# Patient Record
Sex: MALE | Race: WHITE | NOT HISPANIC OR LATINO | Employment: OTHER | URBAN - METROPOLITAN AREA
[De-identification: names, ages, dates, MRNs, and addresses within clinical notes are randomized per-mention and may not be internally consistent; named-entity substitution may affect disease eponyms.]

---

## 2020-11-25 ENCOUNTER — AMBULATORY - HEALTHEAST (OUTPATIENT)
Dept: SURGERY | Facility: CLINIC | Age: 85
End: 2020-11-25

## 2020-11-25 DIAGNOSIS — Z11.59 ENCOUNTER FOR SCREENING FOR OTHER VIRAL DISEASES: ICD-10-CM

## 2020-11-27 ENCOUNTER — RECORDS - HEALTHEAST (OUTPATIENT)
Dept: ADMINISTRATIVE | Facility: OTHER | Age: 85
End: 2020-11-27

## 2021-05-28 ENCOUNTER — RECORDS - HEALTHEAST (OUTPATIENT)
Dept: ADMINISTRATIVE | Facility: CLINIC | Age: 86
End: 2021-05-28

## 2022-07-08 ENCOUNTER — APPOINTMENT (OUTPATIENT)
Dept: CT IMAGING | Facility: CLINIC | Age: 87
End: 2022-07-08
Attending: EMERGENCY MEDICINE
Payer: COMMERCIAL

## 2022-07-08 ENCOUNTER — APPOINTMENT (OUTPATIENT)
Dept: RADIOLOGY | Facility: CLINIC | Age: 87
End: 2022-07-08
Attending: EMERGENCY MEDICINE
Payer: COMMERCIAL

## 2022-07-08 ENCOUNTER — MEDICAL CORRESPONDENCE (OUTPATIENT)
Dept: EMERGENCY MEDICINE | Facility: CLINIC | Age: 87
End: 2022-07-08

## 2022-07-08 ENCOUNTER — HOSPITAL ENCOUNTER (OUTPATIENT)
Facility: CLINIC | Age: 87
Setting detail: OBSERVATION
Discharge: NURSING, REHAB OR LONG-TERM CARE | End: 2022-07-11
Attending: EMERGENCY MEDICINE | Admitting: EMERGENCY MEDICINE
Payer: COMMERCIAL

## 2022-07-08 DIAGNOSIS — M62.81 GENERALIZED MUSCLE WEAKNESS: ICD-10-CM

## 2022-07-08 DIAGNOSIS — I48.91 ATRIAL FIBRILLATION WITH RVR (H): ICD-10-CM

## 2022-07-08 DIAGNOSIS — N39.0 URINARY TRACT INFECTION ASSOCIATED WITH INDWELLING URETHRAL CATHETER, INITIAL ENCOUNTER (H): Primary | ICD-10-CM
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
## 2022-07-08 DIAGNOSIS — T83.511A URINARY TRACT INFECTION ASSOCIATED WITH INDWELLING URETHRAL CATHETER, INITIAL ENCOUNTER (H): Primary | ICD-10-CM

## 2022-07-08 LAB
ABO/RH(D): NORMAL
ALBUMIN SERPL-MCNC: 3.4 G/DL (ref 3.5–5)
ALP SERPL-CCNC: 47 U/L (ref 45–120)
ALT SERPL W P-5'-P-CCNC: 28 U/L (ref 0–45)
ANION GAP SERPL CALCULATED.3IONS-SCNC: 12 MMOL/L (ref 5–18)
ANTIBODY SCREEN: NEGATIVE
AST SERPL W P-5'-P-CCNC: 20 U/L (ref 0–40)
BILIRUB DIRECT SERPL-MCNC: 0.4 MG/DL
BILIRUB SERPL-MCNC: 1.4 MG/DL (ref 0–1)
BUN SERPL-MCNC: 26 MG/DL (ref 8–28)
CALCIUM SERPL-MCNC: 8.8 MG/DL (ref 8.5–10.5)
CHLORIDE BLD-SCNC: 107 MMOL/L (ref 98–107)
CO2 SERPL-SCNC: 20 MMOL/L (ref 22–31)
CREAT SERPL-MCNC: 0.95 MG/DL (ref 0.7–1.3)
ERYTHROCYTE [DISTWIDTH] IN BLOOD BY AUTOMATED COUNT: 13.2 % (ref 10–15)
GFR SERPL CREATININE-BSD FRML MDRD: 77 ML/MIN/1.73M2
GLUCOSE BLD-MCNC: 143 MG/DL (ref 70–125)
HCT VFR BLD AUTO: 50.7 % (ref 40–53)
HGB BLD-MCNC: 17.1 G/DL (ref 13.3–17.7)
INR PPP: 3.66 (ref 0.85–1.15)
MAGNESIUM SERPL-MCNC: 2.1 MG/DL (ref 1.8–2.6)
MCH RBC QN AUTO: 31.9 PG (ref 26.5–33)
MCHC RBC AUTO-ENTMCNC: 33.7 G/DL (ref 31.5–36.5)
MCV RBC AUTO: 95 FL (ref 78–100)
PLATELET # BLD AUTO: 239 10E3/UL (ref 150–450)
POTASSIUM BLD-SCNC: 4.2 MMOL/L (ref 3.5–5)
PROT SERPL-MCNC: 6.4 G/DL (ref 6–8)
RBC # BLD AUTO: 5.36 10E6/UL (ref 4.4–5.9)
SARS-COV-2 RNA RESP QL NAA+PROBE: NEGATIVE
SODIUM SERPL-SCNC: 139 MMOL/L (ref 136–145)
SPECIMEN EXPIRATION DATE: NORMAL
TROPONIN I SERPL-MCNC: 0.08 NG/ML (ref 0–0.29)
WBC # BLD AUTO: 16.8 10E3/UL (ref 4–11)

## 2022-07-08 PROCEDURE — 36415 COLL VENOUS BLD VENIPUNCTURE: CPT | Performed by: EMERGENCY MEDICINE

## 2022-07-08 PROCEDURE — 93005 ELECTROCARDIOGRAM TRACING: CPT | Performed by: EMERGENCY MEDICINE

## 2022-07-08 PROCEDURE — 83735 ASSAY OF MAGNESIUM: CPT | Performed by: EMERGENCY MEDICINE

## 2022-07-08 PROCEDURE — 85610 PROTHROMBIN TIME: CPT | Performed by: EMERGENCY MEDICINE

## 2022-07-08 PROCEDURE — 71045 X-RAY EXAM CHEST 1 VIEW: CPT

## 2022-07-08 PROCEDURE — 72125 CT NECK SPINE W/O DYE: CPT

## 2022-07-08 PROCEDURE — 82248 BILIRUBIN DIRECT: CPT | Performed by: EMERGENCY MEDICINE

## 2022-07-08 PROCEDURE — C9803 HOPD COVID-19 SPEC COLLECT: HCPCS

## 2022-07-08 PROCEDURE — 74177 CT ABD & PELVIS W/CONTRAST: CPT

## 2022-07-08 PROCEDURE — 99285 EMERGENCY DEPT VISIT HI MDM: CPT | Mod: 25

## 2022-07-08 PROCEDURE — 85014 HEMATOCRIT: CPT | Performed by: EMERGENCY MEDICINE

## 2022-07-08 PROCEDURE — 86850 RBC ANTIBODY SCREEN: CPT | Performed by: EMERGENCY MEDICINE

## 2022-07-08 PROCEDURE — 86901 BLOOD TYPING SEROLOGIC RH(D): CPT | Performed by: EMERGENCY MEDICINE

## 2022-07-08 PROCEDURE — U0003 INFECTIOUS AGENT DETECTION BY NUCLEIC ACID (DNA OR RNA); SEVERE ACUTE RESPIRATORY SYNDROME CORONAVIRUS 2 (SARS-COV-2) (CORONAVIRUS DISEASE [COVID-19]), AMPLIFIED PROBE TECHNIQUE, MAKING USE OF HIGH THROUGHPUT TECHNOLOGIES AS DESCRIBED BY CMS-2020-01-R: HCPCS | Performed by: EMERGENCY MEDICINE

## 2022-07-08 PROCEDURE — 84484 ASSAY OF TROPONIN QUANT: CPT | Performed by: EMERGENCY MEDICINE

## 2022-07-08 PROCEDURE — 70450 CT HEAD/BRAIN W/O DYE: CPT

## 2022-07-08 PROCEDURE — 250N000011 HC RX IP 250 OP 636: Performed by: EMERGENCY MEDICINE

## 2022-07-08 PROCEDURE — 80053 COMPREHEN METABOLIC PANEL: CPT | Performed by: EMERGENCY MEDICINE

## 2022-07-08 RX ORDER — IOPAMIDOL 755 MG/ML
100 INJECTION, SOLUTION INTRAVASCULAR ONCE
Status: COMPLETED | OUTPATIENT
Start: 2022-07-08 | End: 2022-07-08

## 2022-07-08 RX ADMIN — IOPAMIDOL 100 ML: 755 INJECTION, SOLUTION INTRAVENOUS at 23:27

## 2022-07-08 ASSESSMENT — ENCOUNTER SYMPTOMS
LIGHT-HEADEDNESS: 0
TROUBLE SWALLOWING: 0
BACK PAIN: 0
DIARRHEA: 1
FREQUENCY: 0
DIZZINESS: 0
CONFUSION: 0
DIFFICULTY URINATING: 0
SHORTNESS OF BREATH: 0
ABDOMINAL PAIN: 1
DYSURIA: 0
FEVER: 0
NECK PAIN: 0
HEADACHES: 0
COUGH: 0
VOMITING: 1
BRUISES/BLEEDS EASILY: 1
HEMATURIA: 0

## 2022-07-09 ENCOUNTER — APPOINTMENT (OUTPATIENT)
Dept: PHYSICAL THERAPY | Facility: CLINIC | Age: 87
End: 2022-07-09
Attending: HOSPITALIST
Payer: COMMERCIAL

## 2022-07-09 ENCOUNTER — APPOINTMENT (OUTPATIENT)
Dept: OCCUPATIONAL THERAPY | Facility: CLINIC | Age: 87
End: 2022-07-09
Attending: HOSPITALIST
Payer: COMMERCIAL
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
## 2022-07-09 PROBLEM — M62.81 GENERALIZED MUSCLE WEAKNESS: Status: ACTIVE | Noted: 2022-07-09

## 2022-07-09 PROBLEM — I48.91 ATRIAL FIBRILLATION WITH RVR (H): Status: ACTIVE | Noted: 2022-07-09

## 2022-07-09 LAB
ALBUMIN UR-MCNC: 20 MG/DL
APPEARANCE UR: CLEAR
ATRIAL RATE - MUSE: 120 BPM
BILIRUB UR QL STRIP: NEGATIVE
COLOR UR AUTO: ABNORMAL
DIASTOLIC BLOOD PRESSURE - MUSE: NORMAL MMHG
GLUCOSE UR STRIP-MCNC: NEGATIVE MG/DL
HGB UR QL STRIP: NEGATIVE
INTERPRETATION ECG - MUSE: NORMAL
KETONES UR STRIP-MCNC: NEGATIVE MG/DL
LEUKOCYTE ESTERASE UR QL STRIP: ABNORMAL
MUCOUS THREADS #/AREA URNS LPF: PRESENT /LPF
NITRATE UR QL: NEGATIVE
P AXIS - MUSE: NORMAL DEGREES
PH UR STRIP: 5.5 [PH] (ref 5–7)
PR INTERVAL - MUSE: NORMAL MS
QRS DURATION - MUSE: 82 MS
QT - MUSE: 308 MS
QTC - MUSE: 459 MS
R AXIS - MUSE: 77 DEGREES
RBC URINE: 9 /HPF
SP GR UR STRIP: >1.05 (ref 1–1.03)
SQUAMOUS EPITHELIAL: 2 /HPF
SYSTOLIC BLOOD PRESSURE - MUSE: NORMAL MMHG
T AXIS - MUSE: -9 DEGREES
UROBILINOGEN UR STRIP-MCNC: <2 MG/DL
VENTRICULAR RATE- MUSE: 134 BPM
WBC URINE: 13 /HPF

## 2022-07-09 PROCEDURE — 250N000011 HC RX IP 250 OP 636: Performed by: INTERNAL MEDICINE

## 2022-07-09 PROCEDURE — 258N000003 HC RX IP 258 OP 636: Performed by: HOSPITALIST

## 2022-07-09 PROCEDURE — G0378 HOSPITAL OBSERVATION PER HR: HCPCS

## 2022-07-09 PROCEDURE — 97535 SELF CARE MNGMENT TRAINING: CPT | Mod: GO

## 2022-07-09 PROCEDURE — 97166 OT EVAL MOD COMPLEX 45 MIN: CPT | Mod: GO

## 2022-07-09 PROCEDURE — 81001 URINALYSIS AUTO W/SCOPE: CPT | Performed by: EMERGENCY MEDICINE

## 2022-07-09 PROCEDURE — 96365 THER/PROPH/DIAG IV INF INIT: CPT

## 2022-07-09 PROCEDURE — 250N000013 HC RX MED GY IP 250 OP 250 PS 637: Performed by: INTERNAL MEDICINE

## 2022-07-09 PROCEDURE — 97162 PT EVAL MOD COMPLEX 30 MIN: CPT | Mod: GP

## 2022-07-09 PROCEDURE — 99220 PR INITIAL OBSERVATION CARE,LEVEL III: CPT | Performed by: HOSPITALIST

## 2022-07-09 PROCEDURE — 87040 BLOOD CULTURE FOR BACTERIA: CPT | Performed by: INTERNAL MEDICINE

## 2022-07-09 PROCEDURE — 36415 COLL VENOUS BLD VENIPUNCTURE: CPT | Performed by: INTERNAL MEDICINE

## 2022-07-09 PROCEDURE — 87086 URINE CULTURE/COLONY COUNT: CPT | Performed by: EMERGENCY MEDICINE

## 2022-07-09 PROCEDURE — 97116 GAIT TRAINING THERAPY: CPT | Mod: GP

## 2022-07-09 PROCEDURE — 250N000013 HC RX MED GY IP 250 OP 250 PS 637: Performed by: HOSPITALIST

## 2022-07-09 RX ORDER — PROCHLORPERAZINE MALEATE 5 MG
5 TABLET ORAL EVERY 6 HOURS PRN
Status: DISCONTINUED | OUTPATIENT
Start: 2022-07-09 | End: 2022-07-11 | Stop reason: HOSPADM

## 2022-07-09 RX ORDER — WARFARIN SODIUM 5 MG/1
2.5-5 TABLET ORAL SEE ADMIN INSTRUCTIONS
Status: ON HOLD | COMMUNITY
Start: 2022-05-26 | End: 2022-07-11

## 2022-07-09 RX ORDER — CEFTRIAXONE 1 G/1
1 INJECTION, POWDER, FOR SOLUTION INTRAMUSCULAR; INTRAVENOUS EVERY 24 HOURS
Status: DISCONTINUED | OUTPATIENT
Start: 2022-07-09 | End: 2022-07-11 | Stop reason: HOSPADM

## 2022-07-09 RX ORDER — ACETAMINOPHEN 325 MG/1
975 TABLET ORAL EVERY 8 HOURS PRN
Status: DISCONTINUED | OUTPATIENT
Start: 2022-07-09 | End: 2022-07-11 | Stop reason: HOSPADM

## 2022-07-09 RX ORDER — METOPROLOL TARTRATE 50 MG
50 TABLET ORAL 2 TIMES DAILY
Status: ON HOLD | COMMUNITY
Start: 2022-04-22 | End: 2022-07-11

## 2022-07-09 RX ORDER — SODIUM CHLORIDE 9 MG/ML
INJECTION, SOLUTION INTRAVENOUS CONTINUOUS
Status: DISCONTINUED | OUTPATIENT
Start: 2022-07-09 | End: 2022-07-11

## 2022-07-09 RX ORDER — TRAVOPROST OPHTHALMIC SOLUTION 0.04 MG/ML
1 SOLUTION OPHTHALMIC AT BEDTIME
COMMUNITY
Start: 2022-06-28 | End: 2024-01-01

## 2022-07-09 RX ORDER — DILTIAZEM HYDROCHLORIDE 30 MG/1
30 TABLET, FILM COATED ORAL EVERY 4 HOURS PRN
Status: DISCONTINUED | OUTPATIENT
Start: 2022-07-09 | End: 2022-07-11 | Stop reason: HOSPADM

## 2022-07-09 RX ORDER — MELOXICAM 7.5 MG/1
7.5 TABLET ORAL DAILY
COMMUNITY
Start: 2022-06-09 | End: 2022-09-20

## 2022-07-09 RX ORDER — DILTIAZEM HYDROCHLORIDE 120 MG/1
120 CAPSULE, COATED, EXTENDED RELEASE ORAL DAILY
COMMUNITY
Start: 2022-05-24 | End: 2024-01-01

## 2022-07-09 RX ORDER — TAMSULOSIN HYDROCHLORIDE 0.4 MG/1
0.4 CAPSULE ORAL DAILY
Status: DISCONTINUED | OUTPATIENT
Start: 2022-07-09 | End: 2022-07-11 | Stop reason: HOSPADM

## 2022-07-09 RX ORDER — DOCUSATE SODIUM 100 MG/1
100 CAPSULE, LIQUID FILLED ORAL 2 TIMES DAILY
Status: DISCONTINUED | OUTPATIENT
Start: 2022-07-09 | End: 2022-07-11 | Stop reason: HOSPADM

## 2022-07-09 RX ORDER — ONDANSETRON 4 MG/1
4 TABLET, ORALLY DISINTEGRATING ORAL EVERY 6 HOURS PRN
Status: DISCONTINUED | OUTPATIENT
Start: 2022-07-09 | End: 2022-07-11 | Stop reason: HOSPADM

## 2022-07-09 RX ORDER — ONDANSETRON 2 MG/ML
4 INJECTION INTRAMUSCULAR; INTRAVENOUS EVERY 6 HOURS PRN
Status: DISCONTINUED | OUTPATIENT
Start: 2022-07-09 | End: 2022-07-11 | Stop reason: HOSPADM

## 2022-07-09 RX ORDER — PROCHLORPERAZINE 25 MG
12.5 SUPPOSITORY, RECTAL RECTAL EVERY 12 HOURS PRN
Status: DISCONTINUED | OUTPATIENT
Start: 2022-07-09 | End: 2022-07-11 | Stop reason: HOSPADM

## 2022-07-09 RX ADMIN — DOCUSATE SODIUM 100 MG: 100 CAPSULE, LIQUID FILLED ORAL at 08:15

## 2022-07-09 RX ADMIN — CEFTRIAXONE 1 G: 1 INJECTION, POWDER, FOR SOLUTION INTRAMUSCULAR; INTRAVENOUS at 12:33

## 2022-07-09 RX ADMIN — DOCUSATE SODIUM 100 MG: 100 CAPSULE, LIQUID FILLED ORAL at 18:05

## 2022-07-09 RX ADMIN — SODIUM CHLORIDE: 9 INJECTION, SOLUTION INTRAVENOUS at 02:42

## 2022-07-09 RX ADMIN — ACETAMINOPHEN 975 MG: 325 TABLET ORAL at 20:34

## 2022-07-09 RX ADMIN — SODIUM CHLORIDE: 9 INJECTION, SOLUTION INTRAVENOUS at 16:06

## 2022-07-09 RX ADMIN — TAMSULOSIN HYDROCHLORIDE 0.4 MG: 0.4 CAPSULE ORAL at 18:05

## 2022-07-09 ASSESSMENT — ACTIVITIES OF DAILY LIVING (ADL)
PREVIOUS_RESPONSIBILITIES: MEAL PREP;HOUSEKEEPING;LAUNDRY;SHOPPING;MEDICATION MANAGEMENT;FINANCES;DRIVING
DEPENDENT_IADLS:: CLEANING

## 2022-07-09 NOTE — PROGRESS NOTES
07/09/22 1000   Quick Adds   Type of Visit Initial PT Evaluation   Living Environment   People in Home alone   Current Living Arrangements house   Home Accessibility stairs to enter home;stairs within home   Number of Stairs, Main Entrance 1   Number of Stairs, Within Home, Primary seven   Stair Railings, Within Home, Primary railings safe and in good condition   Living Environment Comments Split level home   Self-Care   Equipment Currently Used at Home none   Fall history within last six months yes   Number of times patient has fallen within last six months 2   General Information   Onset of Illness/Injury or Date of Surgery 07/08/22   Referring Physician Dr. Luis Singletary   Patient/Family Therapy Goals Statement (PT) Go home   Pertinent History of Current Problem (include personal factors and/or comorbidities that impact the POC) A. Fib, falls   Existing Precautions/Restrictions fall   Weight-Bearing Status - LLE full weight-bearing   Weight-Bearing Status - RLE full weight-bearing   Bed Mobility   Bed Mobility supine-sit;sit-supine   Supine-Sit [place] (Bed Mobility) contact guard;verbal cues   Transfers   Transfers sit-stand transfer   Maintains Weight-bearing Status (Transfers) able to maintain   Transfer Safety Concerns Noted losing balance backward   Impairments Contributing to Impaired Transfers decreased strength   Sit-Stand Transfer   Sit-Stand [place] (Transfers) minimum assist (75% patient effort);verbal cues   Assistive Device (Sit-Stand Transfers)   (None)   Gait/Stairs (Locomotion)   [place] Level (Gait) contact guard;verbal cues   Assistive Device (Gait)   (None)   Distance in Feet (Required for LE Total Joints) 5   Pattern (Gait) step-to   Deviations/Abnormal Patterns (Gait) gait speed decreased;festinating/shuffling  (reaching for furniture)   Maintains Weight-bearing Status (Gait) able to maintain   Clinical Impression   Criteria for Skilled Therapeutic Intervention Yes, treatment  indicated   PT Diagnosis (PT) Impaired functional mobility   Influenced by the following impairments weakness, impaired balance   Functional limitations due to impairments transfers, gait, stairs   Clinical Presentation (PT Evaluation Complexity) Evolving/Changing   Clinical Presentation Rationale Pt presents as medically diagnosed   Clinical Decision Making (Complexity) moderate complexity   Planned Therapy Interventions (PT) home exercise program;balance training;bed mobility training;gait training;stair training;strengthening;transfer training   Anticipated Equipment Needs at Discharge (PT) walker, rolling   Risk & Benefits of therapy have been explained evaluation/treatment results reviewed;care plan/treatment goals reviewed;patient   PT Discharge Planning   PT Discharge Recommendation (DC Rec) (S)  Transitional Care Facility   PT Rationale for DC Rec Only ambulate 10ft prior to fatigue, stairs at home, lives alone, high fall risk   Total Evaluation Time   Total Evaluation Time (Minutes) 10   Physical Therapy Goals   PT Frequency Daily   PT Predicted Duration/Target Date for Goal Attainment 07/16/22   PT Goals Transfers;Gait;Stairs   PT: Transfers Modified independent;Sit to/from stand;Assistive device   PT: Gait Modified independent;Rolling walker;100 feet   PT: Stairs Supervision/stand-by assist;8 stairs;Rail on left

## 2022-07-09 NOTE — H&P
[facility] MEDICINE ADMISSION HISTORY AND PHYSICAL     Assessment & Plan      Liz Shankar is a 88 year old old male who was brought to the emergency department due to generalized weakness, recent fall, poor oral intake.    Evaluation in the emergency department was notable for atrial fibrillation with rapid ventricular response, leukocytosis without clear evidence of infection, imaging of the head and neck negative for trauma.    Assessment and Plan:  Generalized weakness  Unclear etiology, seems likely related to poor oral intake  Urinalysis pending  Check bladder scan and straight cath if high-volume  Gentle IV fluids  PT and OT evaluations tomorrow    Poor oral intake  Complains of being unable to eat much despite being hungry  Did have some GI upset 1 to 2 weeks ago with nausea vomiting and diarrhea, symptoms now resolved  Monitor oral intake here, monitor for postprandial pain, nausea and vomiting    Atrial fibrillation with rapid ventricular response  Coronary artery disease  Essential hypertension   Seems he is on both metoprolol and diltiazem  Chronically anticoagulated with warfarin  Will use oral diltiazem as needed for RVR tonight  Med rec pending for the morning  IV fluids as dehydration may contribute to tachycardia    DVT proph: Already anticoagulated  Code Status: Full code, discussed at time of admission  Disposition: Observation   Diet: Regular  Pain tx: Tylenol as needed  PT/OT: Both  Fluids: Normal saline at 75/h      Chief Complaint  weakness, poor oral intake     HISTORY     Liz Shankar is a 88 year old old male who has not been seen for a while from one of his neighbors who is a nurse and he/C became concerned.  Found patient to be sitting on his couch and too weak to stand as well as with some facial bruising.  Says that he has been having some falls but no loss of consciousness.  Says that he just does not feel well.  Says that he feels hungry but is not  really able to eat much.  Denies fever, chills, chest pain, shortness of breath.  Denies any dysuria.  In the emergency department he is unable to give urine sample, does not really feel the urge to void.      Past Medical History   Past Medical History:  No date: Atrial fibrillation (H)  No date: Bilateral chronic knee pain  No date: CAD (coronary artery disease)   Surgical History   History reviewed. No pertinent surgical history.  Family History    Reviewed, and History reviewed. No pertinent family history.   Social History      Social History     Tobacco Use     Smoking status: Former Smoker     Packs/day: 1.00     Years: 10.00     Pack years: 10.00     Smokeless tobacco: Never Used   Substance Use Topics     Alcohol use: Never      Allergies   No Known Allergies  Prior to Admission Medications      None      Review of Systems   A 12 point comprehensive review of systems was negative except as noted above in HPI.  PHYSICAL EXAMINATION     Vitals    Temp:  [97.2  F (36.2  C)] 97.2  F (36.2  C)  Pulse:  [] 114  Resp:  [16-17] 17  BP: ()/(71-84) 139/84  SpO2:  [95 %] 95 %  Examination   Physical Exam:    Gen: no acute distress, uncomfortable, frail elderly male who is alert and interactive  ENT: no scleral icterus, he does have bruising around the left eye  Pulm: lungs are clear without wheezing, crackles  CV: No significant lower extremity edema, heart rate seems regular with slight tachycardia  GI: abdomen is soft, non-tender, non-distended with active bowel sounds.  MSK: no significant peripheral pitting edema, no noticeable swelling/erythema or warmth of joints.  Derm: Bruising around the left eye  Psych: appropriate affect, frustrated with how uncomfortable the bed is    Luis Singletary,   [facility]  [facility]   Phone: #[phone]

## 2022-07-09 NOTE — PROGRESS NOTES
[facility] MEDICINE PROGRESS NOTE        Liz Shankar is a 88 year old old male who was brought to the emergency department due to generalized weakness, recent fall, poor oral intake.     Evaluation in the emergency department was notable for atrial fibrillation with rapid ventricular response, leukocytosis with UTI, imaging of the head and neck negative for trauma.     Assessment and Plan:  Generalized weakness likely secondary to UTI   Urinalysis with UTI. Will get BCx at this time. Follow up on Urine Cx and start Ceftriaxone 1 gm every day.  PT recommended transitional care facility.   Check bladder scan and straight cath if high-volume  Gentle IV fluids  PT and OT evaluations      Poor oral intake  Complains of being unable to eat much despite being hungry  Did have some GI upset 1 to 2 weeks ago with nausea vomiting and diarrhea, symptoms now resolved  Monitor oral intake here, monitor for postprandial pain, nausea and vomiting     Atrial fibrillation with rapid ventricular response  Coronary artery disease  Essential hypertension   Seems he is on both metoprolol and diltiazem  Chronically anticoagulated with warfarin  Will use oral diltiazem as needed   Med rec pending for the morning  IV fluids as dehydration may contribute to tachycardia     DVT proph: Already anticoagulated INR is 3.66 so warfarin is held for now  Code Status: Full code, discussed at time of admission  Disposition: Needs transitional care facility per PT  Diet: Regular  Pain tx: Tylenol as needed  PT/OT: Both  Fluids: Normal saline at 75/h       Sin Turner MD  [facility]  [facility]  [facility]  Phone: #[phone]    Interval History/Subjective:    Frustrated about his stay here.  States that he is very uncomfortable.  Vitals wise afebrile and within normal limits.  Labs wise patient has urinalysis concerning for urinary tract infection, chest x-ray overall insignificant,  CT imaging of the abdomen, cervical spine and CT head that is all insignificant.  His white blood cell count is elevated at 16.8 yesterday and CMP is overall insignificant.  Right now patient remains on ceftriaxone 1 g 24 hours, docusate senna    Physical Exam/Objective:  Temp:  [97.2  F (36.2  C)-97.6  F (36.4  C)] 97.6  F (36.4  C)  Pulse:  [] 97  Resp:  [11-29] 13  BP: ()/(71-90) 107/76  SpO2:  [93 %-96 %] 96 %  Body mass index is 25.1 kg/m .    General: Aox3, NIAD  Cardiovascular: Regular rhythm, normal rate, normal S1, normal S2, no jugular venous distention and no lower extremity edema  Resp: Clear to auscultation bilaterally, no wheezes, no rales or rhonchi  Abd: Soft, tender suprapubic area, non-distended, no organomegaly  Neuro: CN 2-12 intact, no focal deficits   Psych: Normal mood     Data reviewed today: I personally reviewed all new medications, labs, imaging/diagnostics reports over the past 24 hours. Pertinent findings include:    Imaging:   Recent Results (from the past 24 hour(s))   CT Head w/o Contrast    Narrative    EXAM: CT HEAD W/O CONTRAST, CT CERVICAL SPINE W/O CONTRAST  LOCATION: [facility]  DATE/TIME: 7/8/2022 11:21 PM    INDICATION: Head and face injury.    COMPARISON: None.    TECHNIQUE:   1) Routine CT head without IV contrast. Multiplanar reformats. Dose reduction techniques were used.  2) Routine CT cervical spine without IV contrast. Multiplanar reformats. Dose reduction techniques were used.    FINDINGS:   HEAD CT:   INTRACRANIAL CONTENTS: No intracranial hemorrhage, extra-axial collection, or mass effect. No CT evidence of acute infarct. Mild to moderate volume loss and mild burden presumed chronic small vessel ischemia.    VISUALIZED ORBITS/SINUSES/MASTOIDS: No intraorbital abnormality. No paranasal sinus mucosal disease. No middle ear or mastoid effusion.    BONES/SOFT TISSUES: No acute abnormality.    CERVICAL SPINE CT:   VERTEBRA: Smooth  reversal of the normal cervical lordosis. 2.5 mm degenerative anterolisthesis of C3 on C4. Alignment is otherwise normal. No acute cervical spine fracture or posttraumatic subluxation. Marked degenerative disc disease C4-C5 through   C7-T1. Mild loss of disc space height at C3-C4. Marked right C2-C3 and C3-C4 facet arthropathy. Lower grade changes elsewhere.    CANAL/FORAMINA: No canal narrowing. Moderate to marked left C5-C6 and C6-C7 degenerative foraminal narrowing.    PARASPINAL: No extraspinal abnormality. Visualized lung fields are clear.      Impression    IMPRESSION:  HEAD CT:  1.  No acute intracranial abnormality.  2.  Age-related change.    CERVICAL SPINE CT:  1.  No CT evidence for acute fracture or posttraumatic subluxation.     Cervical spine CT w/o contrast    Narrative    EXAM: CT HEAD W/O CONTRAST, CT CERVICAL SPINE W/O CONTRAST  LOCATION: [facility]  DATE/TIME: 7/8/2022 11:21 PM    INDICATION: Head and face injury.    COMPARISON: None.    TECHNIQUE:   1) Routine CT head without IV contrast. Multiplanar reformats. Dose reduction techniques were used.  2) Routine CT cervical spine without IV contrast. Multiplanar reformats. Dose reduction techniques were used.    FINDINGS:   HEAD CT:   INTRACRANIAL CONTENTS: No intracranial hemorrhage, extra-axial collection, or mass effect. No CT evidence of acute infarct. Mild to moderate volume loss and mild burden presumed chronic small vessel ischemia.    VISUALIZED ORBITS/SINUSES/MASTOIDS: No intraorbital abnormality. No paranasal sinus mucosal disease. No middle ear or mastoid effusion.    BONES/SOFT TISSUES: No acute abnormality.    CERVICAL SPINE CT:   VERTEBRA: Smooth reversal of the normal cervical lordosis. 2.5 mm degenerative anterolisthesis of C3 on C4. Alignment is otherwise normal. No acute cervical spine fracture or posttraumatic subluxation. Marked degenerative disc disease C4-C5 through   C7-T1. Mild loss of disc space  height at C3-C4. Marked right C2-C3 and C3-C4 facet arthropathy. Lower grade changes elsewhere.    CANAL/FORAMINA: No canal narrowing. Moderate to marked left C5-C6 and C6-C7 degenerative foraminal narrowing.    PARASPINAL: No extraspinal abnormality. Visualized lung fields are clear.      Impression    IMPRESSION:  HEAD CT:  1.  No acute intracranial abnormality.  2.  Age-related change.    CERVICAL SPINE CT:  1.  No CT evidence for acute fracture or posttraumatic subluxation.     CT Abdomen Pelvis w Contrast    Narrative    EXAM: CT ABDOMEN PELVIS W CONTRAST  LOCATION: [facility]  DATE/TIME: 7/8/2022 11:22 PM    INDICATION: hypotension, on blood thinners, fall recent vomiting  COMPARISON: None.  TECHNIQUE: CT scan of the abdomen and pelvis was performed following injection of IV contrast. Multiplanar reformats were obtained. Dose reduction techniques were used.  CONTRAST: Isovue 370 100mL    FINDINGS:   LOWER CHEST: Mild basilar interstitial fibrosis. Calcified lingular granuloma. Extensive coronary calcification.    HEPATOBILIARY: Mild hepatic steatosis. 5 mm low-attenuation lesion in the right hepatic lobe is too small to further characterize although statistically benign. Focal fat adjacent to the falciform. Gallbladder is distended without calcified stones or   inflammatory stranding.    PANCREAS: Unremarkable    SPLEEN: Unremarkable    ADRENAL GLANDS: Unremarkable    KIDNEYS/BLADDER: No hydronephrosis.    BOWEL: No bowel obstruction. Diverticulosis without diverticulitis. No free fluid.    LYMPH NODES: No significant retroperitoneal adenopathy    VASCULATURE: Mild fusiform dilatation of the infrarenal abdominal aorta reaching 2.7 cm. Bilateral common iliac artery aneurysms measuring 1.6 cm on the right and 1.7 cm on the left.    PELVIC ORGANS: No free fluid    MUSCULOSKELETAL: Multilevel spondylosis and facet arthropathy. No acute bony abnormalities. Mild thoracolumbar scoliosis.       Impression    IMPRESSION:   1.  No evidence of acute trauma in the abdomen or pelvis.   XR Chest Port 1 View    Narrative    EXAM: XR CHEST PORT 1 VIEW  LOCATION: [facility]  DATE/TIME: 7/8/2022 11:30 PM    INDICATION: Generalized weakness.    COMPARISON: Chest x-ray 2 views 3/5/2005.      Impression    IMPRESSION: Small calcified granuloma right upper lobe identified today. Otherwise both lungs remain clear. No adenopathy or effusion. Normal cardiac size and pulmonary vascularity. Mildly atherosclerotic thoracic aorta. Degenerative changes both   shoulders and the spine. Previously healed multiple right rib fracture deformities, unchanged. Monitoring leads have been placed.       Labs:  Most Recent 3 CBC's:Recent Labs   Lab Test 07/08/22 2238   WBC 16.8*   HGB 17.1   MCV 95        Most Recent 3 BMP's:Recent Labs   Lab Test 07/08/22 2238      POTASSIUM 4.2   CHLORIDE 107   CO2 20*   BUN 26   CR 0.95   ANIONGAP 12   MARBIN 8.8   *     Most Recent 2 LFT's:Recent Labs   Lab Test 07/08/22 2238   AST 20   ALT 28   ALKPHOS 47   BILITOTAL 1.4*     Most Recent 3 INR's:Recent Labs   Lab Test 07/08/22 2238   INR 3.66*       Medications:   Personally Reviewed.  Medications     sodium chloride 75 mL/hr at 07/09/22 0716       cefTRIAXone  1 g Intravenous Q24H     docusate sodium  100 mg Oral BID

## 2022-07-09 NOTE — CONSULTS
Care Management Initial Consult    General Information  Assessment completed with: Patient,    Type of CM/SW Visit: Initial Assessment    Primary Care Provider verified and updated as needed: Yes   Readmission within the last 30 days: no previous admission in last 30 days      Reason for Consult: discharge planning  Advance Care Planning: Advance Care Planning Reviewed: other (see comments) (Declined Honoring Choices information)     General Information Comments: Lives alone    Communication Assessment  Patient's communication style: spoken language (English or Bilingual)    Hearing Difficulty or Deaf: no   Wear Glasses or Blind: yes    Cognitive  Cognitive/Neuro/Behavioral: WDL                      Living Environment:   People in home: alone     Current living Arrangements: house      Able to return to prior arrangements: other (see comments) (Pending clinical progress)  Living Arrangement Comments: Lives alone    Family/Social Support:  Care provided by: self  Provides care for: no one  Marital Status:   Neighbor          Description of Support System: Supportive, Involved (Whit Gamino)    Support Assessment: Lacks adequate physical care    Current Resources:   Patient receiving home care services: No     Community Resources: None  Equipment currently used at home: none  Supplies currently used at home: None    Employment/Financial:  Employment Status: retired        Financial Concerns: No concerns identified           Lifestyle & Psychosocial Needs:  Social Determinants of Health     Tobacco Use: Medium Risk     Smoking Tobacco Use: Former Smoker     Smokeless Tobacco Use: Never Used   Alcohol Use: Not on file   Financial Resource Strain: Not on file   Food Insecurity: Not on file   Transportation Needs: Not on file   Physical Activity: Not on file   Stress: Not on file   Social Connections: Not on file   Intimate Partner Violence: Not on file   Depression: Not on file   Housing Stability: Not on  file       Functional Status:  Prior to admission patient needed assistance:   Dependent ADLs:: Independent (States he uses no assistive device and is independent)  Dependent IADLs:: Cleaning  Assesssment of Functional Status: Not at  functional baseline, Needs placement in a SNF/TCF for rehabilitation    Mental Health Status:          Chemical Dependency Status:                Values/Beliefs:  Spiritual, Cultural Beliefs, Taoism Practices, Values that affect care:                 Additional Information:  Alert, oriented patient lives alone in a private residence. Presents to [facility] ED via EMS after his neighbor, who is a nurse, found him sitting on his couch and unable to get up on his own. Patient states he doesn t use any assistive ambulatory device; no home care services; does drive, locally.    Patient states he prepares his own meals but  hasn t eaten much lately due to lack of appetite. States he takes own medications and does chores around his home as he is able. This writer explained OSORIO / Observation status to patient who voiced understanding. Also reviewed differences between home care services and TCU services. Presently patient is not sure  if he wants either one . Patient says he will think about it. Writer assured patient that unit care manager will follow-up and discuss needs pending clinical progress and PT/OT evaluation. Friends/neighbors Pedro/Porsche Gamino will transport on discharge.      BRIDGET ANDERSON RN/CM

## 2022-07-09 NOTE — ED TRIAGE NOTES
Neighbor hadn't seen pt doing his normal activities, EMSfound pton couch reports just feeling generalized weakness and fatigue, pt reports decreased PO intake.  Pt has bruising around left eye, reports falling last week into a table, denies blood thinners.     Triage Assessment     Row Name 07/08/22 2039       Triage Assessment (Adult)    Airway WDL WDL       Respiratory WDL    Respiratory WDL WDL       Skin Circulation/Temperature WDL    Skin Circulation/Temperature WDL WDL       Cardiac WDL    Cardiac WDL WDL       Peripheral/Neurovascular WDL    Peripheral Neurovascular WDL WDL       Cognitive/Neuro/Behavioral WDL    Cognitive/Neuro/Behavioral WDL WDL

## 2022-07-09 NOTE — PHARMACY-ADMISSION MEDICATION HISTORY
Pharmacy Note - Admission Medication History    Pertinent Provider Information: Patient was taking warfarin 5mg daily and had a normal INR on June 1st, 2022.  Patient's INR has been fluctuating ever since and has been holding doses as well as taking 2.5-5 mg daily per [facility] INR clinic instructions.  Patient's INR is still elevated today.    Patient noted he stopped feeling hungry in the mornings, so he cut out breakfast and also stopped taking his medications because of this.  Patient last took medications at home on Monday (7/4) ______________________________________________________________________    Prior To Admission (PTA) med list completed and updated in EMR.       PTA Med List   Medication Sig Note Last Dose     bismuth subsalicylate (PEPTO BISMOL) 262 MG/15ML suspension Take 15 mLs by mouth every 6 hours as needed  Past Week at Unknown time     diltiazem ER COATED BEADS (CARDIZEM CD/CARTIA XT) 120 MG 24 hr capsule Take 120 mg by mouth daily  7/4/2022     meloxicam (MOBIC) 7.5 MG tablet Take 7.5 mg by mouth daily  7/4/2022     metoprolol tartrate (LOPRESSOR) 50 MG tablet Take 50 mg by mouth 2 times daily  7/4/2022     travoprost BAK FREE (TRAVATAN Z) 0.004 % ophthalmic solution Apply 1 drop to eye At Bedtime  7/4/2022     warfarin ANTICOAGULANT (COUMADIN) 5 MG tablet Take 2.5-5 mg by mouth See Admin Instructions 7/9/2022: Pt was previously taking 5 mg daily on 6/1/22. For the past month he has been alternating holding warfarin and taking 2.5 -5 mg daily due to elevated INRs. See progress note for more info.  7/4/2022       Information source(s): Patient and CareEverywhere/SureScripts  Method of interview communication: in-person    Summary of Changes to PTA Med List  New: all medications    Patient was asked about OTC/herbal products specifically.  PTA med list reflects this.    In the past week, patient estimated taking medication this percent of the time:  less than 50% due to illness.    Allergies were  reviewed, assessed, and updated with the patient.      Patient did not bring any medications to the hospital and can't retrieve from home. No multi-dose medications are available for use during hospital stay.     The information provided in this note is only as accurate as the sources available at the time of the update(s).    Thank you for the opportunity to participate in the care of this patient.    Celia Gayle  7/9/2022 10:56 AM

## 2022-07-09 NOTE — ED PROVIDER NOTES
EMERGENCY DEPARTMENT ENCOUNTER      NAME: Liz Shankar  AGE: 88 year old male  YOB: 1934  MRN: [id number]  EVALUATION DATE & TIME: 7/8/2022  9:52 PM    PCP: No primary care provider on file.    ED PROVIDER: Phani Mo MD      Chief Complaint   Patient presents with     Generalized Weakness         FINAL IMPRESSION:  1. Atrial fibrillation with RVR (H)    2. Generalized muscle weakness    3. Fall, initial encounter          ED COURSE & MEDICAL DECISION MAKING:    Pertinent Labs & Imaging studies reviewed. (See chart for details)  10:10 PM I met with the patient to gather history and to perform my initial exam. We discussed plans for the ED course, including diagnostic testing and treatment.   1:43 AM I rechecked and updated the patient with results. Patient states he has not taken his prescription medications in the past 24 hours.   1:55 AM I discussed the patient with Dr. Singletary from the hospitalist service who agrees to admit the patient.         88 year old male presents to the Emergency Department for evaluation of lysed weakness and fall on warfarin    ED Course as of 07/09/22 0321   Fri Jul 08, 2022   2208 Patient brought in via EMS for generalized weakness also reportedly fell couple days ago told triage nurse not on blood thinners but per chart review is on warfarin for A. fib.   2221 Primary survey airway patent, bilateral breath sounds, iregular heart rate with 1+ radial pulses, GCS 15   2221 Secondary survey notable for murmur, head trauma with yellow bruising consistent with greater than 24 hours of injury   2222 Patient is eating a sandwich and drinking a bunch of water   2222 Plan for CT head and neck, EKG, labs   2222 Patient reports he had some vomiting and diarrhea intermittently last couple weeks denies any blood or black stools however based on anticoagulation concern for GI bleed therefore CT abdomen and pelvis ordered   2222 Based on history and exam sepsis unlikely   2223  Pulmonary emboli unlikely   2223 Dehydration secondary to vomiting and diarrhea possible   2223 Overmedication secondary to being on diltiazem also possible   2223 I spoke with patient's nurse regarding making patient trauma alert since he is on blood thinners and struck his head   Sat Jul 09, 2022 0319 SARS CoV2 PCR: Negative   0319 INR(!): 3.66   0319 Hemoglobin: 17.1   0319 Glucose(!): 143     Trauma imaging negative.  Elevated white blood cell count but chst x-ray and CT abdomen without source of infection.  UA pending.     Is currently in A. fib with RVR which is likely contributing to generalized weakness and falls.    Recommend admission since he has been falling and is on warfarin and is in A fib with RVR.     Normal neuro exam neck stroke unlikely.  No chest pain or shortness of breath and a supratherapeutic INR therefore PE unlikely.  Sepsis unlikely.    Discussed with hospitalist who agreed to admit patient for observation      At the conclusion of the encounter I discussed the results of all of the tests and the disposition. The questions were answered. The patient or family acknowledged understanding and was agreeable with the care plan.       PPE worn: n95 mask, face shield.    MEDICATIONS GIVEN IN THE EMERGENCY:  Medications   melatonin tablet 1 mg (has no administration in time range)   ondansetron (ZOFRAN ODT) ODT tab 4 mg (has no administration in time range)     Or   ondansetron (ZOFRAN) injection 4 mg (has no administration in time range)   sodium chloride 0.9% infusion ( Intravenous New Bag 7/9/22 0242)   acetaminophen (TYLENOL) tablet 975 mg (has no administration in time range)   docusate sodium (COLACE) capsule 100 mg (has no administration in time range)   prochlorperazine (COMPAZINE) injection 5 mg (has no administration in time range)     Or   prochlorperazine (COMPAZINE) tablet 5 mg (has no administration in time range)     Or   prochlorperazine (COMPAZINE) suppository 12.5 mg (has no  "administration in time range)   diltiazem (CARDIZEM) tablet 30 mg (has no administration in time range)   iopamidol (ISOVUE-370) solution 100 mL (100 mLs Intravenous Given 7/8/22 8963)       NEW PRESCRIPTIONS STARTED AT TODAY'S ER VISIT  New Prescriptions    No medications on file          =================================================================    HPI    Patient information was obtained from: patient     Use of : N/A       Liz Shankar is a 88 year old male with a pertinent history of AFIB (on Warfarin) who presents to this ED via EMS for evaluation of multiple complaints.     Per chart review, patient's INR was 4.2 on 6/30/22.    Patient presents by EMS for failure to thrive and a mechanical fall. Patient reports he had a mechanical trip and fall in his entry way 2 days ago (7/6/22). He fell forward and hit his face. He is on Warfarin. Now with overlying bruising to his left eye. Also has an area of dried blood on his right parietal scalp. Patient denies any associating headache, facial pain, eye pain, vision changes, confusion, dental pain. No acute neck or back pain or hip pain. He is still able to ambulate since the fall.     Patient states he has felt generally weak recently and has been laying on the couch most of the last several days. He reports decreased appetites, noting that he \"just doesn't feel like eating\". Patient states his neighbor checked on him tonight and called EMS as he continued to be fatigued and not eating. Does report chronic bilateral knee pain that also affects his ability to get around the house. Patient also reports epigastric abdominal pain. Denies pain with eating. No trouble swallowing. Patient reports 2 days of vomiting and diarrhea 2 weeks ago which have resolved. Patient denies hematemesis, black or bloody stools, urinary symptoms, chest pain, shortness of breath, lightheadedness, dizziness, fever, cough.     Of note, patient reports his appetite is " "returning while in the ED tonight. He was eating a sandwich on initial interview.       REVIEW OF SYSTEMS   Review of Systems   Constitutional: Negative for fever.   HENT: Negative for dental problem and trouble swallowing.         Positive for head injury.   Eyes: Negative for visual disturbance.        Positive for bruising to left orbit.   Respiratory: Negative for cough and shortness of breath.    Cardiovascular: Negative for chest pain.   Gastrointestinal: Positive for abdominal pain, diarrhea (resolved) and vomiting (resolved).        Negative for pain with eating, hematemesis, dark or tarry stools.    Genitourinary: Negative for difficulty urinating, dysuria, frequency and hematuria.   Musculoskeletal: Negative for back pain, gait problem and neck pain.        Positive for chronic bilateral knee pain.  Negative for hip pain.   Neurological: Negative for dizziness, light-headedness and headaches.   Hematological: Bruises/bleeds easily (on Warfarin).   Psychiatric/Behavioral: Negative for confusion.        PAST MEDICAL HISTORY:  Past Medical History:   Diagnosis Date     Atrial fibrillation (H)      Bilateral chronic knee pain      CAD (coronary artery disease)        PAST SURGICAL HISTORY:  History reviewed. No pertinent surgical history.        CURRENT MEDICATIONS:    No current outpatient medications on file.      ALLERGIES:  No Known Allergies    FAMILY HISTORY:  History reviewed. No pertinent family history.    SOCIAL HISTORY:   Social History     Socioeconomic History     Marital status:     Lives at home     VITALS:  /71   Pulse 95   Temp 97.6  F (36.4  C) (Oral)   Resp 19   Ht 1.803 m (5' 11\")   Wt 81.6 kg (180 lb)   SpO2 93%   BMI 25.10 kg/m      PHYSICAL EXAM    /71   Pulse 95   Temp 97.6  F (36.4  C) (Oral)   Resp 19   Ht 1.803 m (5' 11\")   Wt 81.6 kg (180 lb)   SpO2 93%   BMI 25.10 kg/m      General Appearance: Alert, cooperative, normal speech and facial symmetry,  " appears stated age, drinking fluids vigorously.    Primary survey:     Airway patent  Breath sounds: bilateral breath sounds  Cardiovascular: 2+ radial pulses and DP pulses  Disability GCS 15    Secondary survey    Head:  Old bruising  To left orbit, dried blood over right parietal scalp.   Eyes:  PERRL,pupils midsized, conjunctiva/corneas clear, EOM's intact, no orbital injury  ENT:  No obvious facial deformity.    Neck:  No midline cervical spine tenderness.  No paraspinal tenderness.  Supple, symmetrical, trachea midline, no stridor or dysphonia, no soft tissue swelling or tenderness  Chest:  No tenderness or deformity, no crepitus  Cardio: irregular rhythm, 1+ pulses  Pulm:  Clear to auscultation bilaterally, respirations unlabored,   Back:  Symmetric, no curvature, ROM normal, no CVA tenderness, no spinal tenderness  Abdomen:  Soft, non-tender  Extremities:  No obvious deformity  Skin:  Skin color, texture, turgor normal, no rashes or lesions, cooler hands  Neuro:  Awake, alert, responsive to voice, follows commands, normal speech, No gross motor weakness or sensory loss, moves all extremities,  GCS  15    LAB:  All pertinent labs reviewed and interpreted.  Results for orders placed or performed during the hospital encounter of 07/08/22   CT Head w/o Contrast    Impression    IMPRESSION:  HEAD CT:  1.  No acute intracranial abnormality.  2.  Age-related change.    CERVICAL SPINE CT:  1.  No CT evidence for acute fracture or posttraumatic subluxation.     Cervical spine CT w/o contrast    Impression    IMPRESSION:  HEAD CT:  1.  No acute intracranial abnormality.  2.  Age-related change.    CERVICAL SPINE CT:  1.  No CT evidence for acute fracture or posttraumatic subluxation.     CT Abdomen Pelvis w Contrast    Impression    IMPRESSION:   1.  No evidence of acute trauma in the abdomen or pelvis.   XR Chest Port 1 View    Impression    IMPRESSION: Small calcified granuloma right upper lobe identified today.  Otherwise both lungs remain clear. No adenopathy or effusion. Normal cardiac size and pulmonary vascularity. Mildly atherosclerotic thoracic aorta. Degenerative changes both   shoulders and the spine. Previously healed multiple right rib fracture deformities, unchanged. Monitoring leads have been placed.   CBC with platelets   Result Value Ref Range    WBC Count 16.8 (H) 4.0 - 11.0 10e3/uL    RBC Count 5.36 4.40 - 5.90 10e6/uL    Hemoglobin 17.1 13.3 - 17.7 g/dL    Hematocrit 50.7 40.0 - 53.0 %    MCV 95 78 - 100 fL    MCH 31.9 26.5 - 33.0 pg    MCHC 33.7 31.5 - 36.5 g/dL    RDW 13.2 10.0 - 15.0 %    Platelet Count 239 150 - 450 10e3/uL   Basic metabolic panel   Result Value Ref Range    Sodium 139 136 - 145 mmol/L    Potassium 4.2 3.5 - 5.0 mmol/L    Chloride 107 98 - 107 mmol/L    Carbon Dioxide (CO2) 20 (L) 22 - 31 mmol/L    Anion Gap 12 5 - 18 mmol/L    Urea Nitrogen 26 8 - 28 mg/dL    Creatinine 0.95 0.70 - 1.30 mg/dL    Calcium 8.8 8.5 - 10.5 mg/dL    Glucose 143 (H) 70 - 125 mg/dL    GFR Estimate 77 >60 mL/min/1.73m2   Result Value Ref Range    INR 3.66 (H) 0.85 - 1.15   Troponin I (now)   Result Value Ref Range    Troponin I 0.08 0.00 - 0.29 ng/mL   Hepatic function panel   Result Value Ref Range    Bilirubin Total 1.4 (H) 0.0 - 1.0 mg/dL    Bilirubin Direct 0.4 <=0.5 mg/dL    Protein Total 6.4 6.0 - 8.0 g/dL    Albumin 3.4 (L) 3.5 - 5.0 g/dL    Alkaline Phosphatase 47 45 - 120 U/L    AST 20 0 - 40 U/L    ALT 28 0 - 45 U/L   Result Value Ref Range    Magnesium 2.1 1.8 - 2.6 mg/dL   Asymptomatic COVID-19 Virus (Coronavirus) by PCR Nasopharyngeal    Specimen: Nasopharyngeal; Swab   Result Value Ref Range    SARS CoV2 PCR Negative Negative   Adult Type and Screen   Result Value Ref Range    ABO/RH(D) AB POS     Antibody Screen Negative Negative    SPECIMEN EXPIRATION DATE [id number]        RADIOLOGY:  Reviewed all pertinent imaging. Please see official radiology report.  XR Chest Port 1 View   Final Result    IMPRESSION: Small calcified granuloma right upper lobe identified today. Otherwise both lungs remain clear. No adenopathy or effusion. Normal cardiac size and pulmonary vascularity. Mildly atherosclerotic thoracic aorta. Degenerative changes both    shoulders and the spine. Previously healed multiple right rib fracture deformities, unchanged. Monitoring leads have been placed.      CT Abdomen Pelvis w Contrast   Final Result   IMPRESSION:    1.  No evidence of acute trauma in the abdomen or pelvis.      Cervical spine CT w/o contrast   Final Result   IMPRESSION:   HEAD CT:   1.  No acute intracranial abnormality.   2.  Age-related change.      CERVICAL SPINE CT:   1.  No CT evidence for acute fracture or posttraumatic subluxation.         CT Head w/o Contrast   Final Result   IMPRESSION:   HEAD CT:   1.  No acute intracranial abnormality.   2.  Age-related change.      CERVICAL SPINE CT:   1.  No CT evidence for acute fracture or posttraumatic subluxation.             EKG:    Performed at: 23:00 on 7/8/2022     Impression: Atrial fibrillation with rapid ventricular response with premature ventricular or aberrantly conducted complexes, abnormal QRS-T angle (consider primary T wave abnormality)    Rate: 134 bpm  Rhythm: AFIB with RVR  Axis: 77  QRS Interval: 82 ms  QTc Interval: 459 ms  ST Changes: abnormal QRS-T angle  Comparison: Compared to EKG of 1/3/2006, AFIB has replaced sinus rhythm, vent rate has increased by 74 bpm, and T wave inversion more evident in inferior leads    I have independently reviewed and interpreted the EKG(s) documented above.    PROCEDURES:   None      I, Ray Coles, am serving as a scribe to document services personally performed by Phani Mo MD based on my observation and the provider's statements to me. I, Phani Mo, attest that Ray Coles is acting in a scribe capacity, has observed my performance of the services and has documented them in accordance with my  direction.    Phani Mo MD  Emergency Medicine  [facility] EMERGENCY ROOM  Former[facility] [facility] [id number] [phone]     Phani Mo MD  07/09/22 0321

## 2022-07-09 NOTE — ED NOTES
Pt voided 10cc for UA sample. Writer bladder scanned patient for 409mL.  Pt up to bedside to attempt to void again.

## 2022-07-09 NOTE — PROGRESS NOTES
Occupational Therapy       07/09/22 1445   Quick Adds   Type of Visit Initial Occupational Therapy Evaluation   Living Environment   People in Home alone  (Pt has a neighbor nearby who he is able to call for help; she is an RN)   Current Living Arrangements house   Home Accessibility stairs within home   Living Environment Comments STS with grab bars; Walk-in shower with chair   Self-Care   Usual Activity Tolerance good   Current Activity Tolerance fair   Equipment Currently Used at Home dressing device  (shoe horn)   Activity/Exercise/Self-Care Comment Pt reports being independent with ADLs at baseline   Instrumental Activities of Daily Living (IADL)   Previous Responsibilities meal prep;housekeeping;laundry;shopping;medication management;finances;driving   General Information   Onset of Illness/Injury or Date of Surgery 07/08/22   Referring Physician Luis Singletary   Patient/Family Therapy Goal Statement (OT) get home   Existing Precautions/Restrictions no known precautions/restrictions   Cognitive Status Examination   Orientation Status orientation to person, place and time   Affect/Mental Status (Cognitive) WFL   Range of Motion Comprehensive   General Range of Motion bilateral upper extremity ROM WFL   Strength Comprehensive (MMT)   General Manual Muscle Testing (MMT) Assessment no strength deficits identified  (BUE)   Bed Mobility   Bed Mobility supine-sit   Supine-Sit [place] (Bed Mobility) minimum assist (75% patient effort)   Transfers   Transfers toilet transfer   Toilet Transfer   Type (Toilet Transfer) sit-stand   [place] Level (Toilet Transfer) contact guard   Assistive Device (Toilet Transfer) grab bars/safety frame;commode chair   Clinical Impression   Criteria for Skilled Therapeutic Interventions Met (OT) Yes, treatment indicated   OT Diagnosis Decreased ADL independence   OT Problem List-Impairments impacting ADL activity tolerance impaired;strength;mobility   Assessment of Occupational  Performance 3-5 Performance Deficits   Identified Performance Deficits dressing, toileting, transfers, bathing   Planned Therapy Interventions (OT) ADL retraining;transfer training;progressive activity/exercise   Clinical Decision Making Complexity (OT) moderate complexity   Anticipated Equipment Needs Upon Discharge (OT) raised toilet seat   Risk & Benefits of therapy have been explained care plan/treatment goals reviewed;patient   OT Discharge Planning   OT Discharge Recommendation (DC Rec) (S)  Transitional Care Facility   OT Rationale for DC Rec Pt is very limited by poor activity tolerance. Pt lives alone and is a falls risk. Rec TCU stay for increased safety and independence with ADLs and functional mob.   Total Evaluation Time (Minutes)   Total Evaluation Time (Minutes) 5   OT Goals   Therapy Frequency (OT) Daily   OT Predicted Duration/Target Date for Goal Attainment 07/15/22   OT Goals Lower Body Dressing;Toilet Transfer/Toileting   OT: Lower Body Dressing Modified independent   OT: Toilet Transfer/Toileting Modified independent     Myrtle James OTR/ISREAL 7/9/2022

## 2022-07-10 ENCOUNTER — APPOINTMENT (OUTPATIENT)
Dept: PHYSICAL THERAPY | Facility: CLINIC | Age: 87
End: 2022-07-10
Payer: COMMERCIAL

## 2022-07-10 ENCOUNTER — APPOINTMENT (OUTPATIENT)
Dept: OCCUPATIONAL THERAPY | Facility: CLINIC | Age: 87
End: 2022-07-10
Payer: COMMERCIAL

## 2022-07-10 LAB
ALBUMIN SERPL-MCNC: 2.5 G/DL (ref 3.5–5)
ALP SERPL-CCNC: 39 U/L (ref 45–120)
ALT SERPL W P-5'-P-CCNC: 22 U/L (ref 0–45)
ANION GAP SERPL CALCULATED.3IONS-SCNC: 5 MMOL/L (ref 5–18)
AST SERPL W P-5'-P-CCNC: 20 U/L (ref 0–40)
BACTERIA UR CULT: NORMAL
BILIRUB SERPL-MCNC: 1.2 MG/DL (ref 0–1)
BUN SERPL-MCNC: 16 MG/DL (ref 8–28)
CALCIUM SERPL-MCNC: 7.6 MG/DL (ref 8.5–10.5)
CHLORIDE BLD-SCNC: 107 MMOL/L (ref 98–107)
CO2 SERPL-SCNC: 24 MMOL/L (ref 22–31)
CREAT SERPL-MCNC: 0.7 MG/DL (ref 0.7–1.3)
ERYTHROCYTE [DISTWIDTH] IN BLOOD BY AUTOMATED COUNT: 13.1 % (ref 10–15)
GFR SERPL CREATININE-BSD FRML MDRD: 89 ML/MIN/1.73M2
GLUCOSE BLD-MCNC: 93 MG/DL (ref 70–125)
HCT VFR BLD AUTO: 43.5 % (ref 40–53)
HGB BLD-MCNC: 14.4 G/DL (ref 13.3–17.7)
INR PPP: 3.88 (ref 0.85–1.15)
MCH RBC QN AUTO: 32.1 PG (ref 26.5–33)
MCHC RBC AUTO-ENTMCNC: 33.1 G/DL (ref 31.5–36.5)
MCV RBC AUTO: 97 FL (ref 78–100)
PLATELET # BLD AUTO: 210 10E3/UL (ref 150–450)
POTASSIUM BLD-SCNC: 3.9 MMOL/L (ref 3.5–5)
PROT SERPL-MCNC: 4.7 G/DL (ref 6–8)
RBC # BLD AUTO: 4.48 10E6/UL (ref 4.4–5.9)
SODIUM SERPL-SCNC: 136 MMOL/L (ref 136–145)
WBC # BLD AUTO: 9 10E3/UL (ref 4–11)

## 2022-07-10 PROCEDURE — G0378 HOSPITAL OBSERVATION PER HR: HCPCS

## 2022-07-10 PROCEDURE — 250N000011 HC RX IP 250 OP 636: Performed by: INTERNAL MEDICINE

## 2022-07-10 PROCEDURE — 250N000013 HC RX MED GY IP 250 OP 250 PS 637: Performed by: INTERNAL MEDICINE

## 2022-07-10 PROCEDURE — 97116 GAIT TRAINING THERAPY: CPT | Mod: GP

## 2022-07-10 PROCEDURE — 97129 THER IVNTJ 1ST 15 MIN: CPT | Mod: GO

## 2022-07-10 PROCEDURE — 85610 PROTHROMBIN TIME: CPT | Performed by: HOSPITALIST

## 2022-07-10 PROCEDURE — 85027 COMPLETE CBC AUTOMATED: CPT | Performed by: HOSPITALIST

## 2022-07-10 PROCEDURE — 36415 COLL VENOUS BLD VENIPUNCTURE: CPT | Performed by: HOSPITALIST

## 2022-07-10 PROCEDURE — 96376 TX/PRO/DX INJ SAME DRUG ADON: CPT

## 2022-07-10 PROCEDURE — 97535 SELF CARE MNGMENT TRAINING: CPT | Mod: GO

## 2022-07-10 PROCEDURE — 258N000003 HC RX IP 258 OP 636: Performed by: HOSPITALIST

## 2022-07-10 PROCEDURE — 80053 COMPREHEN METABOLIC PANEL: CPT | Performed by: HOSPITALIST

## 2022-07-10 PROCEDURE — 250N000013 HC RX MED GY IP 250 OP 250 PS 637: Performed by: HOSPITALIST

## 2022-07-10 RX ADMIN — SODIUM CHLORIDE: 9 INJECTION, SOLUTION INTRAVENOUS at 05:24

## 2022-07-10 RX ADMIN — DOCUSATE SODIUM 100 MG: 100 CAPSULE, LIQUID FILLED ORAL at 20:16

## 2022-07-10 RX ADMIN — TAMSULOSIN HYDROCHLORIDE 0.4 MG: 0.4 CAPSULE ORAL at 11:05

## 2022-07-10 RX ADMIN — CEFTRIAXONE 1 G: 1 INJECTION, POWDER, FOR SOLUTION INTRAMUSCULAR; INTRAVENOUS at 11:05

## 2022-07-10 RX ADMIN — SODIUM CHLORIDE: 9 INJECTION, SOLUTION INTRAVENOUS at 20:15

## 2022-07-10 RX ADMIN — ACETAMINOPHEN 975 MG: 325 TABLET ORAL at 20:22

## 2022-07-10 RX ADMIN — DOCUSATE SODIUM 100 MG: 100 CAPSULE, LIQUID FILLED ORAL at 11:05

## 2022-07-10 NOTE — PLAN OF CARE
Problem: Plan of Care - These are the overarching goals to be used throughout the patient stay.    Goal: Optimal Comfort and Wellbeing  Outcome: Ongoing, Progressing   Goal Outcome Evaluation:        Pt. Will rest tonight with minimal interruptions of sleep and rest.

## 2022-07-10 NOTE — PROGRESS NOTES
[facility]    The SLUMS ([facility] Mental Status Exam) is a 30-point standardized cognitive screen used to identify the presence of cognitive deficits and/or to identify a change in cognition over time.  This screen assesses cognitive abilities in various domains.  (aging@[facility] [place].[facility])    Patient's performance was as follows:    Total Score: 15/30     Scoring If High School Educated If Less than High School Educated   Normal 27-30 25-30   Mild Neurocognitive Disorder 21-26 20-24   Dementia 1-20 1-19       Score Interpretation: Score places patient in the Dementia category.  Patient demonstrates deficits in the following areas: attention, language, visiospatial/executive function, and short term memory recall.  Please note that this examination is used to screen individuals to look for the presence of cognitive deficits and to identify changes in cognition over time.  This is not a diagnosis.  This examination can be followed by further cognitive assessments if appropriate and deemed necessary. REC discharge to TCU (24/7 supervision if home d/t pt lives alone), as well as assist with complex IADLs and driving assessment prior to resuming driving.    Myrtle James OTR/L 7/10/2022        Total Time with Patient: 15 minutes     Graft Donor Site Bandage (Optional-Leave Blank If You Don't Want In Note): adaptic sterile cotton hypafix  pressure bandage were applied to the donor site.

## 2022-07-10 NOTE — PLAN OF CARE
Patient A&Ox4, but forgetful.  Patient rested well through-out the night.  Patient on tele, running a-fib in the 80s. VSS.

## 2022-07-10 NOTE — PROGRESS NOTES
[facility]      OUTPATIENT OCCUPATIONAL THERAPY  EVALUATION  PLAN OF TREATMENT FOR OUTPATIENT REHABILITATION  (COMPLETE FOR INITIAL CLAIMS ONLY)  Patient's Last Name, First Name, M.I.  YOB: 1934  RaadLiz  D                          Provider's Name  [facility] Medical Record No.  [id number]                               Onset Date:  07/08/22   Start of Care Date:  07/09/22     Type:     ___PT   _X_OT   ___SLP Medical Diagnosis:  (P) generalized weakness                        OT Diagnosis:  Decreased ADL independence   Visits from SOC:  1   _________________________________________________________________________________  Plan of Treatment/Functional Goals    Planned Interventions: ADL retraining, transfer training, progressive activity/exercise   Goals: See Occupational Therapy Goals on Care Plan in [facility] electronic [facility] record.    Therapy Frequency: Daily  Predicted Duration of Therapy Intervention: 07/15/22  _________________________________________________________________________________    I CERTIFY THE NEED FOR THESE SERVICES FURNISHED UNDER        THIS PLAN OF TREATMENT AND WHILE UNDER MY CARE     (Physician co-signature of this document indicates review and certification of the therapy plan).              Certification date from: 07/09/22, Certification date to: 08/09/22    Referring Physician: Luis Singletary            Initial Assessment        See Occupational Therapy evaluation dated 07/09/22 in Epic electronic health record.      FREDDIE Stevens/ISREAL 7/10/2022

## 2022-07-10 NOTE — PROGRESS NOTES
[facility] MEDICINE PROGRESS NOTE      Liz Shankar is a 88 year old old male who was brought to the emergency department due to generalized weakness, recent fall, poor oral intake.     Evaluation in the emergency department was notable for atrial fibrillation with rapid ventricular response, leukocytosis with UTI, imaging of the head and neck negative for trauma.    He was evaluated by PT OT and they recommended transitional care facility. Social work assisting with matter.      Assessment and Plan:  Generalized weakness likely secondary to UTI   Urinalysis with UTI. Will get BCx at this time. Follow up on Urine Cx and start Ceftriaxone 1 gm every day. Rx with Abx for 5 days from 7/9/2022 to 7/13/2022. Transition to oral Abx on 7/11/2022  PT recommended transitional care facility.   Check bladder scan and straight cath if high-volume  Gentle IV fluids  PT and OT evaluations      Poor oral intake  Complains of being unable to eat much despite being hungry  Did have some GI upset 1 to 2 weeks ago with nausea vomiting and diarrhea, symptoms now resolved  Monitor oral intake here, monitor for postprandial pain, nausea and vomiting     Atrial fibrillation with rapid ventricular response  Coronary artery disease  Essential hypertension   Seems he is on both metoprolol and diltiazem  Chronically anticoagulated with warfarin  Will use oral diltiazem as needed   Med rec pending for the morning  IV fluids as dehydration may contribute to tachycardia     DVT proph: Already anticoagulated INR is 3.88 so warfarin is held for now  Code Status: Full code, discussed at time of admission  Disposition: Needs transitional care facility per PT  Diet: Regular  Pain tx: Tylenol as needed  PT/OT: Both  Fluids: Normal saline at 75/h    Sin Turner MD  Hospitalist  [facility] Medicine  [facility]  Phone: #[phone]    Interval History/Subjective:  NAEON    Physical  Exam/Objective:  Temp:  [97.2  F (36.2  C)-97.7  F (36.5  C)] 97.7  F (36.5  C)  Pulse:  [] 92  Resp:  [14-35] 18  BP: (107-140)/(72-92) 107/72  SpO2:  [96 %-98 %] 98 %  Body mass index is 25.1 kg/m .    General: Aox3, NIAD  Cardiovascular: Regular rhythm, normal rate, normal S1, normal S2, no jugular venous distention and no lower extremity edema  Resp: Clear to auscultation bilaterally, no wheezes, no rales or rhonchi  Abd: Soft, tender suprapubic area, non-distended, no organomegaly  Neuro: CN 2-12 intact, no focal deficits   Psych: Normal mood     Data reviewed today: I personally reviewed all new medications, labs, imaging/diagnostics reports over the past 24 hours. Pertinent findings include:    Imaging:   No results found for this or any previous visit (from the past 24 hour(s)).    Labs:  Most Recent 3 CBC's:Recent Labs   Lab Test 07/10/22  0641 07/08/22  2238   WBC 9.0 16.8*   HGB 14.4 17.1   MCV 97 95    239     Most Recent 3 BMP's:Recent Labs   Lab Test 07/10/22  0641 07/08/22  2238    139   POTASSIUM 3.9 4.2   CHLORIDE 107 107   CO2 24 20*   BUN 16 26   CR 0.70 0.95   ANIONGAP 5 12   MARBIN 7.6* 8.8   GLC 93 143*     Most Recent 2 LFT's:Recent Labs   Lab Test 07/10/22  0641 07/08/22  2238   AST 20 20   ALT 22 28   ALKPHOS 39* 47   BILITOTAL 1.2* 1.4*     Most Recent 3 INR's:Recent Labs   Lab Test 07/10/22  0641 07/08/22  2238   INR 3.88* 3.66*       Medications:   Personally Reviewed.  Medications     sodium chloride 75 mL/hr at 07/10/22 1105       cefTRIAXone  1 g Intravenous Q24H     docusate sodium  100 mg Oral BID     tamsulosin  0.4 mg Oral Daily

## 2022-07-10 NOTE — PROGRESS NOTES
PRIMARY DIAGNOSIS: GENERALIZED WEAKNESS    OUTPATIENT/OBSERVATION GOALS TO BE MET BEFORE DISCHARGE  1. Orthostatic performed: N/A    2. Tolerating PO medications: Yes    3. Return to near baseline physical activity: Yes    4. Cleared for discharge by consultants (if involved): No    Discharge Planner Nurse   Safe discharge environment identified: No  Barriers to discharge: Yes       Entered by: Sarah Alberto RN 07/10/2022 6:10 PM     Please review provider order for any additional goals.   Nurse to notify provider when observation goals have been met and patient is ready for discharge.    Pt denies pain, VSS, A fib on tele. NS infusing at 75 mL/hr

## 2022-07-10 NOTE — PROGRESS NOTES
[facility]      OUTPATIENT PHYSICAL THERAPY EVALUATION  PLAN OF TREATMENT FOR OUTPATIENT REHABILITATION  (COMPLETE FOR INITIAL CLAIMS ONLY)  Patient's Last Name, First Name, M.I.  YOB: 1934  Liz Shankar                        Provider's Name  [facility] Medical Record No.  [id number]                               Onset Date:  07/08/22   Start of Care Date:         Type:     _X_PT   ___OT   ___SLP Medical Diagnosis:                           PT Diagnosis:  Impaired functional mobility   Visits from SOC:  1   _________________________________________________________________________________  Plan of Treatment/Functional Goals    Planned Interventions: home exercise program, balance training, bed mobility training, gait training, stair training, strengthening, transfer training     Goals: See Physical Therapy Goals on Care Plan in [facility] electronic health record.    Therapy Frequency: Daily  Predicted Duration of Therapy Intervention: 07/16/22  _________________________________________________________________________________    I CERTIFY THE NEED FOR THESE SERVICES FURNISHED UNDER        THIS PLAN OF TREATMENT AND WHILE UNDER MY CARE     (Physician co-signature of this document indicates review and certification of the therapy plan).              Certification date from: (P) 07/09/22,      Referring Physician: Dr. Luis Singletary            Initial Assessment        See Physical Therapy evaluation dated   in Epic electronic health record.

## 2022-07-10 NOTE — PLAN OF CARE
Vss. A&O with bouts of confusion. Denies pain except for when ambulating. PIV running NS @ 75 mL/hr. Working with PT/OT to determine discharge plan. PT recommending TCU. Pt transferring to 3N. Nurse to nurse hand off completed.

## 2022-07-10 NOTE — UTILIZATION REVIEW
Concurrent stay review; Secondary Review Determination       Under the authority of the Utilization Management Committee, the utilization review process indicated a secondary review on the above patient.  The review outcome is based on review of the medical records, discussions with staff, and applying clinical experience noted on the date of the review.          (x) Observation Status Appropriate - Concurrent stay review    RATIONALE FOR DETERMINATION     Mr. Shankar is a 87 yo male pt with a PMH of dementia who presents to the ED with general weakness, recent fall and decreased po intake.  Abnormal UA was concerning for UTI; but UC neg for growth.  Remains on IVF but is tolerating a reg diet. Has a fib but vent rate has been controlled on po meds today.  Remaining in the hospital for discharge planning and TCU bed.       Patient is clinically improving and there is no clear indication to change patient's status to inpatient. The severity of illness, intensity of service provided, expected LOS and risk for adverse outcome make the care appropriate for observation.      The information on this document is developed by the utilization review team in order for the business office to ensure compliance.  This only denotes the appropriateness of proper admission status and does not reflect the quality of care rendered.         The definitions of Inpatient Status and Observation Status used in making the determination above are those provided in the CMS Coverage Manual, Chapter 1 and Chapter 6, section 70.4.          Sincerely,       Jessica Cabrera, DO  Utilization Review  Physician Advisor  [facility].

## 2022-07-11 ENCOUNTER — LAB REQUISITION (OUTPATIENT)
Dept: LAB | Facility: CLINIC | Age: 87
End: 2022-07-11
Payer: COMMERCIAL

## 2022-07-11 ENCOUNTER — APPOINTMENT (OUTPATIENT)
Dept: OCCUPATIONAL THERAPY | Facility: CLINIC | Age: 87
End: 2022-07-11
Payer: COMMERCIAL

## 2022-07-11 VITALS
RESPIRATION RATE: 18 BRPM | BODY MASS INDEX: 24.95 KG/M2 | WEIGHT: 178.2 LBS | OXYGEN SATURATION: 96 % | DIASTOLIC BLOOD PRESSURE: 68 MMHG | HEART RATE: 107 BPM | SYSTOLIC BLOOD PRESSURE: 119 MMHG | TEMPERATURE: 98 F | HEIGHT: 71 IN

## 2022-07-11 PROCEDURE — 250N000013 HC RX MED GY IP 250 OP 250 PS 637: Performed by: INTERNAL MEDICINE

## 2022-07-11 PROCEDURE — 250N000011 HC RX IP 250 OP 636: Performed by: INTERNAL MEDICINE

## 2022-07-11 PROCEDURE — 250N000013 HC RX MED GY IP 250 OP 250 PS 637: Performed by: HOSPITALIST

## 2022-07-11 PROCEDURE — 96376 TX/PRO/DX INJ SAME DRUG ADON: CPT

## 2022-07-11 PROCEDURE — 97535 SELF CARE MNGMENT TRAINING: CPT | Mod: GO

## 2022-07-11 PROCEDURE — G0378 HOSPITAL OBSERVATION PER HR: HCPCS

## 2022-07-11 PROCEDURE — U0003 INFECTIOUS AGENT DETECTION BY NUCLEIC ACID (DNA OR RNA); SEVERE ACUTE RESPIRATORY SYNDROME CORONAVIRUS 2 (SARS-COV-2) (CORONAVIRUS DISEASE [COVID-19]), AMPLIFIED PROBE TECHNIQUE, MAKING USE OF HIGH THROUGHPUT TECHNOLOGIES AS DESCRIBED BY CMS-2020-01-R: HCPCS

## 2022-07-11 RX ORDER — WARFARIN SODIUM 1 MG/1
TABLET ORAL
DISCHARGE
Start: 2022-07-12 | End: 2022-09-20

## 2022-07-11 RX ORDER — DOCUSATE SODIUM 100 MG/1
100 CAPSULE, LIQUID FILLED ORAL 2 TIMES DAILY
DISCHARGE
Start: 2022-07-11 | End: 2024-01-01

## 2022-07-11 RX ORDER — TAMSULOSIN HYDROCHLORIDE 0.4 MG/1
0.4 CAPSULE ORAL DAILY
DISCHARGE
Start: 2022-07-12 | End: 2022-12-20

## 2022-07-11 RX ORDER — METOPROLOL TARTRATE 25 MG/1
25 TABLET, FILM COATED ORAL 2 TIMES DAILY
Status: DISCONTINUED | OUTPATIENT
Start: 2022-07-11 | End: 2022-07-11 | Stop reason: HOSPADM

## 2022-07-11 RX ORDER — CEPHALEXIN 500 MG/1
500 CAPSULE ORAL 2 TIMES DAILY
Qty: 8 CAPSULE | Refills: 0 | Status: SHIPPED | OUTPATIENT
Start: 2022-07-12 | End: 2022-07-16

## 2022-07-11 RX ORDER — METOPROLOL TARTRATE 50 MG
50 TABLET ORAL 2 TIMES DAILY
DISCHARGE
Start: 2022-07-11 | End: 2022-12-20

## 2022-07-11 RX ORDER — TRAVOPROST OPHTHALMIC SOLUTION 0.04 MG/ML
1 SOLUTION OPHTHALMIC AT BEDTIME
Status: DISCONTINUED | OUTPATIENT
Start: 2022-07-11 | End: 2022-07-11 | Stop reason: HOSPADM

## 2022-07-11 RX ADMIN — DOCUSATE SODIUM 100 MG: 100 CAPSULE, LIQUID FILLED ORAL at 10:06

## 2022-07-11 RX ADMIN — METOPROLOL TARTRATE 25 MG: 25 TABLET, FILM COATED ORAL at 11:10

## 2022-07-11 RX ADMIN — TAMSULOSIN HYDROCHLORIDE 0.4 MG: 0.4 CAPSULE ORAL at 10:06

## 2022-07-11 RX ADMIN — CEFTRIAXONE 1 G: 1 INJECTION, POWDER, FOR SOLUTION INTRAMUSCULAR; INTRAVENOUS at 11:06

## 2022-07-11 NOTE — PROGRESS NOTES
PRIMARY DIAGNOSIS: GENERALIZED WEAKNESS    OUTPATIENT/OBSERVATION GOALS TO BE MET BEFORE DISCHARGE  1. Orthostatic performed: N/A    2. Tolerating PO medications: Yes    3. Return to near baseline physical activity: Yes    4. Cleared for discharge by consultants (if involved): No    Discharge Planner Nurse   Safe discharge environment identified: No  Barriers to discharge: Yes       Entered by: Jd Medina RN 07/11/2022 12:35 AM     Please review provider order for any additional goals.   Nurse to notify provider when observation goals have been met and patient is ready for discharge.    Pt states discomfort in the abdomen in the RLQ. VSS, A-fib on tele. NS infusing at 75 mL/hr. SBA with ambulation.

## 2022-07-11 NOTE — PLAN OF CARE
PRIMARY DIAGNOSIS: GENERALIZED WEAKNESS    OUTPATIENT/OBSERVATION GOALS TO BE MET BEFORE DISCHARGE  1. Orthostatic performed: N/A    2. Tolerating PO medications: Yes    3. Return to near baseline physical activity: Yes    4. Cleared for discharge by consultants (if involved): Yes    Discharge Planner Nurse   Safe discharge environment identified: Yes  Barriers to discharge: No       Entered by: Priscila Valle RN 07/11/2022 1:36 PM     Please review provider order for any additional goals.   Nurse to notify provider when observation goals have been met and patient is ready for discharge.

## 2022-07-11 NOTE — PROGRESS NOTES
Care Management Discharge Note    Discharge Date: 07/11/2022       Discharge Disposition: Transitional Care    Discharge Services:  ([facility] TCU)    Discharge DME:  (nothing new)    Discharge Transportation: family or friend will provide (neighbor Porsche vs brother to transport)    Private pay costs discussed: None indicated.     PAS Confirmation Code:  [id number]  Patient/family educated on Medicare website which has current facility and service quality ratings: yes    Education Provided on the Discharge Plan: Patient, brother, neighbor all in agreement with discharge to TCU. AVS will be per bedside RN.    Persons Notified of Discharge Plans: patient, brother, sister in law, neighbor  Patient/Family in Agreement with the Plan: yes    Handoff Referral Completed:  LISSETT coordinated with patient, neighbor and family. CM coordinated with MD, bedside RN, Charge RN and [facility]. CM coordinated with TCU admissions. CM faxed discharge orders.     Additional Information:  Chart reviewed. PT/OT recommendations for TCU. [facility] 15/30 on 7/10.  LISSETT met with patient. He is agreeable TCU placement. He states he does not have a HCD. He states his neighbor Porsche is who he would prefer be contacted for discharge planning and decision making as she is a Nurse and has lived next door to him for over 20 years. He does have a brother Bairon that can be hard to reach by phone. LISSETT unable to reach brother but did leave  requesting return call to CM. CM spoke to noemi Morrell who confirms information reported by patient. She is agreeable with placement at [facility] and can transport him at 3:30pm. She has been in contact with patient brother and believes he is currently driving down from [facility] to see visit with patient.    [facility]. LISSETT provided update that patient accepts bed and neighbor will transport at 3:30pm.   [facility] in admissions is reviewing. No bed availability for today.     1:40 PM  CM met with brother Bairon and sister in law Ca at bedside. They can transport patient over to [facility]. CM answered all questions asked by family. CM awaiting return call from [facility] to confirm if patient is OK to transfer prior to 3:30pm as arranged.     CM completed PAS.     Koki Faulkner RN

## 2022-07-11 NOTE — PROGRESS NOTES
PRIMARY DIAGNOSIS: GENERALIZED WEAKNESS    OUTPATIENT/OBSERVATION GOALS TO BE MET BEFORE DISCHARGE  1. Orthostatic performed: N/A    2. Tolerating PO medications: Yes    3. Return to near baseline physical activity: Yes    4. Cleared for discharge by consultants (if involved): No    Discharge Planner Nurse   Safe discharge environment identified: No  Barriers to discharge: Yes       Entered by: Jd Medina RN 07/11/2022 4:49 AM    VSS, POOJA-alphonso on tele. NS infusing at 75 mL/hr. SBA with ambulation. Pt sleeping comfortably in bed.

## 2022-07-11 NOTE — PROGRESS NOTES
Physical Therapy Discharge Summary    Reason for therapy discharge:    Discharged to transitional care facility.    Progress towards therapy goal(s). [facility] for goal details.  Goals met    Therapy recommendation(s):    Continued therapy is recommended.  Rationale/Recommendations:  Continue at facility.

## 2022-07-11 NOTE — PLAN OF CARE
Pt A&O x4, VSS. No c/o pain, SOB or H/N/V. Tele reads Afib with HR in 90s. Continent of bladder and bowel, up with SBA and walker. Pt is ready to discharge to TCU today, neighbor/friend will be transporting pt to facility.     Goal Outcome Evaluation:  Problem: Dysrhythmia  Goal: Normalized Cardiac Rhythm  Outcome: Met     Problem: Fatigue  Goal: Improved Activity Tolerance  Intervention: Promote Improved Energy     Problem: Fatigue  Goal: Improved Activity Tolerance  Outcome: Met  Intervention: Promote Improved Energy

## 2022-07-11 NOTE — PROGRESS NOTES
Occupational Therapy Discharge Summary    Reason for therapy discharge:    Discharged to transitional care facility.    Progress towards therapy goal(s). See goals on Care Plan in [facility] electronic health record for goal details.  Goals partially met.  Barriers to achieving goals:   discharge from facility.    Therapy recommendation(s):    Continued therapy is recommended.  Rationale/Recommendations:  To increase indep with ADLs and trsfs.  Continue to monitor cognition.

## 2022-07-11 NOTE — PLAN OF CARE
PRIMARY DIAGNOSIS: GENERALIZED WEAKNESS    OUTPATIENT/OBSERVATION GOALS TO BE MET BEFORE DISCHARGE  1. Orthostatic performed: N/A    2. Tolerating PO medications: Yes    3. Return to near baseline physical activity: Yes    4. Cleared for discharge by consultants (if involved): No    Please review provider order for any additional goals.   Nurse to notify provider when observation goals have been met and patient is ready for discharge.

## 2022-07-11 NOTE — DISCHARGE SUMMARY
NSG DISCHARGE NOTE    Patient discharged to transitional care unit at 3:38 PM via wheel chair. Accompanied by other: Brother. Discharge instructions reviewed with n/a going to TCU, opportunity offered to ask questions. Prescriptions - discharge to TCU. All belongings sent with patient.    Priscila Valle RN

## 2022-07-12 ENCOUNTER — LAB REQUISITION (OUTPATIENT)
Dept: LAB | Facility: CLINIC | Age: 87
End: 2022-07-12
Payer: COMMERCIAL

## 2022-07-12 ENCOUNTER — PATIENT OUTREACH (OUTPATIENT)
Dept: CARE COORDINATION | Facility: CLINIC | Age: 87
End: 2022-07-12

## 2022-07-12 DIAGNOSIS — Z71.89 OTHER SPECIFIED COUNSELING: ICD-10-CM

## 2022-07-12 DIAGNOSIS — I48.91 UNSPECIFIED ATRIAL FIBRILLATION (H): ICD-10-CM

## 2022-07-12 LAB
INR PPP: 1.42 (ref 0.85–1.15)
SARS-COV-2 RNA RESP QL NAA+PROBE: NEGATIVE

## 2022-07-12 PROCEDURE — 36415 COLL VENOUS BLD VENIPUNCTURE: CPT | Performed by: NURSE PRACTITIONER

## 2022-07-12 PROCEDURE — P9604 ONE-WAY ALLOW PRORATED TRIP: HCPCS | Performed by: NURSE PRACTITIONER

## 2022-07-12 PROCEDURE — 85610 PROTHROMBIN TIME: CPT | Performed by: NURSE PRACTITIONER

## 2022-07-12 NOTE — PROGRESS NOTES
[facility] [place]    Background: Care Coordination referral placed from [facility] discharge report for reason of patient meeting criteria for a TCM outreach call by [facility] team.    Assessment: Upon chart review, CCR[facility] Team member will cancel/close the referral for TCM outreach due to reason below:    Patient has discharged to a Group home, Memory Care or Nursing Home.    Plan: Care Coordination referral for TCM outreach canceled.      Kenisha Watters MA  [facility] [place], [facility]    *Connected Care Resource Team does NOT follow patient ongoing. Referrals are identified based on internal discharge reports and the outreach is to ensure patient has an understanding of their discharge instructions.

## 2022-07-14 LAB
BACTERIA BLD CULT: NO GROWTH
BACTERIA BLD CULT: NO GROWTH
INR PPP: 1.16 (ref 0.85–1.15)

## 2022-07-14 PROCEDURE — P9604 ONE-WAY ALLOW PRORATED TRIP: HCPCS | Performed by: INTERNAL MEDICINE

## 2022-07-14 PROCEDURE — 36415 COLL VENOUS BLD VENIPUNCTURE: CPT | Performed by: INTERNAL MEDICINE

## 2022-07-14 PROCEDURE — 85610 PROTHROMBIN TIME: CPT | Performed by: INTERNAL MEDICINE

## 2022-07-15 ENCOUNTER — LAB REQUISITION (OUTPATIENT)
Dept: LAB | Facility: CLINIC | Age: 87
End: 2022-07-15
Payer: COMMERCIAL

## 2022-07-15 DIAGNOSIS — R79.1 ABNORMAL COAGULATION PROFILE: ICD-10-CM

## 2022-07-17 ENCOUNTER — LAB REQUISITION (OUTPATIENT)
Dept: LAB | Facility: CLINIC | Age: 87
End: 2022-07-17

## 2022-07-17 DIAGNOSIS — A04.71 ENTEROCOLITIS DUE TO CLOSTRIDIUM DIFFICILE, RECURRENT: ICD-10-CM

## 2022-07-17 LAB — C DIFF TOX B STL QL: NEGATIVE

## 2022-07-17 PROCEDURE — 87493 C DIFF AMPLIFIED PROBE: CPT | Performed by: INTERNAL MEDICINE

## 2022-07-18 ENCOUNTER — HOSPITAL ENCOUNTER (EMERGENCY)
Facility: CLINIC | Age: 87
Discharge: HOME | End: 2022-07-18
Attending: EMERGENCY MEDICINE | Admitting: EMERGENCY MEDICINE
Payer: COMMERCIAL

## 2022-07-18 ENCOUNTER — LAB REQUISITION (OUTPATIENT)
Dept: LAB | Facility: CLINIC | Age: 87
End: 2022-07-18

## 2022-07-18 VITALS
TEMPERATURE: 98.7 F | RESPIRATION RATE: 18 BRPM | HEIGHT: 71 IN | BODY MASS INDEX: 25.2 KG/M2 | OXYGEN SATURATION: 96 % | HEART RATE: 64 BPM | WEIGHT: 180 LBS | SYSTOLIC BLOOD PRESSURE: 138 MMHG | DIASTOLIC BLOOD PRESSURE: 63 MMHG

## 2022-07-18 DIAGNOSIS — U07.1 COVID-19: ICD-10-CM

## 2022-07-18 DIAGNOSIS — E86.1 HYPOTENSION DUE TO HYPOVOLEMIA: ICD-10-CM

## 2022-07-18 DIAGNOSIS — R19.7 DIARRHEA, UNSPECIFIED TYPE: ICD-10-CM

## 2022-07-18 LAB
ALBUMIN SERPL-MCNC: 2.9 G/DL (ref 3.5–5)
ALP SERPL-CCNC: 54 U/L (ref 45–120)
ALT SERPL W P-5'-P-CCNC: 28 U/L (ref 0–45)
ANION GAP SERPL CALCULATED.3IONS-SCNC: 4 MMOL/L (ref 5–18)
AST SERPL W P-5'-P-CCNC: 15 U/L (ref 0–40)
BILIRUB SERPL-MCNC: 0.5 MG/DL (ref 0–1)
BUN SERPL-MCNC: 14 MG/DL (ref 8–28)
CALCIUM SERPL-MCNC: 8.3 MG/DL (ref 8.5–10.5)
CHLORIDE BLD-SCNC: 107 MMOL/L (ref 98–107)
CO2 SERPL-SCNC: 26 MMOL/L (ref 22–31)
CREAT SERPL-MCNC: 0.74 MG/DL (ref 0.7–1.3)
ERYTHROCYTE [DISTWIDTH] IN BLOOD BY AUTOMATED COUNT: 14.2 % (ref 10–15)
GFR SERPL CREATININE-BSD FRML MDRD: 87 ML/MIN/1.73M2
GLUCOSE BLD-MCNC: 111 MG/DL (ref 70–125)
HCT VFR BLD AUTO: 39.6 % (ref 40–53)
HGB BLD-MCNC: 12.8 G/DL (ref 13.3–17.7)
INR PPP: 1.46 (ref 0.85–1.15)
MAGNESIUM SERPL-MCNC: 1.9 MG/DL (ref 1.8–2.6)
MCH RBC QN AUTO: 31.8 PG (ref 26.5–33)
MCHC RBC AUTO-ENTMCNC: 32.3 G/DL (ref 31.5–36.5)
MCV RBC AUTO: 99 FL (ref 78–100)
PLATELET # BLD AUTO: 228 10E3/UL (ref 150–450)
POTASSIUM BLD-SCNC: 4.3 MMOL/L (ref 3.5–5)
PROT SERPL-MCNC: 5.5 G/DL (ref 6–8)
RBC # BLD AUTO: 4.02 10E6/UL (ref 4.4–5.9)
SODIUM SERPL-SCNC: 137 MMOL/L (ref 136–145)
WBC # BLD AUTO: 5.3 10E3/UL (ref 4–11)

## 2022-07-18 PROCEDURE — 36415 COLL VENOUS BLD VENIPUNCTURE: CPT | Performed by: EMERGENCY MEDICINE

## 2022-07-18 PROCEDURE — 82040 ASSAY OF SERUM ALBUMIN: CPT | Performed by: EMERGENCY MEDICINE

## 2022-07-18 PROCEDURE — 85610 PROTHROMBIN TIME: CPT | Performed by: INTERNAL MEDICINE

## 2022-07-18 PROCEDURE — 83735 ASSAY OF MAGNESIUM: CPT | Performed by: EMERGENCY MEDICINE

## 2022-07-18 PROCEDURE — U0003 INFECTIOUS AGENT DETECTION BY NUCLEIC ACID (DNA OR RNA); SEVERE ACUTE RESPIRATORY SYNDROME CORONAVIRUS 2 (SARS-COV-2) (CORONAVIRUS DISEASE [COVID-19]), AMPLIFIED PROBE TECHNIQUE, MAKING USE OF HIGH THROUGHPUT TECHNOLOGIES AS DESCRIBED BY CMS-2020-01-R: HCPCS | Performed by: INTERNAL MEDICINE

## 2022-07-18 PROCEDURE — 85027 COMPLETE CBC AUTOMATED: CPT | Performed by: EMERGENCY MEDICINE

## 2022-07-18 PROCEDURE — 80053 COMPREHEN METABOLIC PANEL: CPT | Performed by: EMERGENCY MEDICINE

## 2022-07-18 PROCEDURE — 96361 HYDRATE IV INFUSION ADD-ON: CPT

## 2022-07-18 PROCEDURE — 96360 HYDRATION IV INFUSION INIT: CPT

## 2022-07-18 PROCEDURE — 36415 COLL VENOUS BLD VENIPUNCTURE: CPT | Performed by: INTERNAL MEDICINE

## 2022-07-18 PROCEDURE — P9604 ONE-WAY ALLOW PRORATED TRIP: HCPCS | Performed by: INTERNAL MEDICINE

## 2022-07-18 PROCEDURE — 99283 EMERGENCY DEPT VISIT LOW MDM: CPT | Mod: 25

## 2022-07-18 PROCEDURE — 258N000003 HC RX IP 258 OP 636: Performed by: EMERGENCY MEDICINE

## 2022-07-18 RX ADMIN — SODIUM CHLORIDE 1000 ML: 9 INJECTION, SOLUTION INTRAVENOUS at 17:28

## 2022-07-18 NOTE — ED TRIAGE NOTES
Pt here from TCU for hypotension. Pt has had generalized weakness x 7days and experienced a fall 7 days ago. Pt denies symptoms of hypotension and BP is 114/58 here. Pt reporting 10/12 loose stools in past day but none today,denies N/V. Pt on coumadin for Afib and most recent INR 1.42

## 2022-07-18 NOTE — ED PROVIDER NOTES
"EMERGENCY DEPARTMENT ENCOUNTER      NAME: Liz Shankar  AGE: 88 year old male  YOB: 1934  MRN: [id number]  EVALUATION DATE & TIME: 7/18/2022  4:28 PM    PCP: Luis Mcclellan    ED PROVIDER: Brian Villegas MD      Chief Complaint   Patient presents with     Hypotension     Diarrhea         FINAL IMPRESSION:  No diagnosis found.      ED COURSE & MEDICAL DECISION MAKING:    Pertinent Labs & Imaging studies reviewed. (See chart for details)  88 year old male presents to the Emergency Department for evaluation of \"low blood pressure\".  Patient reports feeling fine.  Did have a large amount of diarrhea the last 2 days but none today.  Blood pressure taken at care center evidently low and patient was sent here for evaluation.  On arrival patient is without complaints.  Vital signs normal.  Exam is benign.  Patient likely with hypovolemia secondary to extensive diarrhea.  Will obtain blood work to assess for electrolyte imbalance, anemia.  Intravenous fluids initiated 250 cc an hour.    6:01 PM.  White cell count normal at 5.3.  Minimal anemia hemoglobin 12.8.  7:02 PM.  BMP essentially normal.  Slightly reduced total protein.  Vital signs have been excellent here.  Patient with transient hypotension.  Likely related to previous diarrhea.  No evidence of obvious significant pathology presently.  Plan will be to return patient to his care facility.  At the conclusion of the encounter I discussed the results of all of the tests and the disposition. The questions were answered. The patient or family acknowledged understanding and was agreeable with the care plan.       MEDICATIONS GIVEN IN THE EMERGENCY:  Medications   0.9% sodium chloride BOLUS (1,000 mLs Intravenous New Bag 7/18/22 0218)       NEW PRESCRIPTIONS STARTED AT TODAY'S ER VISIT  New Prescriptions    No medications on file          =================================================================    HPI          Liz Shankar is a 88 year old " "male with a pertinent history of atrial fibrillation with reports \"low blood pressure\".  Patient comes from assisted care facility with reported low blood pressure.  Patient has had recent diarrhea and decreased oral intake.  Denies any abdominal pain.  Reports no diarrhea today.  No urinary symptoms.  Denies any chest pain or shortness of breath.  No unusual exposures or ingestions.      REVIEW OF SYSTEMS   Review of Systems negative fevers, chills.  No nausea or vomiting.  Positive diarrhea as noted.  No dysuria or frequency.  Remainder review of systems otherwise negative    PAST MEDICAL HISTORY:  Past Medical History:   Diagnosis Date     Atrial fibrillation (H)      Bilateral chronic knee pain      CAD (coronary artery disease)        PAST SURGICAL HISTORY:  History reviewed. No pertinent surgical history.        CURRENT MEDICATIONS:    bismuth subsalicylate (PEPTO BISMOL) 262 MG/15ML suspension  diltiazem ER COATED BEADS (CARDIZEM CD/CARTIA XT) 120 MG 24 hr capsule  docusate sodium (COLACE) 100 MG capsule  meloxicam (MOBIC) 7.5 MG tablet  metoprolol tartrate (LOPRESSOR) 50 MG tablet  tamsulosin (FLOMAX) 0.4 MG capsule  travoprost NURY FREE (TRAVATAN Z) 0.004 % ophthalmic solution  warfarin ANTICOAGULANT (COUMADIN) 1 MG tablet        ALLERGIES:  No Known Allergies    FAMILY HISTORY:  History reviewed. No pertinent family history.    SOCIAL HISTORY:   Social History     Socioeconomic History     Marital status:      Spouse name: None     Number of children: None     Years of education: None     Highest education level: None   Tobacco Use     Smoking status: Former Smoker     Packs/day: 1.00     Years: 10.00     Pack years: 10.00     Smokeless tobacco: Never Used   Substance and Sexual Activity     Alcohol use: Never       VITALS:  /59   Pulse 65   Temp 98.7  F (37.1  C) (Temporal)   Resp 16   Ht 1.803 m (5' 11\")   Wt 81.6 kg (180 lb)   SpO2 98%   BMI 25.10 kg/m      PHYSICAL EXAM  "   Constitutional: Well developed, Well nourished, NAD  HENT: Normocephalic, Atraumatic, Bilateral external ears normal, Oropharynx normal, mucous membranes moist, Nose normal. Neck-  Normal range of motion, No tenderness, Supple, No stridor.  Eyes: PERRL, EOMI, Conjunctiva normal, No discharge.   Respiratory: Normal breath sounds, No respiratory distress, No wheezing, Speaks full sentences easily. No cough.  Cardiovascular: Normal heart rate, Regular rhythm, 3/6 systolic ejection murmur, No rubs, No gallops. Chest wall nontender.  GI: No excessive obesity. Bowel sounds normal, Soft, No tenderness, No masses, No flank tenderness. No rebound or guarding.  Musculoskeletal:No cyanosis, No clubbing. Good range of motion in all major joints. No tenderness to palpation or major deformities noted.   Integument: Warm, Dry, No erythema, No rash. No petechiae.  Neurologic: Alert & oriented 2, slight confusion, Normal motor function, Normal sensory function, No focal deficits noted. Normal gait.  Psychiatric: Affect normal, Judgment normal, Mood normal. Cooperative.      LAB:  All pertinent labs reviewed and interpreted.  Results for orders placed or performed during the hospital encounter of 07/18/22   Comprehensive metabolic panel     Status: Abnormal   Result Value Ref Range    Sodium 137 136 - 145 mmol/L    Potassium 4.3 3.5 - 5.0 mmol/L    Chloride 107 98 - 107 mmol/L    Carbon Dioxide (CO2) 26 22 - 31 mmol/L    Anion Gap 4 (L) 5 - 18 mmol/L    Urea Nitrogen 14 8 - 28 mg/dL    Creatinine 0.74 0.70 - 1.30 mg/dL    Calcium 8.3 (L) 8.5 - 10.5 mg/dL    Glucose 111 70 - 125 mg/dL    Alkaline Phosphatase 54 45 - 120 U/L    AST 15 0 - 40 U/L    ALT 28 0 - 45 U/L    Protein Total 5.5 (L) 6.0 - 8.0 g/dL    Albumin 2.9 (L) 3.5 - 5.0 g/dL    Bilirubin Total 0.5 0.0 - 1.0 mg/dL    GFR Estimate 87 >60 mL/min/1.73m2   Magnesium     Status: Normal   Result Value Ref Range    Magnesium 1.9 1.8 - 2.6 mg/dL   CBC (+ platelets, no diff)      Status: Abnormal   Result Value Ref Range    WBC Count 5.3 4.0 - 11.0 10e3/uL    RBC Count 4.02 (L) 4.40 - 5.90 10e6/uL    Hemoglobin 12.8 (L) 13.3 - 17.7 g/dL    Hematocrit 39.6 (L) 40.0 - 53.0 %    MCV 99 78 - 100 fL    MCH 31.8 26.5 - 33.0 pg    MCHC 32.3 31.5 - 36.5 g/dL    RDW 14.2 10.0 - 15.0 %    Platelet Count 228 150 - 450 10e3/uL       RADIOLOGY:  Reviewed all pertinent imaging. Please see official radiology report.  No orders to display         Brian Villegas MD  [facility] EMERGENCY ROOM  [address] [address] [phone]     Brian Villegas MD  07/18/22 1958

## 2022-07-19 LAB — SARS-COV-2 RNA RESP QL NAA+PROBE: NEGATIVE

## 2022-07-20 ENCOUNTER — LAB REQUISITION (OUTPATIENT)
Dept: LAB | Facility: CLINIC | Age: 87
End: 2022-07-20
Payer: COMMERCIAL

## 2022-07-20 DIAGNOSIS — Z79.01 LONG TERM (CURRENT) USE OF ANTICOAGULANTS: ICD-10-CM

## 2022-07-21 ENCOUNTER — LAB REQUISITION (OUTPATIENT)
Dept: LAB | Facility: CLINIC | Age: 87
End: 2022-07-21
Payer: COMMERCIAL

## 2022-07-21 DIAGNOSIS — I48.0 PAROXYSMAL ATRIAL FIBRILLATION (H): ICD-10-CM

## 2022-07-21 LAB — INR PPP: 1.58 (ref 0.85–1.15)

## 2022-07-21 PROCEDURE — 85610 PROTHROMBIN TIME: CPT | Performed by: FAMILY MEDICINE

## 2022-07-21 PROCEDURE — P9604 ONE-WAY ALLOW PRORATED TRIP: HCPCS | Performed by: FAMILY MEDICINE

## 2022-07-21 PROCEDURE — 36415 COLL VENOUS BLD VENIPUNCTURE: CPT | Performed by: FAMILY MEDICINE

## 2022-07-25 LAB — INR PPP: 2.25 (ref 0.85–1.15)

## 2022-07-25 PROCEDURE — P9604 ONE-WAY ALLOW PRORATED TRIP: HCPCS | Performed by: INTERNAL MEDICINE

## 2022-07-25 PROCEDURE — 85610 PROTHROMBIN TIME: CPT | Performed by: INTERNAL MEDICINE

## 2022-07-25 PROCEDURE — 36415 COLL VENOUS BLD VENIPUNCTURE: CPT | Performed by: INTERNAL MEDICINE

## 2022-09-12 ENCOUNTER — HOSPITAL ENCOUNTER (OUTPATIENT)
Facility: CLINIC | Age: 87
End: 2022-09-12
Attending: ORTHOPAEDIC SURGERY | Admitting: ORTHOPAEDIC SURGERY
Payer: COMMERCIAL

## 2022-09-20 ENCOUNTER — APPOINTMENT (OUTPATIENT)
Dept: CT IMAGING | Facility: CLINIC | Age: 87
End: 2022-09-20
Attending: EMERGENCY MEDICINE
Payer: COMMERCIAL

## 2022-09-20 ENCOUNTER — HOSPITAL ENCOUNTER (OUTPATIENT)
Facility: CLINIC | Age: 87
Setting detail: OBSERVATION
Discharge: NURSING, REHAB OR LONG-TERM CARE | End: 2022-09-22
Attending: EMERGENCY MEDICINE | Admitting: STUDENT IN AN ORGANIZED HEALTH CARE EDUCATION/TRAINING PROGRAM
Payer: COMMERCIAL

## 2022-09-20 ENCOUNTER — APPOINTMENT (OUTPATIENT)
Dept: RADIOLOGY | Facility: CLINIC | Age: 87
End: 2022-09-20
Attending: EMERGENCY MEDICINE
Payer: COMMERCIAL

## 2022-09-20 DIAGNOSIS — S32.474A: ICD-10-CM

## 2022-09-20 DIAGNOSIS — I50.22 CHRONIC SYSTOLIC CHF (CONGESTIVE HEART FAILURE) (H): Primary | ICD-10-CM
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
## 2022-09-20 LAB
ANION GAP SERPL CALCULATED.3IONS-SCNC: 11 MMOL/L (ref 5–18)
BASOPHILS # BLD AUTO: 0.1 10E3/UL (ref 0–0.2)
BASOPHILS NFR BLD AUTO: 0 %
BUN SERPL-MCNC: 21 MG/DL (ref 8–28)
CALCIUM SERPL-MCNC: 8.7 MG/DL (ref 8.5–10.5)
CHLORIDE BLD-SCNC: 106 MMOL/L (ref 98–107)
CO2 SERPL-SCNC: 22 MMOL/L (ref 22–31)
CREAT SERPL-MCNC: 0.59 MG/DL (ref 0.7–1.3)
EOSINOPHIL # BLD AUTO: 0.2 10E3/UL (ref 0–0.7)
EOSINOPHIL NFR BLD AUTO: 2 %
ERYTHROCYTE [DISTWIDTH] IN BLOOD BY AUTOMATED COUNT: 13.8 % (ref 10–15)
GFR SERPL CREATININE-BSD FRML MDRD: >90 ML/MIN/1.73M2
GLUCOSE BLD-MCNC: 109 MG/DL (ref 70–125)
HCT VFR BLD AUTO: 42.4 % (ref 40–53)
HGB BLD-MCNC: 14 G/DL (ref 13.3–17.7)
HOLD SPECIMEN: NORMAL
HOLD SPECIMEN: NORMAL
IMM GRANULOCYTES # BLD: 0 10E3/UL
IMM GRANULOCYTES NFR BLD: 0 %
INR PPP: 1.56 (ref 0.85–1.15)
LYMPHOCYTES # BLD AUTO: 0.5 10E3/UL (ref 0.8–5.3)
LYMPHOCYTES NFR BLD AUTO: 4 %
MCH RBC QN AUTO: 32.3 PG (ref 26.5–33)
MCHC RBC AUTO-ENTMCNC: 33 G/DL (ref 31.5–36.5)
MCV RBC AUTO: 98 FL (ref 78–100)
MONOCYTES # BLD AUTO: 0.8 10E3/UL (ref 0–1.3)
MONOCYTES NFR BLD AUTO: 7 %
NEUTROPHILS # BLD AUTO: 10.1 10E3/UL (ref 1.6–8.3)
NEUTROPHILS NFR BLD AUTO: 87 %
NRBC # BLD AUTO: 0 10E3/UL
NRBC BLD AUTO-RTO: 0 /100
PLATELET # BLD AUTO: 168 10E3/UL (ref 150–450)
POTASSIUM BLD-SCNC: 3.8 MMOL/L (ref 3.5–5)
RBC # BLD AUTO: 4.34 10E6/UL (ref 4.4–5.9)
SARS-COV-2 RNA RESP QL NAA+PROBE: NEGATIVE
SODIUM SERPL-SCNC: 139 MMOL/L (ref 136–145)
WBC # BLD AUTO: 11.6 10E3/UL (ref 4–11)

## 2022-09-20 PROCEDURE — 73700 CT LOWER EXTREMITY W/O DYE: CPT | Mod: RT

## 2022-09-20 PROCEDURE — 73502 X-RAY EXAM HIP UNI 2-3 VIEWS: CPT

## 2022-09-20 PROCEDURE — 70450 CT HEAD/BRAIN W/O DYE: CPT

## 2022-09-20 PROCEDURE — U0005 INFEC AGEN DETEC AMPLI PROBE: HCPCS | Performed by: EMERGENCY MEDICINE

## 2022-09-20 PROCEDURE — 36415 COLL VENOUS BLD VENIPUNCTURE: CPT | Performed by: EMERGENCY MEDICINE

## 2022-09-20 PROCEDURE — G0378 HOSPITAL OBSERVATION PER HR: HCPCS

## 2022-09-20 PROCEDURE — 99220 PR INITIAL OBSERVATION CARE,LEVEL III: CPT | Performed by: STUDENT IN AN ORGANIZED HEALTH CARE EDUCATION/TRAINING PROGRAM

## 2022-09-20 PROCEDURE — 82374 ASSAY BLOOD CARBON DIOXIDE: CPT | Performed by: EMERGENCY MEDICINE

## 2022-09-20 PROCEDURE — 85610 PROTHROMBIN TIME: CPT | Performed by: EMERGENCY MEDICINE

## 2022-09-20 PROCEDURE — 258N000003 HC RX IP 258 OP 636: Performed by: EMERGENCY MEDICINE

## 2022-09-20 PROCEDURE — 85025 COMPLETE CBC W/AUTO DIFF WBC: CPT | Performed by: EMERGENCY MEDICINE

## 2022-09-20 PROCEDURE — 99285 EMERGENCY DEPT VISIT HI MDM: CPT | Mod: 25

## 2022-09-20 PROCEDURE — C9803 HOPD COVID-19 SPEC COLLECT: HCPCS

## 2022-09-20 RX ORDER — ATORVASTATIN CALCIUM 10 MG/1
20 TABLET, FILM COATED ORAL DAILY
Status: DISCONTINUED | OUTPATIENT
Start: 2022-09-21 | End: 2022-09-22 | Stop reason: HOSPADM

## 2022-09-20 RX ORDER — TAMSULOSIN HYDROCHLORIDE 0.4 MG/1
0.4 CAPSULE ORAL DAILY
Status: DISCONTINUED | OUTPATIENT
Start: 2022-09-21 | End: 2022-09-22 | Stop reason: HOSPADM

## 2022-09-20 RX ORDER — WARFARIN SODIUM 5 MG/1
5-7.5 TABLET ORAL DAILY
Status: ON HOLD | COMMUNITY
End: 2024-01-01

## 2022-09-20 RX ORDER — ATORVASTATIN CALCIUM 20 MG/1
20 TABLET, FILM COATED ORAL DAILY
COMMUNITY
End: 2022-12-20

## 2022-09-20 RX ORDER — METOPROLOL TARTRATE 50 MG
50 TABLET ORAL 2 TIMES DAILY
Status: DISCONTINUED | OUTPATIENT
Start: 2022-09-21 | End: 2022-09-22 | Stop reason: HOSPADM

## 2022-09-20 RX ORDER — ACETAMINOPHEN 325 MG/1
650 TABLET ORAL EVERY 6 HOURS PRN
Status: DISCONTINUED | OUTPATIENT
Start: 2022-09-20 | End: 2022-09-21

## 2022-09-20 RX ORDER — WARFARIN SODIUM 5 MG/1
5 TABLET ORAL ONCE
Status: COMPLETED | OUTPATIENT
Start: 2022-09-21 | End: 2022-09-21

## 2022-09-20 RX ORDER — DILTIAZEM HYDROCHLORIDE 120 MG/1
120 CAPSULE, COATED, EXTENDED RELEASE ORAL DAILY
Status: DISCONTINUED | OUTPATIENT
Start: 2022-09-21 | End: 2022-09-22 | Stop reason: HOSPADM

## 2022-09-20 RX ORDER — TRAVOPROST OPHTHALMIC SOLUTION 0.04 MG/ML
1 SOLUTION OPHTHALMIC AT BEDTIME
Status: DISCONTINUED | OUTPATIENT
Start: 2022-09-21 | End: 2022-09-22 | Stop reason: HOSPADM

## 2022-09-20 RX ORDER — ONDANSETRON 4 MG/1
4 TABLET, ORALLY DISINTEGRATING ORAL EVERY 6 HOURS PRN
Status: DISCONTINUED | OUTPATIENT
Start: 2022-09-20 | End: 2022-09-22 | Stop reason: HOSPADM

## 2022-09-20 RX ORDER — ONDANSETRON 2 MG/ML
4 INJECTION INTRAMUSCULAR; INTRAVENOUS EVERY 6 HOURS PRN
Status: DISCONTINUED | OUTPATIENT
Start: 2022-09-20 | End: 2022-09-22 | Stop reason: HOSPADM

## 2022-09-20 RX ORDER — ACETAMINOPHEN 650 MG/1
650 SUPPOSITORY RECTAL EVERY 6 HOURS PRN
Status: DISCONTINUED | OUTPATIENT
Start: 2022-09-20 | End: 2022-09-21

## 2022-09-20 RX ORDER — DOCUSATE SODIUM 100 MG/1
100 CAPSULE, LIQUID FILLED ORAL 2 TIMES DAILY
Status: DISCONTINUED | OUTPATIENT
Start: 2022-09-21 | End: 2022-09-22 | Stop reason: HOSPADM

## 2022-09-20 RX ADMIN — SODIUM CHLORIDE 500 ML: 9 INJECTION, SOLUTION INTRAVENOUS at 22:40

## 2022-09-20 ASSESSMENT — ACTIVITIES OF DAILY LIVING (ADL)
ADLS_ACUITY_SCORE: 36
ADLS_ACUITY_SCORE: 33
ADLS_ACUITY_SCORE: 36
ADLS_ACUITY_SCORE: 35
ADLS_ACUITY_SCORE: 36

## 2022-09-20 ASSESSMENT — ENCOUNTER SYMPTOMS: ARTHRALGIAS: 1

## 2022-09-20 NOTE — ED PROVIDER NOTES
EMERGENCY DEPARTMENT ENCOUNTER      NAME: Liz Shankar  AGE: 88 year old male  YOB: 1934  MRN: [id number]  EVALUATION DATE & TIME: 9/20/2022  2:46 PM    PCP: Luis Mcclellan    ED PROVIDER: Raj Montaño M.D.      Chief Complaint   Patient presents with     Fall     Hip Pain         FINAL IMPRESSION:  1.  Acute fall.  2.  Acute right hip pain.  3.  Acute right acetabular fracture.    ED COURSE & MEDICAL DECISION MAKING:    3:26 PM I met with the patient to gather history and to perform my initial exam. We discussed plans for the ED course, including diagnostic testing and treatment. PPE worn: cloth mask, gloves, glasses.  Patient with right hip pain to lateral touch ever since he had a fall on Saturday.  Patient tripped on the counter.  He did not contact his head or upper back.  He is complaining of any back pain, neck pain or head pain.  Patient does take Coumadin but again did not strike his head.  He denies any neurological deficits.  X-ray of the hip and INR pending.  7:51 PM I spoke with Dr. Navarro, [facility]. May be able to go home with a walker he has at home. Limited walking on right leg. Will test with walker here in ED.   8:13 PM.  Patient unable to tolerate walking without weightbearing on the right even with his walker.  Plan admit patient to MedSurg observation.  We will discuss this with the admitting service.  I spoke with hospitalist.  Patient will be admitted to MedSurg observation.  Nursing thought he had some confusion earlier.  A head CT was ordered given the fact he is on Coumadin.  No focal neurological deficits.    Pertinent Labs & Imaging studies reviewed. (See chart for details)    88 year old male presents to the Emergency Department for evaluation of right hip pain after fall.    At the conclusion of the encounter I discussed the results of all of the tests and the disposition. The questions were answered. The patient or family acknowledged understanding and  was agreeable with the care plan.       MEDICATIONS GIVEN IN THE EMERGENCY:  Medications - No data to display    NEW PRESCRIPTIONS STARTED AT TODAY'S ER VISIT  New Prescriptions    No medications on file          =================================================================    HPI    Patient information was obtained from: Patient    Use of : N/A         Liz Shankar is a 88 year old male with a pertinent history of falls, hypertension, and atrial fibrillation who presents to this ED by EMS for evaluation of fall.    Patient reports that he had a mechanical fall three days ago landing on his right hip on carpeted floor. Denies hitting his head or loss of consciousness. He has had right hip pain since the fall but has been able to ambulate. Is on coumadin. 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    He does not identify any waxing or waning symptoms otherwise, exacerbating or alleviating features,associated symptoms except as mentioned. He denies any pain related complaints.    REVIEW OF SYSTEMS   Review of Systems   Musculoskeletal: Positive for arthralgias (right hip).   All other systems reviewed and are negative.       PAST MEDICAL HISTORY:  Past Medical History:   Diagnosis Date     Atrial fibrillation (H)      Bilateral chronic knee pain      CAD (coronary artery disease)        PAST SURGICAL HISTORY:  History reviewed. No pertinent surgical history.        CURRENT MEDICATIONS:    bismuth subsalicylate (PEPTO BISMOL) 262 MG/15ML suspension  diltiazem ER COATED BEADS (CARDIZEM CD/CARTIA XT) 120 MG 24 hr capsule  docusate sodium (COLACE) 100 MG capsule  meloxicam (MOBIC) 7.5 MG tablet  metoprolol tartrate (LOPRESSOR) 50 MG tablet  tamsulosin (FLOMAX) 0.4 MG capsule  travoprost NURY FREE (TRAVATAN Z) 0.004 % ophthalmic solution  warfarin ANTICOAGULANT (COUMADIN) 1 MG tablet        ALLERGIES:  No Known Allergies    FAMILY HISTORY:  History reviewed. No pertinent family  "history.    SOCIAL HISTORY:   Social History     Socioeconomic History     Marital status:      Spouse name: None     Number of children: None     Years of education: None     Highest education level: None   Tobacco Use     Smoking status: Former Smoker     Packs/day: 1.00     Years: 10.00     Pack years: 10.00     Smokeless tobacco: Never Used   Substance and Sexual Activity     Alcohol use: Never       VITALS:  /62   Pulse 115   Temp 98.3  F (36.8  C) (Oral)   Resp 20   Ht 1.803 m (5' 11\")   Wt 79.4 kg (175 lb)   SpO2 96%   BMI 24.41 kg/m      PHYSICAL EXAM    Vital Signs:  /62   Pulse 115   Temp 98.3  F (36.8  C) (Oral)   Resp 20   Ht 1.803 m (5' 11\")   Wt 79.4 kg (175 lb)   SpO2 96%   BMI 24.41 kg/m    General:  On entering the room he is in no apparent distress.    Neck:  Neck supple with full range of motion and nontender.    Back:  Back and spine are nontender.  No costovertebral angle tenderness.    HEENT:  Oropharynx clear with moist mucous membranes.  HEENT unremarkable.    Pulmonary:  Chest clear to auscultation without rhonchi rales or wheezing.    Cardiovascular:  Cardiac regular rate and rhythm without murmurs rubs or gallops.    Abdomen:  Abdomen soft nontender.  There is no rebound or guarding.    Muskuloskeletal:  he moves all 4 without any difficulty and has normal neurovascular exams.  Extremities without clubbing, cyanosis, or edema.  Legs and calves are nontender.  The right leg is out to full length, not externally rotated.  Slight tenderness to palpation of the lateral hip.  But no pain in the hip with movement of the leg.  Neuro:  he is alert and oriented ×3 and moves all extremities symmetrically.    Psych:  Normal affect.    Skin:  Unremarkable and warm and dry.       LAB:  All pertinent labs reviewed and interpreted.  Labs Ordered and Resulted from Time of ED Arrival to Time of ED Departure   INR - Abnormal       Result Value    INR 1.56 (*)    BASIC " METABOLIC PANEL - Abnormal    Sodium 139      Potassium 3.8      Chloride 106      Carbon Dioxide (CO2) 22      Anion Gap 11      Urea Nitrogen 21      Creatinine 0.59 (*)     Calcium 8.7      Glucose 109      GFR Estimate >90     COVID-19 VIRUS (CORONAVIRUS) BY PCR - Normal    SARS CoV2 PCR Negative     CBC WITH PLATELETS AND DIFFERENTIAL       RADIOLOGY:  Reviewed all pertinent imaging. Please see official radiology report.  CT Hip Right w/o Contrast   Final Result   IMPRESSION:   1.  Acute nondisplaced right acetabular fracture. The fractures involve the medial acetabulum with extension superiorly along the medial ilium and anterior medial acetabulum.   2.  Small right hip joint effusion.   3.  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   4.  Osteopenia.         XR Pelvis and Hip Right 2 Views   Final Result   IMPRESSION: No fracture or dislocation. Osteopenia. Mild degenerative changes in both hips.          I, Gordon Alexander, am serving as a scribe to document services personally performed by Dr. Montaño based on my observation and the provider's statements to me. I, Raj Montaño MD attest that Gordon Alexander is acting in a scribe capacity, has observed my performance of the services and has documented them in accordance with my direction.    Raj Montaño M.D.  Emergency Medicine  [facility] EMERGENCY ROOM  [address] [place] [address] [phone]  Dept: [phone]     Raj Montaño MD  09/20/22 2013       Raj Montaño MD  09/20/22 2037

## 2022-09-20 NOTE — ED NOTES
"Writer spoke with Porsche who is the neighbor that called EMS. Per pt she helps take care of him and has been helping since family lives farther away. Her number is [phone]. Porsche will be transporting pt, so she would like another update when results come back. She also mentioned she has contemplated filing a \"vulnerable adult\" on this pt. He lives a lone in a split level home with no family around. She does not think he would be safe to go home.   "

## 2022-09-20 NOTE — ED TRIAGE NOTES
Pt arrives via EMS after a concerned neighbor called the police. Pt states he fell on Saturday when he tripped on the carpeted floor.  Pt states he landed on his right side and has been having right hip pain since.  Pt has been walking with a walker. Pt denies hitting his head or LOC.  EMS reports concerns of patient's mental status and his house status.  EMS states pt was claiming people were in his house taking pictures.

## 2022-09-21 ENCOUNTER — APPOINTMENT (OUTPATIENT)
Dept: PHYSICAL THERAPY | Facility: CLINIC | Age: 87
End: 2022-09-21
Attending: STUDENT IN AN ORGANIZED HEALTH CARE EDUCATION/TRAINING PROGRAM
Payer: COMMERCIAL

## 2022-09-21 ENCOUNTER — APPOINTMENT (OUTPATIENT)
Dept: ULTRASOUND IMAGING | Facility: CLINIC | Age: 87
End: 2022-09-21
Attending: INTERNAL MEDICINE
Payer: COMMERCIAL

## 2022-09-21 ENCOUNTER — APPOINTMENT (OUTPATIENT)
Dept: CARDIOLOGY | Facility: CLINIC | Age: 87
End: 2022-09-21
Attending: STUDENT IN AN ORGANIZED HEALTH CARE EDUCATION/TRAINING PROGRAM
Payer: COMMERCIAL

## 2022-09-21 LAB
BASOPHILS # BLD AUTO: 0.1 10E3/UL (ref 0–0.2)
BASOPHILS NFR BLD AUTO: 1 %
EOSINOPHIL # BLD AUTO: 0.3 10E3/UL (ref 0–0.7)
EOSINOPHIL NFR BLD AUTO: 3 %
ERYTHROCYTE [DISTWIDTH] IN BLOOD BY AUTOMATED COUNT: 13.9 % (ref 10–15)
HCT VFR BLD AUTO: 37.5 % (ref 40–53)
HGB BLD-MCNC: 12.3 G/DL (ref 13.3–17.7)
IMM GRANULOCYTES # BLD: 0 10E3/UL
IMM GRANULOCYTES NFR BLD: 0 %
INR PPP: 1.62 (ref 0.85–1.15)
LVEF ECHO: NORMAL
LYMPHOCYTES # BLD AUTO: 0.7 10E3/UL (ref 0.8–5.3)
LYMPHOCYTES NFR BLD AUTO: 7 %
MCH RBC QN AUTO: 32.4 PG (ref 26.5–33)
MCHC RBC AUTO-ENTMCNC: 32.8 G/DL (ref 31.5–36.5)
MCV RBC AUTO: 99 FL (ref 78–100)
MONOCYTES # BLD AUTO: 0.9 10E3/UL (ref 0–1.3)
MONOCYTES NFR BLD AUTO: 9 %
NEUTROPHILS # BLD AUTO: 7.8 10E3/UL (ref 1.6–8.3)
NEUTROPHILS NFR BLD AUTO: 80 %
NRBC # BLD AUTO: 0 10E3/UL
NRBC BLD AUTO-RTO: 0 /100
PLATELET # BLD AUTO: 167 10E3/UL (ref 150–450)
RBC # BLD AUTO: 3.8 10E6/UL (ref 4.4–5.9)
WBC # BLD AUTO: 9.8 10E3/UL (ref 4–11)

## 2022-09-21 PROCEDURE — 97162 PT EVAL MOD COMPLEX 30 MIN: CPT | Mod: GP

## 2022-09-21 PROCEDURE — G0378 HOSPITAL OBSERVATION PER HR: HCPCS

## 2022-09-21 PROCEDURE — 85004 AUTOMATED DIFF WBC COUNT: CPT | Performed by: STUDENT IN AN ORGANIZED HEALTH CARE EDUCATION/TRAINING PROGRAM

## 2022-09-21 PROCEDURE — 250N000013 HC RX MED GY IP 250 OP 250 PS 637: Performed by: HOSPITALIST

## 2022-09-21 PROCEDURE — 93306 TTE W/DOPPLER COMPLETE: CPT

## 2022-09-21 PROCEDURE — 250N000013 HC RX MED GY IP 250 OP 250 PS 637: Performed by: STUDENT IN AN ORGANIZED HEALTH CARE EDUCATION/TRAINING PROGRAM

## 2022-09-21 PROCEDURE — 93971 EXTREMITY STUDY: CPT | Mod: LT

## 2022-09-21 PROCEDURE — 36415 COLL VENOUS BLD VENIPUNCTURE: CPT | Performed by: STUDENT IN AN ORGANIZED HEALTH CARE EDUCATION/TRAINING PROGRAM

## 2022-09-21 PROCEDURE — 85610 PROTHROMBIN TIME: CPT | Performed by: STUDENT IN AN ORGANIZED HEALTH CARE EDUCATION/TRAINING PROGRAM

## 2022-09-21 PROCEDURE — 99226 PR SUBSEQUENT OBSERVATION CARE,LEVEL III: CPT | Performed by: STUDENT IN AN ORGANIZED HEALTH CARE EDUCATION/TRAINING PROGRAM

## 2022-09-21 PROCEDURE — 97530 THERAPEUTIC ACTIVITIES: CPT | Mod: GP

## 2022-09-21 PROCEDURE — 93306 TTE W/DOPPLER COMPLETE: CPT | Mod: 26 | Performed by: INTERNAL MEDICINE

## 2022-09-21 PROCEDURE — 250N000013 HC RX MED GY IP 250 OP 250 PS 637: Performed by: INTERNAL MEDICINE

## 2022-09-21 RX ORDER — NALOXONE HYDROCHLORIDE 0.4 MG/ML
0.2 INJECTION, SOLUTION INTRAMUSCULAR; INTRAVENOUS; SUBCUTANEOUS
Status: DISCONTINUED | OUTPATIENT
Start: 2022-09-21 | End: 2022-09-22 | Stop reason: HOSPADM

## 2022-09-21 RX ORDER — TROLAMINE SALICYLATE 10 G/100G
CREAM TOPICAL 4 TIMES DAILY PRN
Status: DISCONTINUED | OUTPATIENT
Start: 2022-09-21 | End: 2022-09-22 | Stop reason: HOSPADM

## 2022-09-21 RX ORDER — HYDROCODONE BITARTRATE AND ACETAMINOPHEN 5; 325 MG/1; MG/1
1 TABLET ORAL EVERY 4 HOURS PRN
Status: DISCONTINUED | OUTPATIENT
Start: 2022-09-21 | End: 2022-09-22 | Stop reason: HOSPADM

## 2022-09-21 RX ORDER — NALOXONE HYDROCHLORIDE 0.4 MG/ML
0.4 INJECTION, SOLUTION INTRAMUSCULAR; INTRAVENOUS; SUBCUTANEOUS
Status: DISCONTINUED | OUTPATIENT
Start: 2022-09-21 | End: 2022-09-22 | Stop reason: HOSPADM

## 2022-09-21 RX ORDER — ACETAMINOPHEN 650 MG/1
650 SUPPOSITORY RECTAL EVERY 6 HOURS PRN
Status: DISCONTINUED | OUTPATIENT
Start: 2022-09-21 | End: 2022-09-22 | Stop reason: HOSPADM

## 2022-09-21 RX ORDER — HYDROMORPHONE HCL IN WATER/PF 6 MG/30 ML
0.2 PATIENT CONTROLLED ANALGESIA SYRINGE INTRAVENOUS EVERY 4 HOURS PRN
Status: DISCONTINUED | OUTPATIENT
Start: 2022-09-21 | End: 2022-09-22 | Stop reason: HOSPADM

## 2022-09-21 RX ORDER — ACETAMINOPHEN 325 MG/1
650 TABLET ORAL EVERY 4 HOURS PRN
Status: DISCONTINUED | OUTPATIENT
Start: 2022-09-21 | End: 2022-09-22 | Stop reason: HOSPADM

## 2022-09-21 RX ADMIN — HYDROCODONE BITARTRATE AND ACETAMINOPHEN 1 TABLET: 5; 325 TABLET ORAL at 17:43

## 2022-09-21 RX ADMIN — METOPROLOL TARTRATE 50 MG: 50 TABLET, FILM COATED ORAL at 09:21

## 2022-09-21 RX ADMIN — HYDROCODONE BITARTRATE AND ACETAMINOPHEN 1 TABLET: 5; 325 TABLET ORAL at 06:48

## 2022-09-21 RX ADMIN — METOPROLOL TARTRATE 50 MG: 50 TABLET, FILM COATED ORAL at 01:11

## 2022-09-21 RX ADMIN — DOCUSATE SODIUM 100 MG: 100 CAPSULE, LIQUID FILLED ORAL at 20:17

## 2022-09-21 RX ADMIN — DILTIAZEM HYDROCHLORIDE 120 MG: 120 CAPSULE, COATED, EXTENDED RELEASE ORAL at 09:21

## 2022-09-21 RX ADMIN — WARFARIN SODIUM 7.5 MG: 2.5 TABLET ORAL at 17:39

## 2022-09-21 RX ADMIN — METOPROLOL TARTRATE 50 MG: 50 TABLET, FILM COATED ORAL at 20:17

## 2022-09-21 RX ADMIN — ATORVASTATIN CALCIUM 20 MG: 10 TABLET, FILM COATED ORAL at 09:21

## 2022-09-21 RX ADMIN — DOCUSATE SODIUM 100 MG: 100 CAPSULE, LIQUID FILLED ORAL at 09:21

## 2022-09-21 RX ADMIN — TRAVOPROST 1 DROP: 0.04 SOLUTION OPHTHALMIC at 01:12

## 2022-09-21 RX ADMIN — ACETAMINOPHEN 650 MG: 325 TABLET, FILM COATED ORAL at 04:14

## 2022-09-21 RX ADMIN — TRAVOPROST 1 DROP: 0.04 SOLUTION OPHTHALMIC at 20:17

## 2022-09-21 RX ADMIN — HYDROCODONE BITARTRATE AND ACETAMINOPHEN 1 TABLET: 5; 325 TABLET ORAL at 22:07

## 2022-09-21 RX ADMIN — TAMSULOSIN HYDROCHLORIDE 0.4 MG: 0.4 CAPSULE ORAL at 09:21

## 2022-09-21 RX ADMIN — BENZOCAINE AND MENTHOL 1 LOZENGE: 15; 3.6 LOZENGE ORAL at 22:07

## 2022-09-21 RX ADMIN — WARFARIN SODIUM 5 MG: 5 TABLET ORAL at 01:11

## 2022-09-21 ASSESSMENT — ACTIVITIES OF DAILY LIVING (ADL)
ADLS_ACUITY_SCORE: 32
ADLS_ACUITY_SCORE: 33
ADLS_ACUITY_SCORE: 32
ADLS_ACUITY_SCORE: 33
ADLS_ACUITY_SCORE: 33
ADLS_ACUITY_SCORE: 32
DEPENDENT_IADLS:: INDEPENDENT
ADLS_ACUITY_SCORE: 33
ADLS_ACUITY_SCORE: 32
ADLS_ACUITY_SCORE: 32

## 2022-09-21 NOTE — PROGRESS NOTES
09/21/22 1000   Quick Adds   Quick Adds Certification   Type of Visit Initial PT Evaluation   Living Environment   People in Home alone   Current Living Arrangements house   Home Accessibility stairs within home   Number of Stairs, Within Home, Primary seven   Self-Care   Usual Activity Tolerance good   Current Activity Tolerance fair   Equipment Currently Used at Home walker, rolling   Fall history within last six months yes   Number of times patient has fallen within last six months 3   General Information   Onset of Illness/Injury or Date of Surgery 09/20/22   Referring Physician Dr. Flores   Patient/Family Therapy Goals Statement (PT) Decrease pain with mobility   Pertinent History of Current Problem (include personal factors and/or comorbidities that impact the POC) Fall, hip fracture   Existing Precautions/Restrictions weight bearing   Weight-Bearing Status - LLE full weight-bearing   Weight-Bearing Status - RLE weight-bearing as tolerated   Range of Motion (ROM)   ROM Comment WFL, decreased RLE s/p fracture/pain   Strength (Manual Muscle Testing)   Strength Comments Unable to assess   Bed Mobility   Bed Mobility sit-supine   Sit-Supine [place] (Bed Mobility) supervision   Transfers   Transfers sit-stand transfer   Sit-Stand Transfer   Sit-Stand [place] (Transfers) contact guard   Clinical Impression   Criteria for Skilled Therapeutic Intervention Yes, treatment indicated   PT Diagnosis (PT) Impaired functional mobility   Influenced by the following impairments Weakness, pain   Functional limitations due to impairments Transfers, gait, stairs   Clinical Presentation (PT Evaluation Complexity) Evolving/Changing   Clinical Presentation Rationale Pt presents as medically diagnosed   Clinical Decision Making (Complexity) moderate complexity   Planned Therapy Interventions (PT) gait training;home exercise program;patient/family education;ROM (range of motion);stair training;strengthening   Anticipated  Equipment Needs at Discharge (PT) walker, rolling   Risk & Benefits of therapy have been explained care plan/treatment goals reviewed;patient   PT Discharge Planning   PT Discharge Recommendation (DC Rec) (S)  Transitional Care Facility;Long term care facility;home with assist;home with home care physical therapy   PT Rationale for DC Rec Unable to assess ambulation/stairs at this time due to pain and pt not feeling stable, would need to improve significantly to go home and still should likely have assist with medication mgmt and continues to be a fall risk. Pt should be considered for TCU or long-term care facility. Should be seen by OT for ADL assessment and cognitive screening as pt reports not taking medications last couple weeks and thinking month was October.   Therapy Certification   Start of care date 09/20/22   Certification date from 09/20/22   Certification date to 10/20/22   Medical Diagnosis Hip fracture   Total Evaluation Time   Total Evaluation Time (Minutes) 10   Physical Therapy Goals   PT Frequency Daily   PT Predicted Duration/Target Date for Goal Attainment 09/26/22   PT Goals Transfers;Gait;Stairs;PT Goal 1   PT: Transfers Supervision/stand-by assist;Sit to/from stand   PT: Gait Supervision/stand-by assist;Rolling walker;50 feet   PT: Stairs Supervision/stand-by assist;4 stairs;Rail on both sides   PT: Goal 1 Independent with HEP for LE strengthening after initial education and cueing

## 2022-09-21 NOTE — PROGRESS NOTES
Pt with increasing restlessness & c/o pain, repositioned multiple times and sitting on the edge of the bed was most comfortable. Not able to identify type of pain at first. With time he was able to say it was in his knees and left shin/calf. MD notified for need of other pain meds. Was assessed by MD and ultrasound also ordered to assess for DVT. Given Vicodin and US tech enroute.

## 2022-09-21 NOTE — PHARMACY-ANTICOAGULATION SERVICE
Clinical Pharmacy - Warfarin Dosing Consult     Pharmacy has been consulted to manage this patient s warfarin therapy.  Indication: Atrial Fibrillation  Therapy Goal: INR 2-3  Provider/Team: KARIN  Warfarin Prior to Admission: Yes  Warfarin PTA Regimen: 7.5mg on Friday, 5mg Rest of week  Recent documented change in oral intake/nutrition: Unknown  Dose Comments: 5mg x 1 now for home dose 9/20    INR   Date Value Ref Range Status   09/20/2022 1.56 (H) 0.85 - 1.15 Final   07/25/2022 2.25 (H) 0.85 - 1.15 Final       Recommend warfarin 5 mg now.  Pharmacy will monitor Liz Shankar daily and order warfarin doses to achieve specified goal.      Please contact pharmacy as soon as possible if the warfarin needs to be held for a procedure or if the warfarin goals change.

## 2022-09-21 NOTE — PROGRESS NOTES
Left leg discomfort/pain -- will get leg US - check DVT     Also poor pain control, worse when moving -- admitted for pelvic fracture

## 2022-09-21 NOTE — PHARMACY-ADMISSION MEDICATION HISTORY
Pharmacy Note - Admission Medication History    Pertinent Provider Information:     Patient said he hasn't taken any medications for about 2 weeks except warfarin and travoprost. He mentions he just hasn't filled his pill box.  He also thought it was October so he may have some altered mental status     ______________________________________________________________________    Prior To Admission (PTA) med list completed and updated in EMR.       PTA Med List   Medication Sig Note Last Dose     atorvastatin (LIPITOR) 20 MG tablet Take 20 mg by mouth daily 9/20/2022: Prescribed 8/3/22 Unknown at Unknown time     bismuth subsalicylate (PEPTO BISMOL) 262 MG/15ML suspension Take 15 mLs by mouth every 6 hours as needed       diltiazem ER COATED BEADS (CARDIZEM CD/CARTIA XT) 120 MG 24 hr capsule Take 120 mg by mouth daily       docusate sodium (COLACE) 100 MG capsule Take 1 capsule (100 mg) by mouth 2 times daily  ~ 2 weeks ago     metoprolol tartrate (LOPRESSOR) 50 MG tablet Take 1 tablet (50 mg) by mouth 2 times daily  ~ 2 weeks ago     tamsulosin (FLOMAX) 0.4 MG capsule Take 1 capsule (0.4 mg) by mouth daily  ~ 2 weeks ago     travoprost BAK FREE (TRAVATAN Z) 0.004 % ophthalmic solution Place 1 drop into both eyes At Bedtime  9/18/2022 at doesn't have with     warfarin ANTICOAGULANT (COUMADIN) 5 MG tablet Take 5-7.5 mg by mouth daily 7.5mg on Fridays and 5mg the rest of the days of the week  9/19/2022 at Unknown time       Information source(s): Patient and Prescription bottles  Method of interview communication: in-person    Summary of Changes to PTA Med List  New: atorvastatin  Discontinued: meloxicam - wasn't positive he was taking  Changed: none    Patient was asked about OTC/herbal products specifically.  PTA med list reflects this.    In the past week, patient estimated taking medication this percent of the time:  greater than 90%.    Allergies were reviewed, assessed, and updated with the patient.      Patient did  not bring any medications to the hospital and can't retrieve from home. No multi-dose medications are available for use during hospital stay.     The information provided in this note is only as accurate as the sources available at the time of the update(s).    Thank you for the opportunity to participate in the care of this patient.    Annemarie Gómez RPH  9/20/2022 9:35 PM

## 2022-09-21 NOTE — CONSULTS
Care Management Initial Consult    General Information  Assessment completed with: Patient,    Type of CM/SW Visit: Initial Assessment    Primary Care Provider verified and updated as needed:     Readmission within the last 30 days: no previous admission in last 30 days      Reason for Consult: discharge planning  Advance Care Planning:            Communication Assessment  Patient's communication style: spoken language (English or Bilingual)    Hearing Difficulty or Deaf: no        Cognitive  Cognitive/Neuro/Behavioral: WDL                      Living Environment:   People in home: alone     Current living Arrangements: house      Able to return to prior arrangements: no (wants to go to [facility]       Family/Social Support:  Care provided by: self  Provides care for: no one, unable/limited ability to care for self                Description of Support System:    Very limited support at home. Friend who is a RN lives across the street and does help out when she can.       Current Resources:   Patient receiving home care services:  none     Equipment currently used at home:  Walker  Supplies currently used at home:               Lifestyle & Psychosocial Needs:  Social Determinants of Health     Tobacco Use: Medium Risk     Smoking Tobacco Use: Former Smoker     Smokeless Tobacco Use: Never Used   Alcohol Use: Not on file   Financial Resource Strain: Not on file   Food Insecurity: Not on file   Transportation Needs: Not on file   Physical Activity: Not on file   Stress: Not on file   Social Connections: Not on file   Intimate Partner Violence: Not on file   Depression: Not on file   Housing Stability: Not on file       Functional Status:  Prior to admission patient needed assistance:   Dependent ADLs:: Independent  Dependent IADLs:: Independent              Additional Information:  CM assessed patient bedside. He shares he has been doing fair at home. Lives alone. Has a dog. Brother lives 200 miles away is his POA. He  "feels he will need TCU at discharge. Does not think he can go home, and would like to return to [facility], as he was there before. He has been driving, and \"independent\", however shares its been harder to take care of things. \"not getting around like I used to\".  CM will continue to follow along with discharge planning.     Dalila Goins RN           "

## 2022-09-21 NOTE — ED NOTES
Attempted to ambulate patient with walker without bearing weight on the Right leg. Patient from the very beginning was putting weight on the whole right leg. Writer was not able to teach the patient how to not put any weight on the leg and walk at the same time, patient understood what to do though was not able to do it. MD updated.

## 2022-09-21 NOTE — CONSULTS
ORTHOPEDIC CONSULTATION    Consultation  Liz Shankar,  5/10/1934, MRN [id number]    [unfilled]  Closed nondisplaced fracture of medial wall of right acetabulum, initial encounter (H) [S32.474A]  Fall, initial encounter [W19.XXXA]    PCP: Luis Mcclellan, [phone]   Code status:  Full Code       Extended Emergency Contact Information  Primary Emergency Contact: [facility]  Home Phone: [phone]  Mobile Phone: [phone]  Relation: Other  Secondary Emergency Contact: Porsche [facility]amino [facility])  Home Phone: [phone]  Mobile Phone: [phone]  Relation: Other         IMPRESSION: Closed nondisplaced Right acetabular fracture after mechanical fall     PLAN:  This patient was discussed with Dr. Tinajero, on-call surgeon for [facility] and they are in agreement with the following plan.     -Reviewed CT of Right hip which shows a nondisplaced Right acetabular fracture and mild OA. Xray did not show fracture. Do not recommend surgical intervention.  -PT/OT evaluation.   -Protected weight bearing with 50% or less weight bearing with walker.  -Pain control. Continue Norco & Tylenol.  -Follow-up in 2-4 weeks with [facility] for repeat xrays.    Thank you for including [facility] in the care of Liz Shankar. It has been a pleasure participating in Liz Funez's care.      CHIEF COMPLAINT: Right hip pain    HISTORY OF PRESENT ILLNESS:  The patient is seen in orthopedic consultation at the request of Dr. Flores.  The patient is a 88 year old male with a past medical history of atrial fibrillation on Coumadin, s/p left partial knee replacement , s/p Right TKA  both by [facility] who presented to the ED last night with Right hip pain after a fall. He states that he tripped on a cord in his home and fell onto his right hip. He was brought to the ED and xrays of his right hip were negative for fracture. A CT showed a nondisplaced Right acetabular fracture. He was admitted  for further evaluation. Currently, his right hip pain is controlled on oral medication. He denies any numbness or tingling in his bilateral lower extremities. He denies any pain in his hips prior to his fall. He lives alone in his home which is a splint entry. His neighbor has been helping him and taking care of his dog while his admitted. He uses a walker.     PAST MEDICAL HISTORY:  Past Medical History:   Diagnosis Date     Atrial fibrillation (H)      Bilateral chronic knee pain      CAD (coronary artery disease)      ALLERGIES:   Review of patient's allergies indicates No Known Allergies    MEDICATIONS UPON ADMISSION:  Medications were reviewed.  They include:   Medications Prior to Admission   Medication Sig Dispense Refill Last Dose     atorvastatin (LIPITOR) 20 MG tablet Take 20 mg by mouth daily   Unknown at Unknown time     bismuth subsalicylate (PEPTO BISMOL) 262 MG/15ML suspension Take 15 mLs by mouth every 6 hours as needed        diltiazem ER COATED BEADS (CARDIZEM CD/CARTIA XT) 120 MG 24 hr capsule Take 120 mg by mouth daily        docusate sodium (COLACE) 100 MG capsule Take 1 capsule (100 mg) by mouth 2 times daily   ~ 2 weeks ago     metoprolol tartrate (LOPRESSOR) 50 MG tablet Take 1 tablet (50 mg) by mouth 2 times daily   ~ 2 weeks ago     tamsulosin (FLOMAX) 0.4 MG capsule Take 1 capsule (0.4 mg) by mouth daily   ~ 2 weeks ago     travoprost BAK FREE (TRAVATAN Z) 0.004 % ophthalmic solution Place 1 drop into both eyes At Bedtime   9/18/2022 at doesn't have with     warfarin ANTICOAGULANT (COUMADIN) 5 MG tablet Take 5-7.5 mg by mouth daily 7.5mg on Fridays and 5mg the rest of the days of the week   9/19/2022 at Unknown time     SOCIAL HISTORY:   he  reports that he has quit smoking. He has a 10.00 pack-year smoking history. He has never used smokeless tobacco. He reports that he does not drink alcohol.    FAMILY HISTORY:  family history is not on file.    REVIEW OF SYSTEMS:   Reviewed with  patient. See HPI, otherwise negative     PHYSICAL EXAMINATION:  Vitals: Temp:  [97.7  F (36.5  C)-98.3  F (36.8  C)] 97.7  F (36.5  C)  Pulse:  [] 84  Resp:  [18-20] 20  BP: ()/(53-80) 104/53  SpO2:  [92 %-98 %] 96 %  General: On examination, the patient is resting comfortably, NAD, awake and alert and oriented to person, place and time, conversational  SKIN: Skin is intact and without lesions, rash or ecchymosis overlying Right hip and groin.  Pulses: Palpable bilateral dorsalis pedis pulses (L>R)  Sensation: Intact to light touch in L2-S1 dermatomes bilaterally.  Tenderness: Right hip, groin, low back without tenderness to palpation.  ROM: Moves all toes bilaterally. Flexes at right hip.  Motor: +5/5 left hip flexion, knee extension, ankle DF, PF, EHL bilaterally. Right hip strength not assessed due to fracture and pain.  Lower extremities: Bilateral thigh and calf compartments soft & non-tender.     RADIOGRAPHIC EVALUATION:  Personally reviewed    CT Right Hip:   1.  Acute nondisplaced right acetabular fracture. The fractures involve the medial acetabulum with extension superiorly along the medial ilium and anterior medial acetabulum.  2.  Small right hip joint effusion.  3.  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  4.  Osteopenia.    XR Pelvis: No fracture or dislocation. Osteopenia. Mild degenerative changes in both hips.    PERTINENT LABS:  Lab Results: personally reviewed.   No visits with results within 2 Month(s) from this visit.   Latest known visit with results is:   Lab Requisition on 07/25/2022   Component Date Value     INR 07/25/2022 2.25 (A)     Licha Gale PA-C  Date: 9/21/2022  Time: 12:29 PM    CC1:   Flavio Flores MD    CC2:   Luis Mcclellan

## 2022-09-21 NOTE — TREATMENT PLAN
Consult received and imaging reviewed. Patient has a nondisplaced acetabular fracture seen only on CT scan. This is predominantly a posterior column fracture. Recommend nonsurgical treatment. Patient may perform protected weight bearing with a walker. Follow up in 2-4 weeks with [facility], repeat xrays. Full consult to follow if admitted.    STEPHEN MARTINEZ MD

## 2022-09-21 NOTE — PLAN OF CARE
Hip fracture:    He presents with history of a fall at home resulting in trouble weight bearing on the right. This occurred Sunday .     Goal Outcome Evaluation:        Pt A & O. Denies pain with exception of movement. Reposition tilt to left side with pillows for comfort. CMS intact. Remaining system assessment WNL.

## 2022-09-21 NOTE — PLAN OF CARE
Hip fracture:    He presents with history of a fall at home resulting in trouble weight bearing on the right. This occurred Sunday.   Goal Outcome Evaluation:  Pt has slept at intervals. Right hip pain with movement. Given Tylenol & repositioning for pain control. VSS. CMS intact.  Voiding and taking PO fluids well.

## 2022-09-21 NOTE — H&P
[facility] MEDICINE ADMISSION HISTORY AND PHYSICAL     Assessment & Plan       Liz Shankar is a 88 year old old male significant for atrial fibrillation on anticoagulation, osteoarthritis of bilateral knees who presented to the hospital today 9/20/2022 after a mechanical fall.  He was found to have acute nondisplaced right acetabular fracture.  Orthopedic surgery were consulted and the plan for conservative management.    #Nondisplaced acetabular fracture of the right hip  -Patient reported a mechanical fall around a week ago where he landed on his right hip.  -Right hip x-ray was negative.  -CT showed acute nondisplaced right patellar fracture involving the medial acetabulum with extension superiorly along the medial ilium and anterior medial acetabulum.  Mild osteoarthritis of right hip and osteopenia.  -ED physician contacted orthopedic surgery.  They recommended nonsurgical treatment.  Initial plan for protected weightbearing with walker with follow-up in 2 to 4 weeks with [facility].    Plan:  [] Pain management with Tylenol for now.  Patient denies any significant pain.  [] Consult PT for discharge planning.  [] DVT prophylaxis with warfarin.  [] At this point the patient's fall is presumed due to mechanical etiology.  However, we cannot rule out a cardiac etiology.  Nevertheless, the patient confirmed that he did not lose consciousness.      #Atrial fibrillation  -Patient reported a history of atrial fibrillation.  -At home he is on metoprolol tartrate 50 mg twice daily and diltiazem 120 daily.  -Heart rate is currently around 110.  -Patient denies any chest pain shortness of breath.  -On warfarin.  -INR is subtherapeutic at around 1.5.    Plan:  [] Continue metoprolol tartrate 50 mg twice daily and diltiazem home dose.  [] Continue warfarin with target INR of 2-3. (Per ED surgery was okay with resuming anticoagulation.  [] Transthoracic echocardiogram for further  evaluation of systolic murmur to rule out any valvular abnormalities.    #Benign prostatic hyperplasia.  Plan:  [] Resume home dose tamsulosin.              DVTP: Warfarin   Code Status: Full Code  Disposition: Observation   Expected LOS: 2 days   Goals for the hospitalization: Pain control and safe discharge plans  Disposition Plan     -Awaiting physical therapy evaluation and  for safe discharge planning.     The patient's care was discussed with the Bedside Nurse.  Chief Complaint  Right hip pain     HISTORY     Liz Shankar is a 88 year old old male with past medical history significant for atrial fibrillation on anticoagulation, benign prostatic hyperplasia who presented to the hospital today 9/20/2022 with right hip pain after mechanical fall around a week ago.    Patient reported that he was in his usual state of health until around a week ago where he sustained a mechanical fall, tripping falling on the right hip.  Patient denies any palpitation, chest pain or loss of consciousness.    Since his fall around a week ago he has been experiencing right hip pain particularly with weightbearing.  He used over-the-counter pain medication such as acetaminophen.  His neighbor who is a nurse suggested that he present to the hospital for further evaluation.    Patient denies any vision changes, difficulty swallowing, chest pain, palpitation or shortness of breath, abdominal pain, constipation or diarrhea.    Past Medical History     Past Medical History:  No date: Atrial fibrillation (H)  No date: Bilateral chronic knee pain  No date: CAD (coronary artery disease)     Surgical History   History reviewed. No pertinent surgical history.  Family History    Reviewed, and History reviewed. No pertinent family history.     Social History      Social History     Tobacco Use     Smoking status: Former Smoker     Packs/day: 1.00     Years: 10.00     Pack years: 10.00     Smokeless tobacco: Never Used   Substance  Use Topics     Alcohol use: Never     Currently living alone at home.  He has a dog.    Allergies   No Known Allergies  Prior to Admission Medications      Prior to Admission Medications   Prescriptions Last Dose Informant Patient Reported? Taking?   atorvastatin (LIPITOR) 20 MG tablet Unknown at Unknown time  Yes Yes   Sig: Take 20 mg by mouth daily   bismuth subsalicylate (PEPTO BISMOL) 262 MG/15ML suspension   Yes Yes   Sig: Take 15 mLs by mouth every 6 hours as needed   diltiazem ER COATED BEADS (CARDIZEM CD/CARTIA XT) 120 MG 24 hr capsule   Yes Yes   Sig: Take 120 mg by mouth daily   docusate sodium (COLACE) 100 MG capsule ~ 2 weeks ago  No Yes   Sig: Take 1 capsule (100 mg) by mouth 2 times daily   metoprolol tartrate (LOPRESSOR) 50 MG tablet ~ 2 weeks ago  No Yes   Sig: Take 1 tablet (50 mg) by mouth 2 times daily   tamsulosin (FLOMAX) 0.4 MG capsule ~ 2 weeks ago  No Yes   Sig: Take 1 capsule (0.4 mg) by mouth daily   travoprost BAK FREE (TRAVATAN Z) 0.004 % ophthalmic solution 9/18/2022 at doesn't have with  Yes Yes   Sig: Place 1 drop into both eyes At Bedtime   warfarin ANTICOAGULANT (COUMADIN) 5 MG tablet 9/19/2022 at Unknown time  Yes Yes   Sig: Take 5-7.5 mg by mouth daily 7.5mg on Fridays and 5mg the rest of the days of the week      Facility-Administered Medications: None      Review of Systems     A 12 point comprehensive review of systems was negative except as noted above in HPI.    PHYSICAL EXAMINATION       Vitals      Temp:  [98.1  F (36.7  C)-98.3  F (36.8  C)] 98.1  F (36.7  C)  Pulse:  [110-124] 110  Resp:  [18-20] 18  BP: (108-144)/(60-80) 144/80  SpO2:  [92 %-98 %] 92 %    Examination     GENERAL:  Alert, appears comfortable, in no acute distress, appears stated age   HEAD:  Normocephalic, without obvious abnormality, atraumatic   THROAT: Lips, mucosa, and tongue normal; teeth and gums normal, mouth moist   LUNGS:   Clear to auscultation bilaterally, no rales, rhonchi, or wheezing,  symmetric chest rise on inhalation, respirations unlabored   CHEST WALL:  No tenderness or deformity   HEART:   Irregularly irregular rhythm.  Tachycardic.  Systolic murmur best heard on the aortic valve area.   ABDOMEN:   Soft, non-tender, bowel sounds active all four quadrants, no masses, no organomegaly, no rebound or guarding   EXTREMITIES:  Tenderness to palpation of the lateral side of the right hip.  Limited range of motion of right hip due to pain.   SKIN: Dry to touch.    NEURO:  Cranial nerves grossly intact.  Normal motor strength in bilateral upper extremities.  Unable to fully evaluate lower extremities.   PSYCH: Cooperative, behavior is appropriate      Pertinent Lab     Most Recent 3 CBC's:Recent Labs   Lab Test 09/20/22 2148 07/18/22  1727 07/10/22  0641   WBC 11.6* 5.3 9.0   HGB 14.0 12.8* 14.4   MCV 98 99 97    228 210     Most Recent 3 BMP's:Recent Labs   Lab Test 09/20/22  1543 07/18/22  1727 07/10/22  0641    137 136   POTASSIUM 3.8 4.3 3.9   CHLORIDE 106 107 107   CO2 22 26 24   BUN 21 14 16   CR 0.59* 0.74 0.70   ANIONGAP 11 4* 5   MARBIN 8.7 8.3* 7.6*    111 93     Most Recent 3 INR's:Recent Labs   Lab Test 09/20/22  1543 07/25/22  0652 07/21/22  0828   INR 1.56* 2.25* 1.58*     Most Recent 3 Hemoglobins:Recent Labs   Lab Test 09/20/22 2148 07/18/22  1727 07/10/22  0641   HGB 14.0 12.8* 14.4         Pertinent Radiology     Radiology Results:   Recent Results (from the past 24 hour(s))   XR Pelvis and Hip Right 2 Views    Narrative    EXAM: XR PELVIS AND HIP RIGHT 2 VIEWS  LOCATION: [facility]  DATE/TIME: 9/20/2022 5:13 PM    INDICATION: Right hip pain after fall  COMPARISON: None.      Impression    IMPRESSION: No fracture or dislocation. Osteopenia. Mild degenerative changes in both hips.   CT Hip Right w/o Contrast    Narrative    EXAM: CT HIP RIGHT W/O CONTRAST  LOCATION: [facility]  DATE/TIME: 9/20/2022 6:40  PM    INDICATION: Acute right hip pain following fall with negative x-ray.  COMPARISON: 09/20/2022 radiographs.  TECHNIQUE: Noncontrast. Axial, sagittal and coronal thin-section reconstruction. Dose reduction techniques were used.     FINDINGS:     BONES:  -Osteopenia. Occult nondisplaced right acetabular fracture with extension into the left ilium medially. There is a nondisplaced vertical fracture involving the medial wall of the acetabulum extending into the superior acetabulum and medial aspect of the   right ilium along the superior margin of the field-of-view. There is also a nondisplaced fracture involving the anterior and medial acetabulum best identified on the sagittal view. No pubic rami fractures are evident. Small right hip joint effusion. Mild   joint space narrowing, hypertrophic change and subchondral cystic change in the right hip joint. No osseous lesion. No evidence for avascular necrosis.    SOFT TISSUES:  -No subcutaneous fluid collection or hematoma. No retracted tendon tear. No free fluid in the pelvis.      Impression    IMPRESSION:  1.  Acute nondisplaced right acetabular fracture. The fractures involve the medial acetabulum with extension superiorly along the medial ilium and anterior medial acetabulum.  2.  Small right hip joint effusion.  3.  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  4.  Osteopenia.     CT Head w/o Contrast    Narrative    EXAM: CT HEAD W/O CONTRAST  LOCATION: [facility]  DATE/TIME: 9/20/2022 9:07 PM    INDICATION: Confusion, fall, on Coumadin  COMPARISON:  CT head 07/20/2022.  TECHNIQUE: Routine CT Head without IV contrast. Multiplanar reformats. Dose reduction techniques were used.    FINDINGS:  INTRACRANIAL CONTENTS: No intracranial hemorrhage, extraaxial collection, or mass effect.  No CT evidence of acute infarct. Mild presumed chronic small vessel ischemic changes. Moderate generalized volume loss. No hydrocephalus.     VISUALIZED  ORBITS/SINUSES/MASTOIDS: No intraorbital abnormality. No paranasal sinus mucosal disease. No middle ear or mastoid effusion.    BONES/SOFT TISSUES: No acute abnormality.      Impression    IMPRESSION:  1.  No acute intracranial process or significant change since 07/08/2022.     EKG Results: personally reviewed. Showed atrial fibrillation with RVR.    Advance Care Planning        ANAYELI ELLIS MD  [facility]   Phone: #[phone]

## 2022-09-21 NOTE — ED NOTES
Call made to Bairon Friedman, power of attourney and brother. Updated that would stay here and gave the room number. [phone].

## 2022-09-21 NOTE — PROGRESS NOTES
[facility] MEDICINE PROGRESS NOTE      Identification/Summary: Liz Shankar is a 88 year old old male significant for atrial fibrillation on anticoagulation, osteoarthritis of bilateral knees who presented to the hospital on 9/20/2022 after a mechanical fall. He was found to have acute nondisplaced right acetabular hip fracture. Orthopedic surgery were consulted and the plan for conservative management.     Assessment and Plan:     #Nondisplaced acetabular fracture of the right hip  -Patient reported a mechanical fall around a week ago where he landed on his right hip.  -Right hip x-ray was negative.  -CT showed acute nondisplaced right patellar fracture involving the medial acetabulum with extension superiorly along the medial ilium and anterior medial acetabulum.  Mild osteoarthritis of right hip and osteopenia.  -ED physician contacted orthopedic surgery.  They recommended nonsurgical treatment.  Initial plan for protected weightbearing with walker with follow-up in 2 to 4 weeks with [facility].  -Patient continues to have some pain in his right hip particularly with position changes.    Plan:  [] Pain management with Tylenol, hydrocodone/acetaminophen, Dilaudid if needed.  [] Physical therapy evaluated the patient and recommended discharge to TCU, long-term care facility, home with home PT.  [] Continue DVT prophylaxis with warfarin.          #Atrial fibrillation  -Patient reported a history of atrial fibrillation.  -At home he is on metoprolol tartrate 50 mg twice daily and diltiazem 120 daily.  -Heart rate is currently around 110.  -Patient denies any chest pain shortness of breath.  -On warfarin.  -I warfarin was resumed on admission.  INR remains subtherapeutic at 1.6.  -Heart rate is around 85.  Blood pressure is around 100/50.     Plan:  [] Continue to monitor blood pressure  [] Continue metoprolol tartrate 50 mg twice daily.   [] Continue diltiazem 120 mg daily.   and  diltiazem home dose.  [] Continue warfarin with target INR of 2-3. (Per ED surgery was okay with resuming anticoagulation.  [] Follow-up with Transthoracic echocardiogram for further evaluation of systolic murmur to rule out any valvular abnormalities.  [] Continue to monitor hemoglobin.     #Benign prostatic hyperplasia.  Plan:  [] Resume home dose tamsulosin.                 Diet: Regular Diet Adult  DVT Prophylaxis:  Warfarin  Code Status: Full Code    Anticipated possible discharge in 1 days to 2 once echocardiogram completed, patient pain is well controlled milestones are met.  Disposition Plan  TCU versus home with home health     Expected Discharge Date: 09/23/2022        Discharge Comments: pending clinical progression        The patient's care was discussed with the Bedside Nurse.    ANAYELI ELLIS MD  [facility]  Phone: #[phone]    Interval History/Subjective:  Continues to experience mild pain on the right hip.  He was unable to sleep well yesterday due to bright environment.  Tolerating diet well.  He is interested  in being discharged to TCU.    Physical Exam/Objective:  Temp:  [97.7  F (36.5  C)-98.3  F (36.8  C)] 97.7  F (36.5  C)  Pulse:  [] 84  Resp:  [18-20] 20  BP: ()/(53-80) 104/53  SpO2:  [92 %-98 %] 96 %  Body mass index is 23.56 kg/m .      GENERAL:  Alert, appears comfortable, in no acute distress, appears stated age   LUNGS:   Clear to auscultation bilaterally, no rales, rhonchi, or wheezing, symmetric chest rise on inhalation, respirations unlabored   HEART:   rregularly irregular rhythm. Tachycardic. Systolic murmur best heard on the aortic valve area.   ABDOMEN:   Soft, non-tender, bowel sounds active all four quadrants, no masses, no organomegaly, no rebound or guarding   EXTREMITIES:  Tenderness to palpation of the lateral side of the right hip. Limited range of motion of right hip due to pain.   SKIN: Dry to  touch.    NEURO:  Cranial nerves grossly intact. Normal motor strength in bilateral upper extremities. Unable to fully evaluate lower extremities.   PSYCH: Cooperative, behavior is appropriate         Data reviewed today: I personally reviewed all new medications, labs, imaging/diagnostics reports over the past 24 hours. Pertinent findings include:   -Hemoglobin slightly dropped from 14 to around 12.5.  No leukocytosis.  -INR remains subtherapeutic at 1.6.    Imaging:   Recent Results (from the past 24 hour(s))   XR Pelvis and Hip Right 2 Views    Narrative    EXAM: XR PELVIS AND HIP RIGHT 2 VIEWS  LOCATION: [facility]  DATE/TIME: 9/20/2022 5:13 PM    INDICATION: Right hip pain after fall  COMPARISON: None.      Impression    IMPRESSION: No fracture or dislocation. Osteopenia. Mild degenerative changes in both hips.   CT Hip Right w/o Contrast    Narrative    EXAM: CT HIP RIGHT W/O CONTRAST  LOCATION: [facility]  DATE/TIME: 9/20/2022 6:40 PM    INDICATION: Acute right hip pain following fall with negative x-ray.  COMPARISON: 09/20/2022 radiographs.  TECHNIQUE: Noncontrast. Axial, sagittal and coronal thin-section reconstruction. Dose reduction techniques were used.     FINDINGS:     BONES:  -Osteopenia. Occult nondisplaced right acetabular fracture with extension into the left ilium medially. There is a nondisplaced vertical fracture involving the medial wall of the acetabulum extending into the superior acetabulum and medial aspect of the   right ilium along the superior margin of the field-of-view. There is also a nondisplaced fracture involving the anterior and medial acetabulum best identified on the sagittal view. No pubic rami fractures are evident. Small right hip joint effusion. Mild   joint space narrowing, hypertrophic change and subchondral cystic change in the right hip joint. No osseous lesion. No evidence for avascular necrosis.    SOFT TISSUES:  -No  subcutaneous fluid collection or hematoma. No retracted tendon tear. No free fluid in the pelvis.      Impression    IMPRESSION:  1.  Acute nondisplaced right acetabular fracture. The fractures involve the medial acetabulum with extension superiorly along the medial ilium and anterior medial acetabulum.  2.  Small right hip joint effusion.  3.  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  4.  Osteopenia.     CT Head w/o Contrast    Narrative    EXAM: CT HEAD W/O CONTRAST  LOCATION: [facility]  DATE/TIME: 9/20/2022 9:07 PM    INDICATION: Confusion, fall, on Coumadin  COMPARISON:  CT head 07/20/2022.  TECHNIQUE: Routine CT Head without IV contrast. Multiplanar reformats. Dose reduction techniques were used.    FINDINGS:  INTRACRANIAL CONTENTS: No intracranial hemorrhage, extraaxial collection, or mass effect.  No CT evidence of acute infarct. Mild presumed chronic small vessel ischemic changes. Moderate generalized volume loss. No hydrocephalus.     VISUALIZED ORBITS/SINUSES/MASTOIDS: No intraorbital abnormality. No paranasal sinus mucosal disease. No middle ear or mastoid effusion.    BONES/SOFT TISSUES: No acute abnormality.      Impression    IMPRESSION:  1.  No acute intracranial process or significant change since 07/08/2022.   US Lower Extremity Venous Duplex Left    Narrative    EXAM: US LOWER EXTREMITY VENOUS DUPLEX LEFT  LOCATION: [facility]  DATE/TIME: 9/21/2022 7:28 AM    INDICATION: left leg swelling pain, fell and has pelvic fracture  COMPARISON: None.  TECHNIQUE: Venous Duplex ultrasound of the left lower extremity with and without compression, augmentation and duplex. Color flow and spectral Doppler with waveform analysis performed.    FINDINGS: Exam includes the common femoral, femoral, popliteal, and contralateral common femoral veins as well as segmentally visualized deep calf veins and greater saphenous vein.     LEFT: No deep vein thrombosis. No  superficial thrombophlebitis. No popliteal cyst.      Impression    IMPRESSION:  1.  No deep venous thrombosis in the left leg.       Labs:  Most Recent 3 CBC's:Recent Labs   Lab Test 09/21/22  0431 09/20/22  2148 07/18/22  1727   WBC 9.8 11.6* 5.3   HGB 12.3* 14.0 12.8*   MCV 99 98 99    168 228     Most Recent 3 BMP's:Recent Labs   Lab Test 09/20/22  1543 07/18/22  1727 07/10/22  0641    137 136   POTASSIUM 3.8 4.3 3.9   CHLORIDE 106 107 107   CO2 22 26 24   BUN 21 14 16   CR 0.59* 0.74 0.70   ANIONGAP 11 4* 5   MARBIN 8.7 8.3* 7.6*    111 93     Most Recent 3 INR's:Recent Labs   Lab Test 09/21/22  0431 09/20/22  1543 07/25/22  0652   INR 1.62* 1.56* 2.25*       Medications:   Personally Reviewed.  Medications       atorvastatin  20 mg Oral Daily     diltiazem ER COATED BEADS  120 mg Oral Daily     docusate sodium  100 mg Oral BID     metoprolol tartrate  50 mg Oral BID     tamsulosin  0.4 mg Oral Daily     travoprost NURY FREE  1 drop Both Eyes At Bedtime     warfarin ANTICOAGULANT  7.5 mg Oral ONCE at 18:00     Warfarin Therapy Reminder  1 each Oral See Admin Instructions

## 2022-09-21 NOTE — UTILIZATION REVIEW
Concurrent stay review; Secondary Review Determination     Under the authority of the Utilization Management Committee, the utilization review process indicated a secondary review on Liz Shankar.  The review outcome is based on review of the medical records, discussions with staff, and applying clinical experience noted on the date of the review.        (x) Observation Status Appropriate - Concurrent stay review    RATIONALE FOR DETERMINATION   88-year-old male admitted with nonoperative acetabular fracture of right hip.  Orthopedics consulted, will follow up as outpatient.  Pain managed with oral medication.  Echocardiogram ordered, still awaiting results.  History of atrial fibrillation and BPH.  Vitals have been stable.    Patient is clinically improving and there is no clear indication to change patient's status to inpatient. The severity of illness, intensity of service provided, expected LOS and risk for adverse outcome make the care appropriate for observation.    The information on this document is developed by the utilization review team in order for the business office to ensure compliance.  This only denotes the appropriateness of proper admission status and does not reflect the quality of care rendered.         The definitions of Inpatient Status and Observation Status used in making the determination above are those provided in the CMS Coverage Manual, Chapter 1 and Chapter 6, section 70.4.      Sincerely,   Tirso Coles MD  Utilization Review  Physician Advisor  [facility]

## 2022-09-22 ENCOUNTER — LAB REQUISITION (OUTPATIENT)
Dept: LAB | Facility: CLINIC | Age: 87
End: 2022-09-22
Payer: COMMERCIAL

## 2022-09-22 ENCOUNTER — APPOINTMENT (OUTPATIENT)
Dept: PHYSICAL THERAPY | Facility: CLINIC | Age: 87
End: 2022-09-22
Payer: COMMERCIAL

## 2022-09-22 VITALS
TEMPERATURE: 97.4 F | OXYGEN SATURATION: 97 % | RESPIRATION RATE: 18 BRPM | WEIGHT: 181.5 LBS | HEIGHT: 71 IN | SYSTOLIC BLOOD PRESSURE: 115 MMHG | HEART RATE: 78 BPM | BODY MASS INDEX: 25.41 KG/M2 | DIASTOLIC BLOOD PRESSURE: 58 MMHG

## 2022-09-22 DIAGNOSIS — I48.0 PAROXYSMAL ATRIAL FIBRILLATION (H): ICD-10-CM

## 2022-09-22 LAB
ANION GAP SERPL CALCULATED.3IONS-SCNC: 7 MMOL/L (ref 5–18)
BASOPHILS # BLD AUTO: 0 10E3/UL (ref 0–0.2)
BASOPHILS NFR BLD AUTO: 1 %
BUN SERPL-MCNC: 20 MG/DL (ref 8–28)
CALCIUM SERPL-MCNC: 8.3 MG/DL (ref 8.5–10.5)
CHLORIDE BLD-SCNC: 100 MMOL/L (ref 98–107)
CO2 SERPL-SCNC: 26 MMOL/L (ref 22–31)
CREAT SERPL-MCNC: 0.66 MG/DL (ref 0.7–1.3)
EOSINOPHIL # BLD AUTO: 0.3 10E3/UL (ref 0–0.7)
EOSINOPHIL NFR BLD AUTO: 4 %
ERYTHROCYTE [DISTWIDTH] IN BLOOD BY AUTOMATED COUNT: 13.5 % (ref 10–15)
GFR SERPL CREATININE-BSD FRML MDRD: 90 ML/MIN/1.73M2
GLUCOSE BLD-MCNC: 141 MG/DL (ref 70–125)
HCT VFR BLD AUTO: 39 % (ref 40–53)
HGB BLD-MCNC: 12.8 G/DL (ref 13.3–17.7)
IMM GRANULOCYTES # BLD: 0 10E3/UL
IMM GRANULOCYTES NFR BLD: 1 %
INR PPP: 1.78 (ref 0.85–1.15)
LYMPHOCYTES # BLD AUTO: 0.7 10E3/UL (ref 0.8–5.3)
LYMPHOCYTES NFR BLD AUTO: 8 %
MCH RBC QN AUTO: 32.3 PG (ref 26.5–33)
MCHC RBC AUTO-ENTMCNC: 32.8 G/DL (ref 31.5–36.5)
MCV RBC AUTO: 99 FL (ref 78–100)
MONOCYTES # BLD AUTO: 0.7 10E3/UL (ref 0–1.3)
MONOCYTES NFR BLD AUTO: 8 %
NEUTROPHILS # BLD AUTO: 7 10E3/UL (ref 1.6–8.3)
NEUTROPHILS NFR BLD AUTO: 78 %
NRBC # BLD AUTO: 0 10E3/UL
NRBC BLD AUTO-RTO: 0 /100
PLATELET # BLD AUTO: 158 10E3/UL (ref 150–450)
POTASSIUM BLD-SCNC: 4.2 MMOL/L (ref 3.5–5)
RBC # BLD AUTO: 3.96 10E6/UL (ref 4.4–5.9)
SODIUM SERPL-SCNC: 133 MMOL/L (ref 136–145)
WBC # BLD AUTO: 8.8 10E3/UL (ref 4–11)

## 2022-09-22 PROCEDURE — 99217 PR OBSERVATION CARE DISCHARGE: CPT | Performed by: STUDENT IN AN ORGANIZED HEALTH CARE EDUCATION/TRAINING PROGRAM

## 2022-09-22 PROCEDURE — 80048 BASIC METABOLIC PNL TOTAL CA: CPT | Performed by: STUDENT IN AN ORGANIZED HEALTH CARE EDUCATION/TRAINING PROGRAM

## 2022-09-22 PROCEDURE — U0003 INFECTIOUS AGENT DETECTION BY NUCLEIC ACID (DNA OR RNA); SEVERE ACUTE RESPIRATORY SYNDROME CORONAVIRUS 2 (SARS-COV-2) (CORONAVIRUS DISEASE [COVID-19]), AMPLIFIED PROBE TECHNIQUE, MAKING USE OF HIGH THROUGHPUT TECHNOLOGIES AS DESCRIBED BY CMS-2020-01-R: HCPCS | Performed by: INTERNAL MEDICINE

## 2022-09-22 PROCEDURE — 250N000013 HC RX MED GY IP 250 OP 250 PS 637: Performed by: INTERNAL MEDICINE

## 2022-09-22 PROCEDURE — 250N000013 HC RX MED GY IP 250 OP 250 PS 637: Performed by: STUDENT IN AN ORGANIZED HEALTH CARE EDUCATION/TRAINING PROGRAM

## 2022-09-22 PROCEDURE — 250N000013 HC RX MED GY IP 250 OP 250 PS 637: Performed by: HOSPITALIST

## 2022-09-22 PROCEDURE — 36415 COLL VENOUS BLD VENIPUNCTURE: CPT | Performed by: STUDENT IN AN ORGANIZED HEALTH CARE EDUCATION/TRAINING PROGRAM

## 2022-09-22 PROCEDURE — 97116 GAIT TRAINING THERAPY: CPT | Mod: GP

## 2022-09-22 PROCEDURE — G0378 HOSPITAL OBSERVATION PER HR: HCPCS

## 2022-09-22 PROCEDURE — 85610 PROTHROMBIN TIME: CPT | Performed by: STUDENT IN AN ORGANIZED HEALTH CARE EDUCATION/TRAINING PROGRAM

## 2022-09-22 PROCEDURE — 85025 COMPLETE CBC W/AUTO DIFF WBC: CPT | Performed by: STUDENT IN AN ORGANIZED HEALTH CARE EDUCATION/TRAINING PROGRAM

## 2022-09-22 RX ORDER — ACETAMINOPHEN 325 MG/1
650 TABLET ORAL EVERY 4 HOURS PRN
Status: ON HOLD | DISCHARGE
Start: 2022-09-22 | End: 2024-01-01

## 2022-09-22 RX ORDER — NALOXONE HYDROCHLORIDE 0.4 MG/ML
0.2 INJECTION, SOLUTION INTRAMUSCULAR; INTRAVENOUS; SUBCUTANEOUS ONCE
DISCHARGE
Start: 2022-09-22 | End: 2022-12-20

## 2022-09-22 RX ORDER — HYDROCODONE BITARTRATE AND ACETAMINOPHEN 5; 325 MG/1; MG/1
1 TABLET ORAL EVERY 4 HOURS PRN
Qty: 5 TABLET | Refills: 0 | Status: SHIPPED | OUTPATIENT
Start: 2022-09-22 | End: 2022-09-25

## 2022-09-22 RX ORDER — WARFARIN SODIUM 7.5 MG/1
7.5 TABLET ORAL
Status: DISCONTINUED | OUTPATIENT
Start: 2022-09-22 | End: 2022-09-22 | Stop reason: HOSPADM

## 2022-09-22 RX ADMIN — HYDROCODONE BITARTRATE AND ACETAMINOPHEN 1 TABLET: 5; 325 TABLET ORAL at 12:11

## 2022-09-22 RX ADMIN — ATORVASTATIN CALCIUM 20 MG: 10 TABLET, FILM COATED ORAL at 09:06

## 2022-09-22 RX ADMIN — HYDROCODONE BITARTRATE AND ACETAMINOPHEN 1 TABLET: 5; 325 TABLET ORAL at 04:55

## 2022-09-22 RX ADMIN — DILTIAZEM HYDROCHLORIDE 120 MG: 120 CAPSULE, COATED, EXTENDED RELEASE ORAL at 09:06

## 2022-09-22 RX ADMIN — TAMSULOSIN HYDROCHLORIDE 0.4 MG: 0.4 CAPSULE ORAL at 09:06

## 2022-09-22 RX ADMIN — DOCUSATE SODIUM 100 MG: 100 CAPSULE, LIQUID FILLED ORAL at 09:06

## 2022-09-22 RX ADMIN — METOPROLOL TARTRATE 50 MG: 50 TABLET, FILM COATED ORAL at 09:06

## 2022-09-22 RX ADMIN — BENZOCAINE AND MENTHOL 1 LOZENGE: 15; 3.6 LOZENGE ORAL at 04:56

## 2022-09-22 ASSESSMENT — ACTIVITIES OF DAILY LIVING (ADL)
ADLS_ACUITY_SCORE: 36
ADLS_ACUITY_SCORE: 32
ADLS_ACUITY_SCORE: 36
ADLS_ACUITY_SCORE: 32

## 2022-09-22 NOTE — PLAN OF CARE
PRIMARY DIAGNOSIS: ACUTE PAIN  OUTPATIENT/OBSERVATION GOALS TO BE MET BEFORE DISCHARGE:  1. Pain Status: Patient C/O right hip pain. Pain controlled with PRN [place]. Utilized cold pack and essential oils for comfort.    2. Return to near baseline physical activity: No. 50% or less weight bearing to RLE with a walker.     3. Cleared for discharge by consultants (if involved): No    Discharge Planner Nurse   Safe discharge environment identified: No  Barriers to discharge:  Pending discharge plan       Entered by: Rasheeda Jane RN 09/22/2022 6:54 AM   Goal Outcome Evaluation:  Patient transferred to room 358 at 0400 from 3N via wheelchair. Pivot transferred to bed with A2. patient is alert and oriented. Can be confused and forgetful at times. C/O right hip pain. PRN Norco administered for pain control. Utilized ice pack for pain control. Discharge plan pending medical clearance.

## 2022-09-22 NOTE — PLAN OF CARE
Hip fracture:    He presents with history of a fall at home resulting in trouble weight bearing on the right. This occurred Sunday.   Goal Outcome Evaluation:  Pt awake to urinate. Currently denies pain. VSS. Forgetful in conversation and some anxiety noted. Denies N/T. CMS intact. Bed alarm on for safety.

## 2022-09-22 NOTE — PROGRESS NOTES
[facility]      OUTPATIENT PHYSICAL THERAPY EVALUATION  PLAN OF TREATMENT FOR OUTPATIENT REHABILITATION  (COMPLETE FOR INITIAL CLAIMS ONLY)  Patient's Last Name, First Name, M.I.  YOB: 1934  ShankarLiz  TR                        Provider's Name  [facility] Medical Record No.  [id number]                               Onset Date:  09/20/22   Start of Care Date:  09/20/22      Type:     _X_PT   ___OT   ___SLP Medical Diagnosis:  Hip fracture                        PT Diagnosis:  Impaired functional mobility   Visits from SOC:  1   _________________________________________________________________________________  Plan of Treatment/Functional Goals    Planned Interventions: gait training, home exercise program, patient/family education, ROM (range of motion), stair training, strengthening     Goals: See Physical Therapy Goals on Care Plan in [facility] electronic health record.    Therapy Frequency: Daily  Predicted Duration of Therapy Intervention: 09/26/22  _________________________________________________________________________________    I CERTIFY THE NEED FOR THESE SERVICES FURNISHED UNDER        THIS PLAN OF TREATMENT AND WHILE UNDER MY CARE     (Physician co-signature of this document indicates review and certification of the therapy plan).              Certification date from: 09/20/22, Certification date to: 10/20/22    Referring Physician: Dr. Navarro            Initial Assessment        See Physical Therapy evaluation dated 09/20/22 in Epic electronic health record.

## 2022-09-22 NOTE — PLAN OF CARE
Problem: Functional Ability Impaired (Hip Fracture Medical Management)  Goal: Optimal Functional Performance  Outcome: Ongoing, Progressing   Pt ambulating in room with walker and steady gait.   Problem: Pain (Hip Fracture Medical Management)  Goal: Acceptable Pain Level  Outcome: Ongoing, Progressing  Intervention: Manage Acute Orthopaedic-Related Pain  Recent Flowsheet Documentation  Taken 9/21/2022 2207 by Sally Singh, RN  Pain Management Interventions: medication (see MAR)  Taken 9/21/2022 1743 by Sally Singh, RN  Pain Management Interventions: medication (see MAR)  Pt having pain to right hip especially with activity. PRN PO Norco given x 2 and effective. Bed alarm on for safety. Hourly safety checks done.

## 2022-09-22 NOTE — PROGRESS NOTES
Care Management Discharge Note    Discharge Date: 09/22/2022       Discharge Disposition:  [facility] TCU    Discharge Services:  TCU    Discharge DME:  none    Discharge Transportation:  [facility] WC at 1pm     Private pay costs discussed: transportation costs    PAS Confirmation Code:  ([id number])  Patient/family educated on Medicare website which has current facility and service quality ratings:      Education Provided on the Discharge Plan:    Persons Notified of Discharge Plans: pt, friend Porsche, TCU  Patient/Family in Agreement with the Plan:      Handoff Referral Completed: Yes    Additional Information:  11:51 AM  Pt medically ready to discharge to TCU.  Accepted at [facility] TCU.  Only has 4 days left of full coverage and then $100 p/day (pt agreeable to pay this).  PAS done.  YVONNE scheduled [facility] WC at 1pm.  Pt agrees to any potential cost.  YVONNE updated TCU, bedside RN and hospitalist.        LIAM Cagle

## 2022-09-22 NOTE — PLAN OF CARE
Physical Therapy Discharge Summary    Reason for therapy discharge:    Discharged to transitional care facility.    Progress towards therapy goal(s). See goals on Care Plan in [facility] electronic health record for goal details.  Goals not met.  Barriers to achieving goals:   discharge from facility.    Therapy recommendation(s):    Continued therapy is recommended.  Rationale/Recommendations:  continued PT at TCU to improve functional mobility.  Continue home exercise program.

## 2022-09-22 NOTE — DISCHARGE SUMMARY
[facility] MEDICINE  DISCHARGE SUMMARY     Primary Care Physician: Luis Mcclellan  Admission Date: 9/20/2022   Discharge Provider: ANAYELI ELLIS MD Discharge Date: 9/22/2022   Diet:   Active Diet and Nourishment Order   Procedures     Diet       Code Status: Prior   Activity: Protected weightbearing on right hip.        Condition at Discharge: Stable     REASON FOR PRESENTATION(See Admission Note for Details)   Right hip pain after mechanical fall.    PRINCIPAL & ACTIVE DISCHARGE DIAGNOSES     Active Problems:    Fall, initial encounter    Closed nondisplaced fracture of medial wall of right acetabulum, initial encounter (H)      PENDING LABS     Unresulted Labs Ordered in the Past 30 Days of this Admission     No orders found from 8/21/2022 to 9/21/2022.            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        DISPOSITION     Skilled Nursing Facility    SUMMARY OF HOSPITAL COURSE:    88 years old man with past medical history significant for atrial fibrillation on anticoagulation, bilateral knee osteoarthritis who presented after mechanical fall a week prior to hospitalization.  CT lower extremity showed acute nondisplaced right patellar fracture involving the medial acetabulum with extension superiorly along the medial ilium and anterior medial acetabulum.  Mild osteoarthritis of right hip and osteopenia.  Orthopedic surgery evaluated the patient and they recommended nonsurgical treatment with protected weightbearing with a walker.  Follow-up [facility] in 2 to 4 weeks.  Physical therapy recommended discharge to TCU.    #Nondisplaced acetabular fracture of the right hip  -Patient continues to have some pain in his right hip particularly with position  changes.    Plan:  [] Pain management with Tylenol,  Norco if needed  [] Follow-up with [facility] in 2 to 4 weeks.  [] Protected weightbearing at TCU.      #Atrial fibrillation  -Patient reported a history of atrial fibrillation.  -At home he is on metoprolol tartrate 50 mg twice daily and diltiazem 120 daily.  -Rate controlled.  -On warfarin.  - warfarin was resumed on admission.  INR remains subtherapeutic at 1.78.       Plan:  [] Continue home dose warfarin.  [] Continue to check INR at TCU.  Target INR 2-3.  [] Continue to monitor blood pressure  [] Continue metoprolol tartrate 50 mg twice daily.   [] Continue diltiazem 120 mg daily.     #Benign prostatic hyperplasia.  Plan:  [] Resume home dose tamsulosin.    #Newly diagnosed heart failure with reduced ejection fraction.  -Given the patient reported fall, systolic murmur physical examination.  -Echo performed which showed ejection fraction around 40 to 45%, severely dilated left atrium, moderate dilated right atrium, moderate to severe low-flow low gradient aortic stenosis.  -Patient appears euvolemic on physical examination.    Plan:  [] Patient should follow-up with cardiology as an outpatient for further evaluation of cardiomyopathy and aortic stenosis.    Discharge Medications with Med changes:     Discharge Medication List as of 9/22/2022 12:54 PM      START taking these medications    Details   acetaminophen (TYLENOL) 325 MG tablet Take 2 tablets (650 mg) by mouth every 4 hours as needed for mild pain, other, fever or headaches (and adjunct with moderate or severe pain or per patient request), Transitional      HYDROcodone-acetaminophen (NORCO) 5-325 MG tablet Take 1 tablet by mouth every 4 hours as needed for moderate to severe pain, Disp-5 tablet, R-0, Local Print      melatonin 1 MG TABS tablet Take 1 tablet (1 mg) by mouth nightly as needed for sleep, Transitional      naloxone (NARCAN) 0.4 MG/ML injection Inject 0.5 mLs (0.2 mg) into the vein  once for 1 dose, Transitional         CONTINUE these medications which have NOT CHANGED    Details   atorvastatin (LIPITOR) 20 MG tablet Take 20 mg by mouth daily, Historical      bismuth subsalicylate (PEPTO BISMOL) 262 MG/15ML suspension Take 15 mLs by mouth every 6 hours as needed, Historical      diltiazem ER COATED BEADS (CARDIZEM CD/CARTIA XT) 120 MG 24 hr capsule Take 120 mg by mouth daily, Historical      docusate sodium (COLACE) 100 MG capsule Take 1 capsule (100 mg) by mouth 2 times daily, Transitional      metoprolol tartrate (LOPRESSOR) 50 MG tablet Take 1 tablet (50 mg) by mouth 2 times daily, TransitionalHold for SBP < 100 or DBP < 60 or HR < 50      tamsulosin (FLOMAX) 0.4 MG capsule Take 1 capsule (0.4 mg) by mouth daily, Transitional      travoprost BAK FREE (TRAVATAN Z) 0.004 % ophthalmic solution Place 1 drop into both eyes At Bedtime, Historical      warfarin ANTICOAGULANT (COUMADIN) 5 MG tablet Take 5-7.5 mg by mouth daily 7.5mg on Fridays and 5mg the rest of the days of the week, Historical                       Consults   Orthopedic    CARE MANAGEMENT / SOCIAL WORK IP CONSULT  PHYSICAL THERAPY ADULT IP CONSULT  PHARMACY TO DOSE WARFARIN  PHYSICAL THERAPY ADULT IP CONSULT  OCCUPATIONAL THERAPY ADULT IP CONSULT    Immunizations given this encounter     Most Recent Immunizations   Administered Date(s) Administered     COVID-19,PF,Pfizer 12+ Yrs (2022 and After) 05/02/2022     DT (PEDS <7y) 12/20/2004     Pneumococcal 23 valent 12/13/2002     Td,adult,historic,unspecified 12/20/2004           Anticoagulation Information      Recent INR results:   Recent Labs   Lab 09/22/22  0719 09/21/22  0431 09/20/22  1543   INR 1.78* 1.62* 1.56*           SIGNIFICANT IMAGING FINDINGS     Results for orders placed or performed during the hospital encounter of 09/20/22   XR Pelvis and Hip Right 2 Views    Impression    IMPRESSION: No fracture or dislocation. Osteopenia. Mild degenerative changes in both hips.    CT Hip Right w/o Contrast    Impression    IMPRESSION:  1.  Acute nondisplaced right acetabular fracture. The fractures involve the medial acetabulum with extension superiorly along the medial ilium and anterior medial acetabulum.  2.  Small right hip joint effusion.  3.  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  4.  Osteopenia.     CT Head w/o Contrast    Impression    IMPRESSION:  1.  No acute intracranial process or significant change since 07/08/2022.   US Lower Extremity Venous Duplex Left    Impression    IMPRESSION:  1.  No deep venous thrombosis in the left leg.   Echocardiogram Complete   Result Value Ref Range    LVEF  40-45%        SIGNIFICANT LABORATORY FINDINGS     Most Recent 3 CBC's:Recent Labs   Lab Test 09/22/22  0719 09/21/22  0431 09/20/22  2148   WBC 8.8 9.8 11.6*   HGB 12.8* 12.3* 14.0   MCV 99 99 98    167 168     Most Recent 3 BMP's:Recent Labs   Lab Test 09/22/22  0719 09/20/22  1543 07/18/22  1727   * 139 137   POTASSIUM 4.2 3.8 4.3   CHLORIDE 100 106 107   CO2 26 22 26   BUN 20 21 14   CR 0.66* 0.59* 0.74   ANIONGAP 7 11 4*   MARBIN 8.3* 8.7 8.3*   * 109 111     Most Recent 3 INR's:Recent Labs   Lab Test 09/22/22  0719 09/21/22  0431 09/20/22  1543   INR 1.78* 1.62* 1.56*           Discharge Orders        General info for SNF    Length of Stay Estimate: Short Term Care: Estimated # of Days <30  Condition at Discharge: Stable  Level of care:skilled   Rehabilitation Potential: Good  Admission H&P remains valid and up-to-date: Yes  Recent Chemotherapy: N/A  Use Nursing Home Standing Orders: Yes     Mantoux instructions    Give two-step Mantoux (PPD) Per Facility Policy Yes     Reason for your hospital stay    Right hip fracture.     Glucose monitor nursing POCT    Before meals and at bedtime     Bladder scan    X 2 for post void residual     Follow Up and recommended labs and tests    Follow up with primary care provider in 2weeks.  No follow up labs or test are  needed.  Follow up with specialist, orthopedic surgery, in 2-4 weeks.     Symptoms - Fever Management    A low grade fever can be expected after surgery.  Use acetaminophen (TYLENOL) as needed for fever management.  Contact your Surgeon Team if you have a fever greater than 101.5 F, chills, and/or night sweats.     Symptoms - Constipation management    Constipation (hard, dry bowel movements) is expected after surgery due to the combination of being less active, the anesthetic, and the opioid pain medication.  You can do the following to help reduce constipation:  ~  FLUIDS:  Drink clear liquids (water or Gatorade), or juice (apple/prune).  ~  DIET:  Eat a fiber rich diet.    ~  ACTIVITY:  Get up and move around several times a day.  Increase your activity as you are able.  MEDICATIONS:  Reduce the risk of constipation by starting medications before you are constipated.  You can take Miralax   (1 packet as directed) and/or a stool softener (Senokot 1-2 tablets 1-2 times a day).  If you already have constipation and these medications are not working, you can get magnesium citrate and use as directed.  If you continue to have constipation you can try an over the counter suppository or enema.  Call your Surgeon Team if it has been greater than 3 days since your last bowel movement.     Comfort and Pain Management - Cold therapy    Ice can be used to control swelling and discomfort after surgery. Place a thin towel over your operative site and apply the ice pack overtop. Leave ice pack in place for 20 minutes, then remove for 20 minutes. Repeat this 20 minutes on/20 minutes off routine as often as tolerated.     Medication Instructions - Acetaminophen (TYLENOL) Instructions    You were discharged with acetaminophen (TYLENOL) for pain management after surgery. Acetaminophen most effectively manages pain symptoms when it is taken on a schedule without missing doses (every four, six, or eight hours). Your Provider will  prescribe a safe daily dose between 3000 - 4000 mg.  Do NOT exceed this daily dose. Most patients use acetaminophen for pain control for the first four weeks after surgery.  You can wean from this medication as your pain decreases.     Medication Instructions - Opioids - Tapering Instructions    In the first three days following surgery, your symptoms may warrant use of the narcotic pain medication every four to six hours as prescribed. This is normal. As your pain symptoms improve, focus your efforts on decreasing (tapering) use of narcotic medications. The most successful tapering strategy is to first, decrease the number of tablets you take every 4-6 hours to the minimum prescribed. Then, increase the amount of time between doses.  For example:  First, taper to   or 1 tablet every 4-6 hours.  Then, taper to   or 1 tablet every 6-8 hours.  Then, taper to   or 1 tablet every 8-10 hours.  Then, taper to   or 1 tablet every 10-12 hours.  Then, taper to   or 1 tablet at bedtime.  The bedtime dose can help with comfort during sleep and is typically the last dose to be discontinued after surgery.     Follow Up Care    Please follow-up with Dr. Tinajero's team in 2 weeks at [facility] for repeat imaging of your acetabular fracture. Call our scheduling line at [phone] to make an appointment, if you do not already have one scheduled.     Activity - Exercises to prevent blood clots    Unless otherwise directed by your Surgeon team, perform the following exercises at least three times per day for the first four weeks after surgery to prevent blood clots in your legs: 1) Point and flex your feet (Ankle Pumps), 2) Move your ankle around in big circles, 3) Wiggle your toes, 4) Walk, even for short distances, several times a day, will help decrease the risk of blood clots.     Medication Instructions - Opioid Instructions (Greater than or equal to 65 years)    You were discharged with an opioid medication  (hydromorphone, oxycodone, hydrocodone, or tramadol). This medication should only be taken for breakthrough pain that is not controlled with acetaminophen (TYLENOL). If you rate your pain less than 3 you do not need this medication.  Pain rating 0-3:  You do not need this medication  Pain rating 4-6:  Take 1/2 tablet every 4-6 hours as needed  Pain rating 7-10:  Take 1 tablet every 4-6 hours as needed  Do not exceed 4 tablets per day     Weight bearing status    Protected 50% weightbearing for right hip. Assist of 1 with gait belt for transferring.     Physical Therapy Adult Consult    Evaluate and treat as clinically indicated.    Reason: Status Post Hip Surgery     Occupational Therapy Adult Consult    Evaluate and treat as clinically indicated.    Reason: Status Post Hip Surgery     Fall precautions     Pneumatic Compression Device     Bilateral calf. Remove 30 mins BID.     Diet    Follow this diet upon discharge: Orders Placed This Encounter      Regular Diet Adult       Examination   Physical Exam   Temp:  [97.2  F (36.2  C)-97.8  F (36.6  C)] 97.4  F (36.3  C)  Pulse:  [77-87] 78  Resp:  [18-24] 18  BP: (115-146)/(58-63) 115/58  SpO2:  [97 %-98 %] 97 %  Wt Readings from Last 1 Encounters:   09/22/22 82.3 kg (181 lb 8 oz)          GENERAL:  Alert, appears comfortable, in no acute distress, appears stated age   LUNGS:   Clear to auscultation bilaterally, no rales, rhonchi, or wheezing, symmetric chest rise on inhalation, respirations unlabored   HEART:   rregularly irregular rhythm. Tachycardic. Systolic murmur best heard on the aortic valve area.   ABDOMEN:   Soft, non-tender, bowel sounds active all four quadrants, no masses, no organomegaly, no rebound or guarding   EXTREMITIES:  Tenderness to palpation of the lateral side of the right hip. Limited range of motion of right hip due to pain.   SKIN: Dry to touch.    NEURO:  Cranial nerves grossly intact. Normal motor strength in bilateral upper extremities.  Unable to fully evaluate lower extremities.   PSYCH: Cooperative, behavior is appropriate        Please see EMR for more detailed significant labs, imaging, consultant notes etc.    I, ANAYELI ELLIS MD, personally saw the patient today and spent greater than 30 minutes discharging this patient.    ANAYELI ELLIS MD  [facility]    CC:Luis Mcclellan

## 2022-09-22 NOTE — PROGRESS NOTES
"Orthopedic Progress Note      Assessment:  Closed nondisplaced Right acetabular fracture after mechanical fall    Plan:   - Continue PT/OT.  - Weightbearing status: Protected weight bearing with 50% or less weight bearing with walker  - Anticoagulation: per hospitalist in addition to SCDs, chaz stockings and early ambulation.  - Discharge planning: TCU today. Ortho discharge orders placed.    Subjective:  Pain: Right hip pain improved today on norco  Chest pain, SOB: no  Nausea, Vomiting:  no  Lightheadedness, Dizziness:  no  Neuro:  Patient denies new onset numbness or paresthesias in Right lower extremity    Patient is doing well. Ambulated in hallways with walker.    Objective:  /58 (BP Location: Right arm)   Pulse 78   Temp 97.4  F (36.3  C) (Oral)   Resp 18   Ht 1.803 m (5' 11\")   Wt 82.3 kg (181 lb 8 oz)   SpO2 97%   BMI 25.31 kg/m    The patient is A&Ox3. Appears comfortable. Dressed.  Sensation is intact bilaterally in L2-S1 dermatomes.  ROM: Right hip flexion increased right hip pain.   +5/5 knee extension, DF/PF/EHL bilaterally.  Palpable right dorsalis pedis pulse.  Calves are soft and non-tender. Negative Kobe's.  The incision is covered. Dressing C/D/I.    Pertinent Labs   Lab Results: personally reviewed.   Lab Results   Component Value Date    INR 1.78 (H) 09/22/2022    INR 1.62 (H) 09/21/2022    INR 1.56 (H) 09/20/2022     Lab Results   Component Value Date    WBC 8.8 09/22/2022    HGB 12.8 (L) 09/22/2022    HCT 39.0 (L) 09/22/2022    MCV 99 09/22/2022     09/22/2022     Lab Results   Component Value Date     (L) 09/22/2022    CO2 26 09/22/2022       Report completed by:  Licha Gale PA-C  [facility]    Date: 9/22/2022  Time: 12:29 PM    "

## 2022-09-22 NOTE — PLAN OF CARE
"PRIMARY DIAGNOSIS: \"GENERIC\" NURSING  OUTPATIENT/OBSERVATION GOALS TO BE MET BEFORE DISCHARGE:  ADLs back to baseline: No    Activity and level of assistance: Assist of 1-2 with pivot.    Pain status: Improved-controlled with oral pain medications.    Return to near baseline physical activity: No     Discharge Planner Nurse   Safe discharge environment identified: No  Barriers to discharge: Medical clearance and discharge plan.       Entered by: Taylor R Schoenecker, RN 09/22/2022 10:34 AM    Taylor R Schoenecker, RN    "

## 2022-09-22 NOTE — PROGRESS NOTES
Pt set off bed alarm to get up to bathroom at about 2:30, walked with 1 assist & walker,  then placed in a wheelchair at the desk with staff until room on 3S is cleaned & ready for his transfer to non tele bed.

## 2022-09-22 NOTE — PLAN OF CARE
Note from 0700 to discharge. AVS sent with pt. IV access discontinued. No issues noted. VSS, no shortness of breath.    Taylor R Schoenecker, RN

## 2022-09-23 ENCOUNTER — LAB REQUISITION (OUTPATIENT)
Dept: LAB | Facility: CLINIC | Age: 87
End: 2022-09-23

## 2022-09-23 ENCOUNTER — PATIENT OUTREACH (OUTPATIENT)
Dept: CARE COORDINATION | Facility: CLINIC | Age: 87
End: 2022-09-23

## 2022-09-23 DIAGNOSIS — U07.1 COVID-19: ICD-10-CM

## 2022-09-23 LAB — INR PPP: 3.27 (ref 0.85–1.15)

## 2022-09-23 PROCEDURE — P9604 ONE-WAY ALLOW PRORATED TRIP: HCPCS | Performed by: INTERNAL MEDICINE

## 2022-09-23 PROCEDURE — 36415 COLL VENOUS BLD VENIPUNCTURE: CPT | Performed by: INTERNAL MEDICINE

## 2022-09-23 PROCEDURE — 85610 PROTHROMBIN TIME: CPT | Performed by: INTERNAL MEDICINE

## 2022-09-23 NOTE — PROGRESS NOTES
General acute hospital    Background: Transitional Care Management program auto-identified and prompting a chart review by [facility] team.    Assessment: Upon chart review, CCR Team member will cancel/close this episode of Transitional Care Management program due to reason below:    Patient is not established within [facility] and CCRC team member noted patient discharged to TCU/ARU/LTACH.    Plan: Transitional Care Management episode closed per reason above.      Lizz Santos MA  [facility] [place], [facility]    *Connected Care Resource Team does NOT follow patient ongoing. Referrals are identified based on internal discharge reports and the outreach is to ensure patient has an understanding of their discharge instructions.

## 2022-09-24 ENCOUNTER — LAB REQUISITION (OUTPATIENT)
Dept: LAB | Facility: CLINIC | Age: 87
End: 2022-09-24
Payer: COMMERCIAL

## 2022-09-24 DIAGNOSIS — I48.91 UNSPECIFIED ATRIAL FIBRILLATION (H): ICD-10-CM

## 2022-09-24 LAB — SARS-COV-2 RNA RESP QL NAA+PROBE: NEGATIVE

## 2022-09-26 ENCOUNTER — LAB REQUISITION (OUTPATIENT)
Dept: LAB | Facility: CLINIC | Age: 87
End: 2022-09-26
Payer: COMMERCIAL

## 2022-09-26 DIAGNOSIS — I48.91 UNSPECIFIED ATRIAL FIBRILLATION (H): ICD-10-CM

## 2022-09-26 LAB — INR PPP: 1.63 (ref 0.85–1.15)

## 2022-09-26 PROCEDURE — P9604 ONE-WAY ALLOW PRORATED TRIP: HCPCS | Performed by: INTERNAL MEDICINE

## 2022-09-26 PROCEDURE — 85610 PROTHROMBIN TIME: CPT | Performed by: INTERNAL MEDICINE

## 2022-09-26 PROCEDURE — 36415 COLL VENOUS BLD VENIPUNCTURE: CPT | Performed by: INTERNAL MEDICINE

## 2022-09-27 LAB — INR PPP: 1.78 (ref 0.85–1.15)

## 2022-09-27 PROCEDURE — P9604 ONE-WAY ALLOW PRORATED TRIP: HCPCS | Performed by: INTERNAL MEDICINE

## 2022-09-27 PROCEDURE — 85610 PROTHROMBIN TIME: CPT | Performed by: INTERNAL MEDICINE

## 2022-09-27 PROCEDURE — 36415 COLL VENOUS BLD VENIPUNCTURE: CPT | Performed by: INTERNAL MEDICINE

## 2022-09-28 ENCOUNTER — LAB REQUISITION (OUTPATIENT)
Dept: LAB | Facility: CLINIC | Age: 87
End: 2022-09-28
Payer: COMMERCIAL

## 2022-09-28 DIAGNOSIS — I48.91 UNSPECIFIED ATRIAL FIBRILLATION (H): ICD-10-CM

## 2022-09-28 RX ORDER — LATANOPROST 50 UG/ML
1 SOLUTION/ DROPS OPHTHALMIC DAILY
COMMUNITY
End: 2022-10-05 | Stop reason: HOSPADM

## 2022-09-28 RX ORDER — MULTIPLE VITAMINS W/ MINERALS TAB 9MG-400MCG
1 TAB ORAL
COMMUNITY
End: 2022-10-05 | Stop reason: HOSPADM

## 2022-09-28 RX ORDER — MELOXICAM 7.5 MG/1
7.5 TABLET ORAL DAILY
COMMUNITY
End: 2022-10-05 | Stop reason: HOSPADM

## 2022-09-29 ENCOUNTER — LAB REQUISITION (OUTPATIENT)
Dept: LAB | Facility: CLINIC | Age: 87
End: 2022-09-29

## 2022-09-29 DIAGNOSIS — U07.1 COVID-19: ICD-10-CM

## 2022-09-29 PROCEDURE — U0003 INFECTIOUS AGENT DETECTION BY NUCLEIC ACID (DNA OR RNA); SEVERE ACUTE RESPIRATORY SYNDROME CORONAVIRUS 2 (SARS-COV-2) (CORONAVIRUS DISEASE [COVID-19]), AMPLIFIED PROBE TECHNIQUE, MAKING USE OF HIGH THROUGHPUT TECHNOLOGIES AS DESCRIBED BY CMS-2020-01-R: HCPCS

## 2022-09-30 LAB — SARS-COV-2 RNA RESP QL NAA+PROBE: NEGATIVE

## 2022-10-03 LAB — INR PPP: 2.28 (ref 0.85–1.15)

## 2022-10-03 PROCEDURE — 85610 PROTHROMBIN TIME: CPT | Performed by: INTERNAL MEDICINE

## 2022-10-03 PROCEDURE — 36415 COLL VENOUS BLD VENIPUNCTURE: CPT | Performed by: INTERNAL MEDICINE

## 2022-10-03 PROCEDURE — P9604 ONE-WAY ALLOW PRORATED TRIP: HCPCS | Performed by: INTERNAL MEDICINE

## 2022-10-03 NOTE — PROVIDER NOTIFICATION
Discharge plan according to [facility]:       09/28/22 1147   Discharge Planning   Patient/Family Anticipates Transition to nursing home  (requesting TCU in [place])   Barriers to Discharge no caregiver available after discharge   Concerns to be Addressed all concerns addressed in this encounter   Living Arrangements   People in Home alone   Type of Residence Private Residence   Is your private residence a single family home or apartment? Single family home   Stair Railings, Within Home, Primary railings safe and in good condition   Bathroom Shower/Tub Walk-in shower   Equipment Currently Used at Home none

## 2022-10-04 ENCOUNTER — LAB REQUISITION (OUTPATIENT)
Dept: LAB | Facility: CLINIC | Age: 87
End: 2022-10-04
Payer: COMMERCIAL

## 2022-10-04 DIAGNOSIS — I48.91 UNSPECIFIED ATRIAL FIBRILLATION (H): ICD-10-CM

## 2022-10-05 NOTE — PROVIDER NOTIFICATION
I am evaluating this patient for upcoming Left Unicompartmental Knee Arthroplasty Conversion to Left Total Knee Arthroplasty with Dr. Xavier at [facility] on 10/10/22:    - Patient fell at home on 9/20/22 and sustained a non-displaced right acetabular fracture. He was hospitalized at [facility] 9/20/22- 9/22/22. Orthopedics was consulted at that time. No surgical intervention was recommended but patient was placed on protected 50% weight bearing on RLE. Patient discharged to [facility] TCU on 9/22/22 and is still there recovering from the recent fracture. I spoke to Admissions Director at TCU and she confirmed knee surgery next week will need to be cancelled as patient is still rehabilitating from fracture. I notified Dr. Xavier's team of cancellation and am waiting for patient to call me back to confirm cancellation. Call me if any questions.       JOHNNIE Faye, CNP   Advanced Practice Nurse Navigator- Orthopedics  [facility]   Office Phone: [phone]  Direct Fax: [phone]

## 2022-10-10 LAB — INR PPP: 2.53 (ref 0.85–1.15)

## 2022-10-10 PROCEDURE — 85610 PROTHROMBIN TIME: CPT | Performed by: INTERNAL MEDICINE

## 2022-10-10 PROCEDURE — 36415 COLL VENOUS BLD VENIPUNCTURE: CPT | Performed by: INTERNAL MEDICINE

## 2022-10-10 PROCEDURE — P9604 ONE-WAY ALLOW PRORATED TRIP: HCPCS | Performed by: INTERNAL MEDICINE

## 2022-10-11 ENCOUNTER — LAB REQUISITION (OUTPATIENT)
Dept: LAB | Facility: CLINIC | Age: 87
End: 2022-10-11
Payer: COMMERCIAL

## 2022-10-11 DIAGNOSIS — I48.91 UNSPECIFIED ATRIAL FIBRILLATION (H): ICD-10-CM

## 2022-10-17 LAB — INR PPP: 2.79 (ref 0.85–1.15)

## 2022-10-17 PROCEDURE — P9604 ONE-WAY ALLOW PRORATED TRIP: HCPCS | Performed by: INTERNAL MEDICINE

## 2022-10-17 PROCEDURE — 85610 PROTHROMBIN TIME: CPT | Performed by: INTERNAL MEDICINE

## 2022-10-17 PROCEDURE — 36415 COLL VENOUS BLD VENIPUNCTURE: CPT | Performed by: INTERNAL MEDICINE

## 2022-10-19 ENCOUNTER — LAB REQUISITION (OUTPATIENT)
Dept: LAB | Facility: CLINIC | Age: 87
End: 2022-10-19
Payer: COMMERCIAL

## 2022-10-19 DIAGNOSIS — I48.91 UNSPECIFIED ATRIAL FIBRILLATION (H): ICD-10-CM

## 2022-11-03 ENCOUNTER — HOSPITAL ENCOUNTER (INPATIENT)
Facility: CLINIC | Age: 87
Setting detail: SURGERY ADMIT
End: 2022-11-03
Attending: ORTHOPAEDIC SURGERY | Admitting: ORTHOPAEDIC SURGERY
Payer: COMMERCIAL

## 2022-12-12 VITALS — BODY MASS INDEX: 24.38 KG/M2 | WEIGHT: 180 LBS | HEIGHT: 72 IN

## 2022-12-26 ENCOUNTER — PREP FOR PROCEDURE (OUTPATIENT)
Dept: SURGERY | Facility: CLINIC | Age: 87
End: 2022-12-26

## 2022-12-26 RX ORDER — TRANEXAMIC ACID 650 MG/1
1950 TABLET ORAL ONCE
Status: CANCELLED | OUTPATIENT
Start: 2022-12-26 | End: 2022-12-26

## 2022-12-26 RX ORDER — CEFAZOLIN SODIUM/WATER 2 G/20 ML
2 SYRINGE (ML) INTRAVENOUS
Status: CANCELLED | OUTPATIENT
Start: 2022-12-27

## 2022-12-26 RX ORDER — ACETAMINOPHEN 325 MG/1
975 TABLET ORAL ONCE
Status: CANCELLED | OUTPATIENT
Start: 2022-12-26 | End: 2022-12-26

## 2022-12-26 RX ORDER — CEFAZOLIN SODIUM/WATER 2 G/20 ML
2 SYRINGE (ML) INTRAVENOUS SEE ADMIN INSTRUCTIONS
Status: CANCELLED | OUTPATIENT
Start: 2022-12-27

## 2022-12-27 RX ORDER — SODIUM CHLORIDE, SODIUM LACTATE, POTASSIUM CHLORIDE, CALCIUM CHLORIDE 600; 310; 30; 20 MG/100ML; MG/100ML; MG/100ML; MG/100ML
INJECTION, SOLUTION INTRAVENOUS CONTINUOUS
Status: CANCELLED | OUTPATIENT
Start: 2022-12-27

## 2022-12-27 RX ORDER — FENTANYL CITRATE 50 UG/ML
25-100 INJECTION, SOLUTION INTRAMUSCULAR; INTRAVENOUS
Status: CANCELLED | OUTPATIENT
Start: 2022-12-27

## 2022-12-27 RX ORDER — LIDOCAINE 40 MG/G
CREAM TOPICAL
Status: CANCELLED | OUTPATIENT
Start: 2022-12-27

## 2023-01-01 ENCOUNTER — HOSPITAL ENCOUNTER (EMERGENCY)
Facility: CLINIC | Age: 88
Discharge: HOME | End: 2023-12-17
Attending: EMERGENCY MEDICINE | Admitting: EMERGENCY MEDICINE
Payer: COMMERCIAL

## 2023-01-01 ENCOUNTER — APPOINTMENT (OUTPATIENT)
Dept: CT IMAGING | Facility: CLINIC | Age: 88
End: 2023-01-01
Attending: EMERGENCY MEDICINE
Payer: COMMERCIAL

## 2023-01-01 ENCOUNTER — HOSPITAL ENCOUNTER (EMERGENCY)
Facility: CLINIC | Age: 88
Discharge: HOME | End: 2023-12-26
Attending: EMERGENCY MEDICINE | Admitting: EMERGENCY MEDICINE
Payer: COMMERCIAL

## 2023-01-01 ENCOUNTER — NURSE TRIAGE (OUTPATIENT)
Dept: NURSING | Facility: CLINIC | Age: 88
End: 2023-01-01
Payer: COMMERCIAL

## 2023-01-01 VITALS
DIASTOLIC BLOOD PRESSURE: 86 MMHG | HEIGHT: 71 IN | TEMPERATURE: 97.4 F | RESPIRATION RATE: 18 BRPM | HEART RATE: 88 BPM | SYSTOLIC BLOOD PRESSURE: 123 MMHG | BODY MASS INDEX: 23.1 KG/M2 | OXYGEN SATURATION: 95 % | WEIGHT: 165 LBS

## 2023-01-01 VITALS
RESPIRATION RATE: 20 BRPM | WEIGHT: 180 LBS | DIASTOLIC BLOOD PRESSURE: 76 MMHG | TEMPERATURE: 97.5 F | SYSTOLIC BLOOD PRESSURE: 113 MMHG | OXYGEN SATURATION: 98 % | BODY MASS INDEX: 24.38 KG/M2 | HEIGHT: 72 IN | HEART RATE: 105 BPM

## 2023-01-01 DIAGNOSIS — R79.1 ELEVATED INR: ICD-10-CM

## 2023-01-01 DIAGNOSIS — R10.31 ABDOMINAL CRAMPING, BILATERAL LOWER QUADRANT: ICD-10-CM

## 2023-01-01 DIAGNOSIS — K59.00 CONSTIPATION, UNSPECIFIED CONSTIPATION TYPE: ICD-10-CM

## 2023-01-01 DIAGNOSIS — R10.32 ABDOMINAL CRAMPING, BILATERAL LOWER QUADRANT: ICD-10-CM

## 2023-01-01 LAB
ALBUMIN SERPL BCG-MCNC: 3.9 G/DL (ref 3.5–5.2)
ALBUMIN SERPL BCG-MCNC: 4.1 G/DL (ref 3.5–5.2)
ALBUMIN UR-MCNC: 10 MG/DL
ALBUMIN UR-MCNC: 30 MG/DL
ALP SERPL-CCNC: 36 U/L (ref 40–150)
ALP SERPL-CCNC: 41 U/L (ref 40–150)
ALT SERPL W P-5'-P-CCNC: 14 U/L (ref 0–70)
ALT SERPL W P-5'-P-CCNC: 15 U/L (ref 0–70)
ANION GAP SERPL CALCULATED.3IONS-SCNC: 10 MMOL/L (ref 7–15)
ANION GAP SERPL CALCULATED.3IONS-SCNC: 10 MMOL/L (ref 7–15)
APPEARANCE UR: CLEAR
APPEARANCE UR: CLEAR
APTT PPP: 32 SECONDS (ref 22–38)
AST SERPL W P-5'-P-CCNC: 17 U/L (ref 0–45)
AST SERPL W P-5'-P-CCNC: 20 U/L (ref 0–45)
ATRIAL RATE - MUSE: 115 BPM
BASOPHILS # BLD AUTO: 0.1 10E3/UL (ref 0–0.2)
BASOPHILS # BLD AUTO: 0.1 10E3/UL (ref 0–0.2)
BASOPHILS NFR BLD AUTO: 1 %
BASOPHILS NFR BLD AUTO: 1 %
BILIRUB DIRECT SERPL-MCNC: 0.22 MG/DL (ref 0–0.3)
BILIRUB DIRECT SERPL-MCNC: <0.2 MG/DL (ref 0–0.3)
BILIRUB SERPL-MCNC: 0.6 MG/DL
BILIRUB SERPL-MCNC: 1 MG/DL
BILIRUB UR QL STRIP: NEGATIVE
BILIRUB UR QL STRIP: NEGATIVE
BUN SERPL-MCNC: 14.6 MG/DL (ref 8–23)
BUN SERPL-MCNC: 26.2 MG/DL (ref 8–23)
CALCIUM SERPL-MCNC: 8.9 MG/DL (ref 8.8–10.2)
CALCIUM SERPL-MCNC: 9.5 MG/DL (ref 8.8–10.2)
CHLORIDE SERPL-SCNC: 102 MMOL/L (ref 98–107)
CHLORIDE SERPL-SCNC: 102 MMOL/L (ref 98–107)
COLOR UR AUTO: ABNORMAL
COLOR UR AUTO: YELLOW
CREAT SERPL-MCNC: 0.7 MG/DL (ref 0.67–1.17)
CREAT SERPL-MCNC: 0.82 MG/DL (ref 0.67–1.17)
DEPRECATED HCO3 PLAS-SCNC: 24 MMOL/L (ref 22–29)
DEPRECATED HCO3 PLAS-SCNC: 26 MMOL/L (ref 22–29)
DIASTOLIC BLOOD PRESSURE - MUSE: NORMAL MMHG
EGFRCR SERPLBLD CKD-EPI 2021: 84 ML/MIN/1.73M2
EGFRCR SERPLBLD CKD-EPI 2021: 88 ML/MIN/1.73M2
EOSINOPHIL # BLD AUTO: 0.2 10E3/UL (ref 0–0.7)
EOSINOPHIL # BLD AUTO: 0.3 10E3/UL (ref 0–0.7)
EOSINOPHIL NFR BLD AUTO: 3 %
EOSINOPHIL NFR BLD AUTO: 4 %
ERYTHROCYTE [DISTWIDTH] IN BLOOD BY AUTOMATED COUNT: 14.6 % (ref 10–15)
ERYTHROCYTE [DISTWIDTH] IN BLOOD BY AUTOMATED COUNT: 15.1 % (ref 10–15)
GLUCOSE SERPL-MCNC: 135 MG/DL (ref 70–99)
GLUCOSE SERPL-MCNC: 137 MG/DL (ref 70–99)
GLUCOSE UR STRIP-MCNC: NEGATIVE MG/DL
GLUCOSE UR STRIP-MCNC: NEGATIVE MG/DL
HCT VFR BLD AUTO: 41.4 % (ref 40–53)
HCT VFR BLD AUTO: 43.5 % (ref 40–53)
HGB BLD-MCNC: 13.9 G/DL (ref 13.3–17.7)
HGB BLD-MCNC: 14.6 G/DL (ref 13.3–17.7)
HGB UR QL STRIP: NEGATIVE
HGB UR QL STRIP: NEGATIVE
HOLD SPECIMEN: NORMAL
HYALINE CASTS: 1 /LPF
IMM GRANULOCYTES # BLD: 0 10E3/UL
IMM GRANULOCYTES # BLD: 0 10E3/UL
IMM GRANULOCYTES NFR BLD: 0 %
IMM GRANULOCYTES NFR BLD: 1 %
INR PPP: 1.62 (ref 0.85–1.15)
INR PPP: 4.22 (ref 0.85–1.15)
INTERPRETATION ECG - MUSE: NORMAL
KETONES UR STRIP-MCNC: NEGATIVE MG/DL
KETONES UR STRIP-MCNC: NEGATIVE MG/DL
LEUKOCYTE ESTERASE UR QL STRIP: ABNORMAL
LEUKOCYTE ESTERASE UR QL STRIP: ABNORMAL
LIPASE SERPL-CCNC: 25 U/L (ref 13–60)
LYMPHOCYTES # BLD AUTO: 0.6 10E3/UL (ref 0.8–5.3)
LYMPHOCYTES # BLD AUTO: 0.7 10E3/UL (ref 0.8–5.3)
LYMPHOCYTES NFR BLD AUTO: 10 %
LYMPHOCYTES NFR BLD AUTO: 7 %
MCH RBC QN AUTO: 33.7 PG (ref 26.5–33)
MCH RBC QN AUTO: 33.8 PG (ref 26.5–33)
MCHC RBC AUTO-ENTMCNC: 33.6 G/DL (ref 31.5–36.5)
MCHC RBC AUTO-ENTMCNC: 33.6 G/DL (ref 31.5–36.5)
MCV RBC AUTO: 101 FL (ref 78–100)
MCV RBC AUTO: 101 FL (ref 78–100)
MONOCYTES # BLD AUTO: 0.4 10E3/UL (ref 0–1.3)
MONOCYTES # BLD AUTO: 0.5 10E3/UL (ref 0–1.3)
MONOCYTES NFR BLD AUTO: 6 %
MONOCYTES NFR BLD AUTO: 7 %
MUCOUS THREADS #/AREA URNS LPF: PRESENT /LPF
NEUTROPHILS # BLD AUTO: 5.7 10E3/UL (ref 1.6–8.3)
NEUTROPHILS # BLD AUTO: 6.8 10E3/UL (ref 1.6–8.3)
NEUTROPHILS NFR BLD AUTO: 78 %
NEUTROPHILS NFR BLD AUTO: 82 %
NITRATE UR QL: NEGATIVE
NITRATE UR QL: NEGATIVE
NRBC # BLD AUTO: 0 10E3/UL
NRBC # BLD AUTO: 0 10E3/UL
NRBC BLD AUTO-RTO: 0 /100
NRBC BLD AUTO-RTO: 0 /100
P AXIS - MUSE: NORMAL DEGREES
PH UR STRIP: 5 [PH] (ref 5–7)
PH UR STRIP: 5 [PH] (ref 5–7)
PLATELET # BLD AUTO: 209 10E3/UL (ref 150–450)
PLATELET # BLD AUTO: 243 10E3/UL (ref 150–450)
POTASSIUM SERPL-SCNC: 3.7 MMOL/L (ref 3.4–5.3)
POTASSIUM SERPL-SCNC: 4.1 MMOL/L (ref 3.4–5.3)
PR INTERVAL - MUSE: NORMAL MS
PROT SERPL-MCNC: 6.4 G/DL (ref 6.4–8.3)
PROT SERPL-MCNC: 6.5 G/DL (ref 6.4–8.3)
QRS DURATION - MUSE: 86 MS
QT - MUSE: 318 MS
QTC - MUSE: 403 MS
R AXIS - MUSE: 66 DEGREES
RBC # BLD AUTO: 4.11 10E6/UL (ref 4.4–5.9)
RBC # BLD AUTO: 4.33 10E6/UL (ref 4.4–5.9)
RBC URINE: 1 /HPF
RBC URINE: 2 /HPF
SODIUM SERPL-SCNC: 136 MMOL/L (ref 135–145)
SODIUM SERPL-SCNC: 138 MMOL/L (ref 135–145)
SP GR UR STRIP: 1.02 (ref 1–1.03)
SP GR UR STRIP: >1.03 (ref 1–1.03)
SQUAMOUS EPITHELIAL: <1 /HPF
SYSTOLIC BLOOD PRESSURE - MUSE: NORMAL MMHG
T AXIS - MUSE: 6 DEGREES
UROBILINOGEN UR STRIP-MCNC: <2 MG/DL
UROBILINOGEN UR STRIP-MCNC: <2 MG/DL
VENTRICULAR RATE- MUSE: 97 BPM
WBC # BLD AUTO: 7.2 10E3/UL (ref 4–11)
WBC # BLD AUTO: 8.2 10E3/UL (ref 4–11)
WBC URINE: 2 /HPF
WBC URINE: 2 /HPF

## 2023-01-01 PROCEDURE — 99285 EMERGENCY DEPT VISIT HI MDM: CPT | Mod: 25

## 2023-01-01 PROCEDURE — 81001 URINALYSIS AUTO W/SCOPE: CPT | Performed by: EMERGENCY MEDICINE

## 2023-01-01 PROCEDURE — 85025 COMPLETE CBC W/AUTO DIFF WBC: CPT | Performed by: EMERGENCY MEDICINE

## 2023-01-01 PROCEDURE — 74177 CT ABD & PELVIS W/CONTRAST: CPT

## 2023-01-01 PROCEDURE — 85610 PROTHROMBIN TIME: CPT | Performed by: EMERGENCY MEDICINE

## 2023-01-01 PROCEDURE — 250N000013 HC RX MED GY IP 250 OP 250 PS 637: Performed by: EMERGENCY MEDICINE

## 2023-01-01 PROCEDURE — 83690 ASSAY OF LIPASE: CPT | Performed by: EMERGENCY MEDICINE

## 2023-01-01 PROCEDURE — 258N000003 HC RX IP 258 OP 636: Performed by: EMERGENCY MEDICINE

## 2023-01-01 PROCEDURE — 93005 ELECTROCARDIOGRAM TRACING: CPT | Performed by: EMERGENCY MEDICINE

## 2023-01-01 PROCEDURE — 96360 HYDRATION IV INFUSION INIT: CPT

## 2023-01-01 PROCEDURE — 36415 COLL VENOUS BLD VENIPUNCTURE: CPT | Performed by: EMERGENCY MEDICINE

## 2023-01-01 PROCEDURE — 80053 COMPREHEN METABOLIC PANEL: CPT | Performed by: EMERGENCY MEDICINE

## 2023-01-01 PROCEDURE — 250N000011 HC RX IP 250 OP 636: Mod: JZ | Performed by: EMERGENCY MEDICINE

## 2023-01-01 PROCEDURE — 82248 BILIRUBIN DIRECT: CPT | Performed by: EMERGENCY MEDICINE

## 2023-01-01 PROCEDURE — 85730 THROMBOPLASTIN TIME PARTIAL: CPT | Performed by: EMERGENCY MEDICINE

## 2023-01-01 RX ORDER — DOCUSATE SODIUM 100 MG/1
100 CAPSULE, LIQUID FILLED ORAL ONCE
Status: COMPLETED | OUTPATIENT
Start: 2023-01-01 | End: 2023-01-01

## 2023-01-01 RX ORDER — POLYETHYLENE GLYCOL 3350 17 G/17G
1 POWDER, FOR SOLUTION ORAL DAILY PRN
Qty: 170 G | Refills: 0 | Status: ON HOLD | OUTPATIENT
Start: 2023-01-01 | End: 2024-01-01

## 2023-01-01 RX ORDER — DOCUSATE SODIUM 100 MG/1
100 CAPSULE, LIQUID FILLED ORAL 2 TIMES DAILY
Qty: 28 CAPSULE | Refills: 0 | Status: SHIPPED | OUTPATIENT
Start: 2023-01-01 | End: 2024-01-01

## 2023-01-01 RX ORDER — IOPAMIDOL 755 MG/ML
90 INJECTION, SOLUTION INTRAVASCULAR ONCE
Status: COMPLETED | OUTPATIENT
Start: 2023-01-01 | End: 2023-01-01

## 2023-01-01 RX ORDER — MAGNESIUM CARB/ALUMINUM HYDROX 105-160MG
296 TABLET,CHEWABLE ORAL ONCE
Qty: 296 ML | Refills: 0 | Status: SHIPPED | OUTPATIENT
Start: 2023-01-01 | End: 2023-01-01

## 2023-01-01 RX ADMIN — GLYCERIN 1 SUPPOSITORY: 2 SUPPOSITORY RECTAL at 20:25

## 2023-01-01 RX ADMIN — MINERAL OIL 226 ML: 1000 LIQUID ORAL at 19:18

## 2023-01-01 RX ADMIN — IOPAMIDOL 90 ML: 755 INJECTION, SOLUTION INTRAVENOUS at 19:03

## 2023-01-01 RX ADMIN — IOPAMIDOL 90 ML: 755 INJECTION, SOLUTION INTRAVENOUS at 16:56

## 2023-01-01 RX ADMIN — DOCUSATE SODIUM 100 MG: 100 CAPSULE, LIQUID FILLED ORAL at 20:25

## 2023-01-01 RX ADMIN — SODIUM CHLORIDE 500 ML: 9 INJECTION, SOLUTION INTRAVENOUS at 18:27

## 2023-01-01 ASSESSMENT — ENCOUNTER SYMPTOMS
COUGH: 0
ABDOMINAL PAIN: 1
SHORTNESS OF BREATH: 0
VOMITING: 1
FEVER: 0
CONSTIPATION: 1
NAUSEA: 0
DIARRHEA: 0

## 2023-01-01 ASSESSMENT — ACTIVITIES OF DAILY LIVING (ADL)
ADLS_ACUITY_SCORE: 35

## 2023-12-17 NOTE — ED PROVIDER NOTES
EMERGENCY DEPARTMENT ENCOUNTER      NAME: Liz Shankar  AGE: 89 year old male  YOB: 1934  MRN: [id number]  EVALUATION DATE & TIME: No admission date for patient encounter.    PCP: Luis Mcclellan    ED PROVIDER: Dalila Barraza M.D.        Chief Complaint   Patient presents with    Abdominal Pain    Constipation         FINAL IMPRESSION:    1. Abdominal cramping, bilateral lower quadrant    2. Constipation, unspecified constipation type            MEDICAL DECISION MAKING:    Liz Shankar is a 89 year old male with history of atrial fibrillation with RVR on warfarin, who presents to the ER with complaints of lower abdominal pain and cramping.     CT scan shows findings for constipation and that would be consistent with patient's history and exam.  Does not appear toxic or septic.  Laboratories reassuring.  We did try pink lady enema without results also tap water without results as patient was unable to retain any of these liquids.  Digital rectal exam does not appreciate any stool for manual disimpaction.  Plan at this time is a dose of oral Colace here in the ER, glycerin suppository and discharge home to try MiraLAX tomorrow if he still not having any bowel movements.  Patient encouraged to increase his water intake at this time to help as well.  Patient and family at bedside agree with this plan and understand what to watch for and when to return to the ER and all of her questions have been answered.      ED COURSE:  3:40pm  I met with the patient to gather history and perform my exam. ED course and treatment discussed.    6:10 PM  Imaging consistent with constipation.  Fair amount of stool near the rectum.  Updated patient and family at bedside.  Plan is to try an enema here in the ER and they agree.    7:22 PM  Nursing tried a pink lady enema.  There was a fair amount of stool in the rectum and not able to hold the enema very well.  Patient has since had a few smaller bowel movements.   Will go ahead and try now some tapwater enema.    8:14 PM  Tapwater enema did not have good results though nursing is unsure how much of it he actually was able to retain.  I did do a manual digital rectal exam and unable to palpate any stool to try a manual disimpaction.  Everything seems to be more proximal.  At this time I feel he can be discharged home to try some MiraLAX at home.  Will give him a dose of Colace and glycerin suppository here in the ER before he leaves.  He will be discharged home with family.    I do not think that this represents ACS, PE, ruptured AAA, aortic dissection, bowel obstruction, bowel ischemia, cholecystitis, pancreatitis, appendicitis, diverticulitis, kidney stone, pyelonephritis, incarcerated or strangulated hernia, testicular torsion,  viscus perforation, perforated GI ulcer, or other such etiologies at this time.    At the conclusion of the encounter I discussed the results of all of the tests and the disposition. Their questions were answered. The patient (and any family present) acknowledged understanding and were agreeable with the care plan.      CONSULTANTS:  none        MEDICATIONS GIVEN IN THE EMERGENCY:  Medications   docusate sodium (COLACE) capsule 100 mg (has no administration in time range)   glycerin (ADULT) Suppository 1 suppository (has no administration in time range)   iopamidol (ISOVUE-370) solution 90 mL (90 mLs Intravenous $Given 12/17/23 1656)   Enema Compound (docusate/mineral oil/NaPhos) NO MAG CIT PREMIX (226 mLs Rectal $Given 12/17/23 1918)           NEW PRESCRIPTIONS STARTED AT TODAY'S ER VISIT     Medication List        Started      polyethylene glycol 17 GM/Dose powder  Commonly known as: MiraLax  1 Capful, Oral, DAILY PRN                  CONDITION:  stable        DISPOSITION:  D.c home with family         =================================================================  =================================================================  TRIAGE  ASSESSMENT:  Pt c/o lower abdominal cramping x1 week. States he does not know when his last BM was and he is usually very regular. Denies nausea or vomiting. State appetite and fluid intake have been normal.         ED Triage Vitals [12/17/23 1517]   Enc Vitals Group      /51      Pulse 92      Resp 20      Temp 97.5  F (36.4  C)      Temp src Temporal      SpO2 99 %      Weight 81.6 kg (180 lb)      Height 1.829 m (6')          ================================================================  ================================================================    HPI    Patient information was obtained from: patient and     Use of Intrepreter: N/A     Liz Shankar is a 89 year old male with history of atrial fibrillation with RVR on warfarin, who presents to the ER with complaints of lower abdominal pain and cramping.    Patient describes cramping across the lower abdomen for about the last 1 week.  He has not had a bowel movement now in several days.  Otherwise denies any fevers, cough, chest pain, shortness of breath, diarrhea, dysuria, hematuria.  He has had 1 episode of vomiting during this.  Patient has not tried anything to help increase bowel movements.     with him shows me a bottle for diltiazem which was recently prescribed patient has not yet started taking this.      REVIEW OF SYSTEMS  Review of Systems   Constitutional:  Negative for fever.   Respiratory:  Negative for cough and shortness of breath.    Cardiovascular:  Negative for chest pain.   Gastrointestinal:  Positive for abdominal pain, constipation and vomiting (x 1). Negative for diarrhea and nausea.   All other systems reviewed and are negative.        PAST MEDICAL HISTORY:  Past Medical History:   Diagnosis Date    Antiplatelet or antithrombotic long-term use     Arthritis     osteoarthritis    Atrial fibrillation (H)     Bilateral chronic knee pain     CAD (coronary artery disease)     Hypotension, unspecified  hypotension type     Irregular heart beat          PAST SURGICAL HISTORY:  Past Surgical History:   Procedure Laterality Date    ORTHOPEDIC SURGERY      TKA         CURRENT MEDICATIONS:    Prior to Admission medications    Medication Sig Start Date End Date Taking? Authorizing Provider   acetaminophen (TYLENOL) 325 MG tablet Take 2 tablets (650 mg) by mouth every 4 hours as needed for mild pain, other, fever or headaches (and adjunct with moderate or severe pain or per patient request) 9/22/22   Flavio Flores MD   bismuth subsalicylate (PEPTO BISMOL) 262 MG/15ML suspension Take 15 mLs by mouth every 6 hours as needed 6/27/22   Unknown, Entered By History   diltiazem ER COATED BEADS (CARDIZEM CD/CARTIA XT) 120 MG 24 hr capsule Take 120 mg by mouth daily 5/24/22   Unknown, Entered By History   docusate sodium (COLACE) 100 MG capsule Take 1 capsule (100 mg) by mouth 2 times daily 7/11/22   Reggie Rachel MD   travoprost BAK FREE (TRAVATAN Z) 0.004 % ophthalmic solution Place 1 drop into both eyes At Bedtime 6/28/22   Unknown, Entered By History   warfarin ANTICOAGULANT (COUMADIN) 5 MG tablet Take 5-7.5 mg by mouth daily 7.5mg on Tuesday and Thursday and 5mg the rest of the days of the week    Unknown, Entered By History   latanoprost (XALATAN) 0.005 % ophthalmic solution 1 drop daily  10/5/22  Reported, Patient         ALLERGIES:  No Known Allergies      FAMILY HISTORY:  No family history on file.      SOCIAL HISTORY:  Social History     Socioeconomic History    Marital status:    Tobacco Use    Smoking status: Former     Packs/day: 1.00     Years: 10.00     Additional pack years: 0.00     Total pack years: 10.00     Types: Cigarettes    Smokeless tobacco: Never   Substance and Sexual Activity    Alcohol use: Never    Drug use: Never         VITALS:  Patient Vitals for the past 24 hrs:   BP Temp Temp src Pulse Resp SpO2 Height Weight   12/17/23 1800 -- -- -- 83 -- 99 % -- --   12/17/23 1517 111/51 97.5  F  (36.4  C) Temporal 92 20 99 % 1.829 m (6') 81.6 kg (180 lb)       Wt Readings from Last 3 Encounters:   12/17/23 81.6 kg (180 lb)   09/22/22 82.3 kg (181 lb 8 oz)   07/18/22 81.6 kg (180 lb)       Estimated Creatinine Clearance: 82.6 mL/min (based on SCr of 0.7 mg/dL).    PHYSICAL EXAM    Constitutional:  Well developed, Well nourished, NAD  HENT:  Normocephalic, Atraumatic, Bilateral external ears normal, Nose normal. Neck- Supple, No stridor.   Eyes:  PERRL, EOMI, Conjunctiva normal, No discharge.  Respiratory:  Normal breath sounds, No respiratory distress, No wheezing, Speaks full sentences easily. No cough.   Cardiovascular:  Normal heart rate, Regular rhythm, No rubs, No gallops. Chest wall nontender.   GI:  No excessive obesity.  Bowel sounds normal, Soft, +mild lower abd tenderness, No masses, No flank tenderness. No rebound or guarding.   : deferred  Musculoskeletal: No cyanosis, No clubbing. Good range of motion in all major joints. No major deformities noted.   Integument:  Warm, Dry, No erythema, No rash.  No petechiae.   Neurologic:  Alert & oriented x 3  Psychiatric:  Affect normal, Cooperative         LAB:  All pertinent labs reviewed and interpreted.  Recent Results (from the past 24 hour(s))   Extra Blue Top Tube    Collection Time: 12/17/23  3:31 PM   Result Value Ref Range    Hold Specimen [facility]    Extra Red Top Tube    Collection Time: 12/17/23  3:31 PM   Result Value Ref Range    Hold Specimen [facility]    Extra Green Top (Lithium Heparin) Tube    Collection Time: 12/17/23  3:31 PM   Result Value Ref Range    Hold Specimen [facility]    Extra Purple Top Tube    Collection Time: 12/17/23  3:31 PM   Result Value Ref Range    Hold Specimen [facility]    Extra Blood Bank Purple Top Tube    Collection Time: 12/17/23  3:31 PM   Result Value Ref Range    Hold Specimen [facility]    Basic metabolic panel    Collection Time: 12/17/23  3:31 PM   Result Value Ref Range    Sodium 136 135 - 145 mmol/L    Potassium 3.7 3.4 - 5.3 mmol/L     Chloride 102 98 - 107 mmol/L    Carbon Dioxide (CO2) 24 22 - 29 mmol/L    Anion Gap 10 7 - 15 mmol/L    Urea Nitrogen 26.2 (H) 8.0 - 23.0 mg/dL    Creatinine 0.70 0.67 - 1.17 mg/dL    GFR Estimate 88 >60 mL/min/1.73m2    Calcium 9.5 8.8 - 10.2 mg/dL    Glucose 137 (H) 70 - 99 mg/dL   Hepatic function panel    Collection Time: 12/17/23  3:31 PM   Result Value Ref Range    Protein Total 6.4 6.4 - 8.3 g/dL    Albumin 4.1 3.5 - 5.2 g/dL    Bilirubin Total 1.0 <=1.2 mg/dL    Alkaline Phosphatase 36 (L) 40 - 150 U/L    AST 17 0 - 45 U/L    ALT 14 0 - 70 U/L    Bilirubin Direct 0.22 0.00 - 0.30 mg/dL   Lipase    Collection Time: 12/17/23  3:31 PM   Result Value Ref Range    Lipase 25 13 - 60 U/L   INR    Collection Time: 12/17/23  3:31 PM   Result Value Ref Range    INR 1.62 (H) 0.85 - 1.15   PTT    Collection Time: 12/17/23  3:31 PM   Result Value Ref Range    aPTT 32 22 - 38 Seconds   CBC with platelets and differential    Collection Time: 12/17/23  3:31 PM   Result Value Ref Range    WBC Count 7.2 4.0 - 11.0 10e3/uL    RBC Count 4.33 (L) 4.40 - 5.90 10e6/uL    Hemoglobin 14.6 13.3 - 17.7 g/dL    Hematocrit 43.5 40.0 - 53.0 %     (H) 78 - 100 fL    MCH 33.7 (H) 26.5 - 33.0 pg    MCHC 33.6 31.5 - 36.5 g/dL    RDW 14.6 10.0 - 15.0 %    Platelet Count 209 150 - 450 10e3/uL    % Neutrophils 78 %    % Lymphocytes 10 %    % Monocytes 6 %    % Eosinophils 4 %    % Basophils 1 %    % Immature Granulocytes 1 %    NRBCs per 100 WBC 0 <1 /100    Absolute Neutrophils 5.7 1.6 - 8.3 10e3/uL    Absolute Lymphocytes 0.7 (L) 0.8 - 5.3 10e3/uL    Absolute Monocytes 0.4 0.0 - 1.3 10e3/uL    Absolute Eosinophils 0.3 0.0 - 0.7 10e3/uL    Absolute Basophils 0.1 0.0 - 0.2 10e3/uL    Absolute Immature Granulocytes 0.0 <=0.4 10e3/uL    Absolute NRBCs 0.0 10e3/uL   UA with Microscopic reflex to Culture    Collection Time: 12/17/23  4:35 PM    Specimen: Urine, Clean Catch   Result Value Ref Range    Color Urine Yellow Colorless, Straw,  Light Yellow, Yellow    Appearance Urine Clear Clear    Glucose Urine Negative Negative mg/dL    Bilirubin Urine Negative Negative    Ketones Urine Negative Negative mg/dL    Specific Gravity Urine >1.030 1.003 - 1.035    Blood Urine Negative Negative    pH Urine 5.0 5.0 - 7.0    Protein Albumin Urine 30 (A) Negative mg/dL    Urobilinogen Urine <2.0 <2.0 mg/dL    Nitrite Urine Negative Negative    Leukocyte Esterase Urine 75 Zamzam/uL (A) Negative    Mucus Urine Present (A) None Seen /LPF    RBC Urine 1 <=2 /HPF    WBC Urine 2 <=5 /HPF    Squamous Epithelials Urine <1 <=1 /HPF    Hyaline Casts Urine 1 <=2 /LPF       Lab Results   Component Value Date    ABORH AB POS 07/08/2022           RADIOLOGY:  Reviewed all pertinent imaging. Please see official radiology report.    CT Abdomen Pelvis w Contrast   Final Result   IMPRESSION:    1.  No acute abdominal or pelvic process   2.  moderate retained colonic stool.   3.  Stable subcentimeter hepatic hypodensity too small to characterize and does not require additional follow-up in the absence of known or suspected malignancy.            EKG:    none      PROCEDURES:  Enema by RN      Medical Decision Making    History:  Supplemental history from: Documented in chart, if applicable and Other:   External Record(s) reviewed: Documented in chart, if applicable.    Work Up:  Chart documentation includes differential considered and any EKGs or imaging independently interpreted by provider, where specified.  In additional to work up documented, I considered the following work up: Documented in chart, if applicable.    External consultation:  Discussion of management with another provider: Documented in chart, if applicable    Complicating factors:  Care impacted by chronic illness: Other: Atrial fibrillation  Care affected by social determinants of health: Access to Medical Care    Disposition considerations: Discharge. I prescribed additional prescription strength  medication(s) as charted. I considered admission, but ultimately discharged patient as I think at this time a trial at home to get things moving would be appropriate as his abdomen overall is very soft.  Does not appear toxic or septic.  No rebound or guarding..      Dalila Barraza M.D. Merged with [facility]  Emergency Medicine and Medical Toxicology  Formerly [facility] EMERGENCY ROOM  [address] [phone]  Dept: [phone]           Dalila Barraza MD  12/17/23 2016

## 2023-12-17 NOTE — ED TRIAGE NOTES
Pt c/o lower abdominal cramping x1 week. States he does not know when his last BM was and he is usually very regular. Denies nausea or vomiting. State appetite and fluid intake have been normal.      Triage Assessment (Adult)       Row Name 12/17/23 1518          Triage Assessment    Airway WDL WDL        Respiratory WDL    Respiratory WDL WDL        Skin Circulation/Temperature WDL    Skin Circulation/Temperature WDL WDL        Cardiac WDL    Cardiac WDL WDL        Peripheral/Neurovascular WDL    Peripheral Neurovascular WDL WDL        Cognitive/Neuro/Behavioral WDL    Cognitive/Neuro/Behavioral WDL WDL

## 2023-12-18 NOTE — ED NOTES
Pt given pink lady enema and had some hard stool just inside of rectum which made enema come out quickly after insertion.  Pt then to toilet and had small BM results.  MD aware and tried tap water enema.  Pt only able to tolerate small amount before needing to go to toilet.  Pt only had watery results after that.

## 2023-12-18 NOTE — DISCHARGE INSTRUCTIONS
Your blood test and the CT scan look good.  Everything points towards constipation as a cause of your pain.    We gave you a dose of Colace stool softener by mouth here in the ER and also a glycerin suppository before you left the ER.    You can fill the prescription for MiraLAX in the morning if you are not having any bowel movements yet.  You take a capful every day as directed.  Also increase the amount of water that you are drinking.    Return to the emergency department if you develop worsening abdominal pain, bloody stools, fevers, or any other concerns.    Thank you for choosing [facility] Emergency Department.  It has been my pleasure caring for you today.     ~Dr. Madi MD

## 2023-12-20 NOTE — TELEPHONE ENCOUNTER
"Triage Call:    Caller: Patient  Calling in asking \"Is Advil an OTC or a prescription mediction\"  Advised it was an OTC medication.  Asked if any other questions, patient stated No and ended call.      Protocol Recommended Disposition: home care    Caller verbalized understanding of instructions and questions answered.      Dalila Marie RN on 12/19/2023 at 11:15 PM      Reason for Disposition    Caller has medicine question only, adult not sick, AND triager answers question    Protocols used: Medication Question Call-A-AH    "

## 2023-12-27 NOTE — ED NOTES
Patient states still not able to give urine sample. Requesting something to eat and drink. Permission for PO obtained from Dr. Del Castillo and juice, crackers and cereal bar given.

## 2023-12-27 NOTE — DISCHARGE INSTRUCTIONS
Continue fiber daily.  Continue miralax daily and start colace twice a day. Take magnesium citrate as directed. Follow up with primary clinic as well. Your INR was elevated to 4 today.  You should not take your warfarin for next 2 days and have INR rechecked this week.  Call to have this done.

## 2023-12-27 NOTE — ED NOTES
Patient attempted to have a BM but states not much came out at all. Abdominal pain 2/10, but still feels like there is a blockage. Pt reports he did void a little while attempting BM so unable to void at this time. States will remember next time. Urinal at bedside with call light.

## 2023-12-27 NOTE — ED TRIAGE NOTES
Pt c/o lower abdominal pain and possibly needing an enema for a GI blockage. Small BM yesterday, last normal unsure. No nausea or vomiting. No fevers. Pt alert.     Triage Assessment (Adult)       Row Name 12/26/23 9620          Triage Assessment    Airway WDL WDL        Respiratory WDL    Respiratory WDL WDL        Skin Circulation/Temperature WDL    Skin Circulation/Temperature WDL WDL        Cardiac WDL    Cardiac WDL WDL        Peripheral/Neurovascular WDL    Peripheral Neurovascular WDL WDL        Cognitive/Neuro/Behavioral WDL    Cognitive/Neuro/Behavioral WDL WDL

## 2023-12-27 NOTE — ED PROVIDER NOTES
Emergency Department Encounter     Evaluation Date & Time:   12/26/2023  6:09 PM    CHIEF COMPLAINT:  Abdominal Pain      Triage Note:              ED COURSE & MEDICAL DECISION MAKING:     ED Course as of 12/26/23 2325   Tue Dec 26, 2023   1849 CBC reassuring.     1948 INR 4.2, no bleeding. Will discuss with him, including need to hold warfarin for 2 days and have INR rechecked.     1948 CT abd/pelvis neg for acute pathology, no obstruction.      Awaiting UA.   2254 UA neg for infection.     2301 Discussed results with him, Rx for colace, magnesium citrate, outpatient follow up advised.  Pt instructed on holding warfarin for next 2 days, getting this level rechecked.       Pt here by EMS from home for recurrent mild abdominal discomfort. Pt reports he's again constipated, seen last week and improved with enema.  Pt here again stating he needs an enema.  Pt with benign abdomen, but age and symptoms warrant CT, labs. Will hydrate, reassess.  Intact pulses with nothing to suggest aortic catastrophe.  No cp/sob, no cough.  Pt has a history of Afib chronically and anticoagulated.  Pt only taking miralax daily for constipation, nothing else.  He's passing some softer/liquid stool, but states having more difficulty urinating, so CT imaging prudent to rule out obstruction or other pathology.      6:12 PM I met with the patient, obtained history, performed an initial exam, and discussed options and plan for diagnostics and treatment here in the ED.       Medical Decision Making    History:  Supplemental history from: Documented in chart, if applicable  External Record(s) reviewed: Documented in chart, if applicable. and Inpatient Record: 12/17/2023 ED visit    Work Up:  Chart documentation includes differential considered and any EKGs or imaging independently interpreted by provider, where specified.  In additional to work up documented, I considered the following work up: Documented in chart, if applicable.    External  consultation:  Discussion of management with another provider: Documented in chart, if applicable    Complicating factors:  Care impacted by chronic illness: Heart Disease and anticoagulated state  Care affected by social determinants of health: N/A    Disposition considerations: Discharge. I prescribed additional prescription strength medication(s) as charted. I considered admission, but ultimately discharged patient after reassuring workup, negative CT imaging, outpt treatment plan.      At the conclusion of the encounter I discussed the results of all the tests and the disposition. The questions were answered. The patient or family acknowledged understanding and was agreeable with the care plan.      MEDICATIONS GIVEN IN THE EMERGENCY DEPARTMENT:  Medications   sodium chloride 0.9% BOLUS 500 mL (0 mLs Intravenous Stopped 12/26/23 1900)   iopamidol (ISOVUE-370) solution 90 mL (90 mLs Intravenous $Given 12/26/23 1903)       NEW PRESCRIPTIONS STARTED AT TODAY'S ED VISIT:  New Prescriptions    DOCUSATE SODIUM (COLACE) 100 MG CAPSULE    Take 1 capsule (100 mg) by mouth 2 times daily for 14 days    MAGNESIUM CITRATE 1.745 GM/30ML SOLUTION    Take 296 mLs by mouth once for 1 dose       HPI   HPI     Liz Shankar is a 89 year old male with a pertinent history of atrial fibrillation, CAD,  who presents to this ED via walk-in for evaluation of abdominal pain.    Per chart review, the patient was seen at [facility] ER on 12/17/2023 for an evaluation of abdominal cramping across the lower abdomen for the past week. Last bowel movement occurred within past several days. CT abdomen revealed moderate retained colonic stool. Pink lady enema and tapwater enema given with complications of enema being retained. Manual digital rectal exam was performed and unable to palpate any stool. Patient was discharged with MiraLAX.    The patient is having abdominal discomfort given his constipation. He managed to have a small bowel movement  "today consisting of a minimal amount of liquid stool. He states he was seen here in the ER for he same symptoms and tolerated an enema well at that time. He has also been taking MiraLAX at home since that ER visit. The patient is complaining of urinary retention and notes using a self-catheter if needed, but he is able to void independently. The patient has a history of atrial fibrillation and is on Eliquis for management.    Otherwise, the patient denied having nausea, vomiting, rectal pain,fevers, and any other medical complaints or concerns at this time.        REVIEW OF SYSTEMS:  See HPI      Medical History     Past Medical History:   Diagnosis Date    Antiplatelet or antithrombotic long-term use     Arthritis     Atrial fibrillation (H)     Bilateral chronic knee pain     CAD (coronary artery disease)     Hypotension, unspecified hypotension type     Irregular heart beat        Past Surgical History:   Procedure Laterality Date    ORTHOPEDIC SURGERY      TKA       No family history on file.    Social History     Tobacco Use    Smoking status: Former     Packs/day: 1.00     Years: 10.00     Additional pack years: 0.00     Total pack years: 10.00     Types: Cigarettes    Smokeless tobacco: Never   Substance Use Topics    Alcohol use: Never    Drug use: Never       docusate sodium (COLACE) 100 MG capsule  magnesium citrate 1.745 GM/30ML solution  acetaminophen (TYLENOL) 325 MG tablet  bismuth subsalicylate (PEPTO BISMOL) 262 MG/15ML suspension  diltiazem ER COATED BEADS (CARDIZEM CD/CARTIA XT) 120 MG 24 hr capsule  docusate sodium (COLACE) 100 MG capsule  polyethylene glycol (MIRALAX) 17 GM/Dose powder  travoprost NURY FREE (TRAVATAN Z) 0.004 % ophthalmic solution  warfarin ANTICOAGULANT (COUMADIN) 5 MG tablet        Physical Exam     Vitals:  /74   Pulse 88   Temp 97.4  F (36.3  C) (Oral)   Resp 18   Ht 1.803 m (5' 11\")   Wt 74.8 kg (165 lb)   SpO2 95%   BMI 23.01 kg/m      PHYSICAL EXAM:   Physical " Exam  Vitals and nursing note reviewed.   Constitutional:       General: He is not in acute distress.     Comments: Elderly appearing   HENT:      Head: Normocephalic and atraumatic.      Nose: Nose normal.      Mouth/Throat:      Mouth: Mucous membranes are moist.   Eyes:      Pupils: Pupils are equal, round, and reactive to light.   Cardiovascular:      Rate and Rhythm: Normal rate. Rhythm irregular.      Pulses: Normal pulses.           Radial pulses are 2+ on the right side and 2+ on the left side.        Dorsalis pedis pulses are 2+ on the right side and 2+ on the left side.   Pulmonary:      Effort: Pulmonary effort is normal. No respiratory distress.      Breath sounds: Normal breath sounds.   Abdominal:      General: There is no distension.      Palpations: Abdomen is soft.      Tenderness: There is no abdominal tenderness.      Comments: No rigidity   Musculoskeletal:      Cervical back: Full passive range of motion without pain and neck supple.      Comments: No calf tenderness or swelling b/l   Skin:     General: Skin is warm.      Findings: No rash.   Neurological:      General: No focal deficit present.      Mental Status: He is alert. Mental status is at baseline.      Comments: Fluent speech, no acute lateralizing deficits   Psychiatric:         Mood and Affect: Mood normal.         Behavior: Behavior normal.         Results     LAB:  All pertinent labs reviewed and interpreted  Labs Ordered and Resulted from Time of ED Arrival to Time of ED Departure   INR - Abnormal       Result Value    INR 4.22 (*)    BASIC METABOLIC PANEL - Abnormal    Sodium 138      Potassium 4.1      Chloride 102      Carbon Dioxide (CO2) 26      Anion Gap 10      Urea Nitrogen 14.6      Creatinine 0.82      GFR Estimate 84      Calcium 8.9      Glucose 135 (*)    ROUTINE UA WITH MICROSCOPIC REFLEX TO CULTURE - Abnormal    Color Urine Light Yellow      Appearance Urine Clear      Glucose Urine Negative      Bilirubin Urine  Negative      Ketones Urine Negative      Specific Gravity Urine 1.020      Blood Urine Negative      pH Urine 5.0      Protein Albumin Urine 10 (*)     Urobilinogen Urine <2.0      Nitrite Urine Negative      Leukocyte Esterase Urine 75 Zamzam/uL (*)     RBC Urine 2      WBC Urine 2     CBC WITH PLATELETS AND DIFFERENTIAL - Abnormal    WBC Count 8.2      RBC Count 4.11 (*)     Hemoglobin 13.9      Hematocrit 41.4       (*)     MCH 33.8 (*)     MCHC 33.6      RDW 15.1 (*)     Platelet Count 243      % Neutrophils 82      % Lymphocytes 7      % Monocytes 7      % Eosinophils 3      % Basophils 1      % Immature Granulocytes 0      NRBCs per 100 WBC 0      Absolute Neutrophils 6.8      Absolute Lymphocytes 0.6 (*)     Absolute Monocytes 0.5      Absolute Eosinophils 0.2      Absolute Basophils 0.1      Absolute Immature Granulocytes 0.0      Absolute NRBCs 0.0     HEPATIC FUNCTION PANEL - Normal    Protein Total 6.5      Albumin 3.9      Bilirubin Total 0.6      Alkaline Phosphatase 41      AST 20      ALT 15      Bilirubin Direct <0.20         RADIOLOGY:  CT Abdomen Pelvis w Contrast   Final Result   IMPRESSION:    1.  No acute findings to explain the patient's symptoms. No bowel obstruction, inflammatory process or hydronephrosis.   2.  Colonic diverticulosis without evidence of acute diverticulitis. Mild colonic stool burden.   3.  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   4.  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                   ECG:  Afib, rate 97, no acute ischemia    I have independently reviewed and interpreted the EKG(s) documented above     PROCEDURES:  Procedures:  none      FINAL IMPRESSION:    ICD-10-CM    1. Constipation, unspecified constipation type  K59.00       2. Elevated INR  R79.1           0 minutes of critical care time      IJovon, am serving as a scribe to document services personally performed by Dr. Raj Del Castillo,  based on my observations and the provider's statements to me. I, Raj Del Castillo, DO attest that Jovon Ch is acting in a scribe capacity, has observed my performance of the services and has documented them in accordance with my direction.      Raj Del Castillo DO  Emergency Medicine  [facility] EMERGENCY ROOM  12/26/2023  6:12 PM          Raj Del Castillo MD  12/26/23 7726

## 2024-01-01 ENCOUNTER — APPOINTMENT (OUTPATIENT)
Dept: OCCUPATIONAL THERAPY | Facility: CLINIC | Age: 89
End: 2024-01-01
Payer: COMMERCIAL

## 2024-01-01 ENCOUNTER — MEDICAL CORRESPONDENCE (OUTPATIENT)
Dept: HEALTH INFORMATION MANAGEMENT | Facility: CLINIC | Age: 89
End: 2024-01-01

## 2024-01-01 ENCOUNTER — TELEPHONE (OUTPATIENT)
Dept: GERIATRICS | Facility: CLINIC | Age: 89
End: 2024-01-01
Payer: COMMERCIAL

## 2024-01-01 ENCOUNTER — APPOINTMENT (OUTPATIENT)
Dept: CT IMAGING | Facility: CLINIC | Age: 89
End: 2024-01-01
Attending: EMERGENCY MEDICINE
Payer: COMMERCIAL

## 2024-01-01 ENCOUNTER — HOSPITAL ENCOUNTER (OUTPATIENT)
Facility: CLINIC | Age: 89
Setting detail: OBSERVATION
Discharge: NURSING, REHAB OR LONG-TERM CARE | End: 2024-01-22
Attending: EMERGENCY MEDICINE | Admitting: FAMILY MEDICINE
Payer: COMMERCIAL

## 2024-01-01 ENCOUNTER — NURSING HOME VISIT (OUTPATIENT)
Dept: GERIATRICS | Facility: CLINIC | Age: 89
End: 2024-01-01
Payer: COMMERCIAL

## 2024-01-01 ENCOUNTER — HOSPITAL ENCOUNTER (EMERGENCY)
Facility: CLINIC | Age: 89
Discharge: HOME | End: 2024-01-13
Attending: EMERGENCY MEDICINE | Admitting: EMERGENCY MEDICINE
Payer: COMMERCIAL

## 2024-01-01 ENCOUNTER — LAB REQUISITION (OUTPATIENT)
Dept: LAB | Facility: CLINIC | Age: 89
End: 2024-01-01
Payer: COMMERCIAL

## 2024-01-01 ENCOUNTER — APPOINTMENT (OUTPATIENT)
Dept: RADIOLOGY | Facility: CLINIC | Age: 89
End: 2024-01-01
Attending: EMERGENCY MEDICINE
Payer: COMMERCIAL

## 2024-01-01 ENCOUNTER — HOSPITAL ENCOUNTER (EMERGENCY)
Facility: CLINIC | Age: 89
Discharge: HOME | End: 2024-01-05
Attending: EMERGENCY MEDICINE | Admitting: EMERGENCY MEDICINE
Payer: COMMERCIAL

## 2024-01-01 ENCOUNTER — DOCUMENTATION ONLY (OUTPATIENT)
Dept: GERIATRICS | Facility: CLINIC | Age: 89
End: 2024-01-01

## 2024-01-01 ENCOUNTER — DOCUMENTATION ONLY (OUTPATIENT)
Dept: OTHER | Facility: CLINIC | Age: 89
End: 2024-01-01
Payer: COMMERCIAL

## 2024-01-01 ENCOUNTER — APPOINTMENT (OUTPATIENT)
Dept: PHYSICAL THERAPY | Facility: CLINIC | Age: 89
End: 2024-01-01
Payer: COMMERCIAL

## 2024-01-01 ENCOUNTER — HOSPITAL ENCOUNTER (EMERGENCY)
Facility: CLINIC | Age: 89
Discharge: HOME | End: 2024-02-25
Attending: STUDENT IN AN ORGANIZED HEALTH CARE EDUCATION/TRAINING PROGRAM | Admitting: STUDENT IN AN ORGANIZED HEALTH CARE EDUCATION/TRAINING PROGRAM
Payer: COMMERCIAL

## 2024-01-01 VITALS
WEIGHT: 173 LBS | DIASTOLIC BLOOD PRESSURE: 65 MMHG | HEART RATE: 87 BPM | TEMPERATURE: 97.2 F | OXYGEN SATURATION: 98 % | BODY MASS INDEX: 24.22 KG/M2 | SYSTOLIC BLOOD PRESSURE: 96 MMHG | HEIGHT: 71 IN | RESPIRATION RATE: 18 BRPM

## 2024-01-01 VITALS
HEART RATE: 91 BPM | DIASTOLIC BLOOD PRESSURE: 59 MMHG | WEIGHT: 175 LBS | SYSTOLIC BLOOD PRESSURE: 118 MMHG | OXYGEN SATURATION: 97 % | HEIGHT: 71 IN | RESPIRATION RATE: 16 BRPM | BODY MASS INDEX: 24.5 KG/M2 | TEMPERATURE: 98.6 F

## 2024-01-01 VITALS
HEART RATE: 92 BPM | TEMPERATURE: 97.7 F | OXYGEN SATURATION: 96 % | BODY MASS INDEX: 22.68 KG/M2 | DIASTOLIC BLOOD PRESSURE: 70 MMHG | WEIGHT: 162 LBS | RESPIRATION RATE: 18 BRPM | SYSTOLIC BLOOD PRESSURE: 100 MMHG | HEIGHT: 71 IN

## 2024-01-01 VITALS
WEIGHT: 165 LBS | DIASTOLIC BLOOD PRESSURE: 61 MMHG | HEART RATE: 81 BPM | SYSTOLIC BLOOD PRESSURE: 98 MMHG | RESPIRATION RATE: 16 BRPM | OXYGEN SATURATION: 95 % | TEMPERATURE: 97.6 F | BODY MASS INDEX: 23.01 KG/M2

## 2024-01-01 VITALS
HEART RATE: 62 BPM | RESPIRATION RATE: 16 BRPM | DIASTOLIC BLOOD PRESSURE: 65 MMHG | WEIGHT: 167.8 LBS | SYSTOLIC BLOOD PRESSURE: 102 MMHG | BODY MASS INDEX: 23.4 KG/M2 | OXYGEN SATURATION: 94 % | TEMPERATURE: 96.4 F

## 2024-01-01 VITALS
OXYGEN SATURATION: 99 % | HEIGHT: 71 IN | BODY MASS INDEX: 23.1 KG/M2 | SYSTOLIC BLOOD PRESSURE: 130 MMHG | WEIGHT: 165 LBS | HEART RATE: 80 BPM | RESPIRATION RATE: 20 BRPM | TEMPERATURE: 98.3 F | DIASTOLIC BLOOD PRESSURE: 64 MMHG

## 2024-01-01 VITALS
OXYGEN SATURATION: 93 % | HEART RATE: 84 BPM | TEMPERATURE: 97.8 F | WEIGHT: 162.92 LBS | RESPIRATION RATE: 18 BRPM | BODY MASS INDEX: 22.81 KG/M2 | HEIGHT: 71 IN | DIASTOLIC BLOOD PRESSURE: 46 MMHG | SYSTOLIC BLOOD PRESSURE: 107 MMHG

## 2024-01-01 DIAGNOSIS — Z79.01 CHRONIC ANTICOAGULATION: ICD-10-CM

## 2024-01-01 DIAGNOSIS — H90.3 HEARING LOSS, SENSORINEURAL, ASYMMETRICAL: ICD-10-CM

## 2024-01-01 DIAGNOSIS — N48.89 PENILE SWELLING: ICD-10-CM

## 2024-01-01 DIAGNOSIS — M25.561 CHRONIC PAIN OF BOTH KNEES: ICD-10-CM

## 2024-01-01 DIAGNOSIS — M25.562 CHRONIC PAIN OF BOTH KNEES: ICD-10-CM

## 2024-01-01 DIAGNOSIS — I10 ESSENTIAL (PRIMARY) HYPERTENSION: ICD-10-CM

## 2024-01-01 DIAGNOSIS — R53.1 WEAKNESS: ICD-10-CM

## 2024-01-01 DIAGNOSIS — F03.90 UNSPECIFIED DEMENTIA, UNSPECIFIED SEVERITY, WITHOUT BEHAVIORAL DISTURBANCE, PSYCHOTIC DISTURBANCE, MOOD DISTURBANCE, AND ANXIETY (H): ICD-10-CM

## 2024-01-01 DIAGNOSIS — N40.0 BENIGN PROSTATIC HYPERPLASIA WITHOUT LOWER URINARY TRACT SYMPTOMS: ICD-10-CM

## 2024-01-01 DIAGNOSIS — R62.7 ADULT FAILURE TO THRIVE: Primary | ICD-10-CM

## 2024-01-01 DIAGNOSIS — Z91.81 AT RISK FOR FALLS: ICD-10-CM

## 2024-01-01 DIAGNOSIS — I50.32 CHRONIC DIASTOLIC CHF (CONGESTIVE HEART FAILURE) (H): Primary | ICD-10-CM

## 2024-01-01 DIAGNOSIS — I48.91 ATRIAL FIBRILLATION WITH RVR (H): ICD-10-CM

## 2024-01-01 DIAGNOSIS — I50.32 CHRONIC DIASTOLIC CHF (CONGESTIVE HEART FAILURE) (H): ICD-10-CM

## 2024-01-01 DIAGNOSIS — G89.29 CHRONIC PAIN OF BOTH KNEES: ICD-10-CM

## 2024-01-01 DIAGNOSIS — I48.91 ATRIAL FIBRILLATION WITH RVR (H): Primary | ICD-10-CM

## 2024-01-01 DIAGNOSIS — R10.9 ABDOMINAL PAIN OF UNKNOWN CAUSE: ICD-10-CM

## 2024-01-01 DIAGNOSIS — S32.474A: ICD-10-CM

## 2024-01-01 DIAGNOSIS — I35.0 AORTIC VALVE STENOSIS, ETIOLOGY OF CARDIAC VALVE DISEASE UNSPECIFIED: ICD-10-CM

## 2024-01-01 DIAGNOSIS — R79.1 SUPRATHERAPEUTIC INR: ICD-10-CM

## 2024-01-01 DIAGNOSIS — M62.81 GENERALIZED MUSCLE WEAKNESS: ICD-10-CM

## 2024-01-01 DIAGNOSIS — N47.2 PARAPHIMOSIS: ICD-10-CM

## 2024-01-01 DIAGNOSIS — Z86.59 HISTORY OF DEMENTIA: ICD-10-CM

## 2024-01-01 DIAGNOSIS — R62.7 ADULT FAILURE TO THRIVE: ICD-10-CM

## 2024-01-01 DIAGNOSIS — Z91.81 HISTORY OF FALLING: ICD-10-CM

## 2024-01-01 LAB
ALBUMIN SERPL BCG-MCNC: 3.8 G/DL (ref 3.5–5.2)
ALBUMIN SERPL BCG-MCNC: 4 G/DL (ref 3.5–5.2)
ALBUMIN UR-MCNC: 20 MG/DL
ALBUMIN UR-MCNC: NEGATIVE MG/DL
ALBUMIN UR-MCNC: NEGATIVE MG/DL
ALP SERPL-CCNC: 36 U/L (ref 40–150)
ALP SERPL-CCNC: 42 U/L (ref 40–150)
ALT SERPL W P-5'-P-CCNC: 25 U/L (ref 0–70)
ALT SERPL W P-5'-P-CCNC: 29 U/L (ref 0–70)
ANION GAP SERPL CALCULATED.3IONS-SCNC: 10 MMOL/L (ref 7–15)
ANION GAP SERPL CALCULATED.3IONS-SCNC: 12 MMOL/L (ref 7–15)
ANION GAP SERPL CALCULATED.3IONS-SCNC: 7 MMOL/L (ref 7–15)
ANION GAP SERPL CALCULATED.3IONS-SCNC: 9 MMOL/L (ref 7–15)
APPEARANCE UR: CLEAR
APTT PPP: 92 SECONDS (ref 22–38)
AST SERPL W P-5'-P-CCNC: 26 U/L (ref 0–45)
AST SERPL W P-5'-P-CCNC: 27 U/L (ref 0–45)
ATRIAL RATE - MUSE: 96 BPM
ATRIAL RATE - MUSE: 98 BPM
BACTERIA #/AREA URNS HPF: ABNORMAL /HPF
BACTERIA UR CULT: NORMAL
BASOPHILS # BLD AUTO: 0.1 10E3/UL (ref 0–0.2)
BASOPHILS NFR BLD AUTO: 1 %
BILIRUB SERPL-MCNC: 0.4 MG/DL
BILIRUB SERPL-MCNC: 0.8 MG/DL
BILIRUB UR QL STRIP: NEGATIVE
BUN SERPL-MCNC: 13.5 MG/DL (ref 8–23)
BUN SERPL-MCNC: 18.6 MG/DL (ref 8–23)
BUN SERPL-MCNC: 19.7 MG/DL (ref 8–23)
BUN SERPL-MCNC: 21.9 MG/DL (ref 8–23)
CALCIUM SERPL-MCNC: 9.1 MG/DL (ref 8.8–10.2)
CALCIUM SERPL-MCNC: 9.1 MG/DL (ref 8.8–10.2)
CALCIUM SERPL-MCNC: 9.5 MG/DL (ref 8.8–10.2)
CALCIUM SERPL-MCNC: 9.8 MG/DL (ref 8.8–10.2)
CHLORIDE SERPL-SCNC: 100 MMOL/L (ref 98–107)
CHLORIDE SERPL-SCNC: 101 MMOL/L (ref 98–107)
CHLORIDE SERPL-SCNC: 104 MMOL/L (ref 98–107)
CHLORIDE SERPL-SCNC: 99 MMOL/L (ref 98–107)
COLOR UR AUTO: ABNORMAL
COLOR UR AUTO: ABNORMAL
COLOR UR AUTO: YELLOW
CREAT SERPL-MCNC: 0.6 MG/DL (ref 0.67–1.17)
CREAT SERPL-MCNC: 0.65 MG/DL (ref 0.67–1.17)
CREAT SERPL-MCNC: 0.71 MG/DL (ref 0.67–1.17)
CREAT SERPL-MCNC: 0.86 MG/DL (ref 0.67–1.17)
DEPRECATED HCO3 PLAS-SCNC: 25 MMOL/L (ref 22–29)
DEPRECATED HCO3 PLAS-SCNC: 27 MMOL/L (ref 22–29)
DEPRECATED HCO3 PLAS-SCNC: 27 MMOL/L (ref 22–29)
DEPRECATED HCO3 PLAS-SCNC: 29 MMOL/L (ref 22–29)
DIASTOLIC BLOOD PRESSURE - MUSE: NORMAL MMHG
DIASTOLIC BLOOD PRESSURE - MUSE: NORMAL MMHG
EGFRCR SERPLBLD CKD-EPI 2021: 83 ML/MIN/1.73M2
EGFRCR SERPLBLD CKD-EPI 2021: 88 ML/MIN/1.73M2
EGFRCR SERPLBLD CKD-EPI 2021: 90 ML/MIN/1.73M2
EGFRCR SERPLBLD CKD-EPI 2021: >90 ML/MIN/1.73M2
EOSINOPHIL # BLD AUTO: 0.2 10E3/UL (ref 0–0.7)
EOSINOPHIL # BLD AUTO: 0.2 10E3/UL (ref 0–0.7)
EOSINOPHIL # BLD AUTO: 0.5 10E3/UL (ref 0–0.7)
EOSINOPHIL NFR BLD AUTO: 3 %
EOSINOPHIL NFR BLD AUTO: 4 %
EOSINOPHIL NFR BLD AUTO: 4 %
ERYTHROCYTE [DISTWIDTH] IN BLOOD BY AUTOMATED COUNT: 15.7 % (ref 10–15)
ERYTHROCYTE [DISTWIDTH] IN BLOOD BY AUTOMATED COUNT: 16.6 % (ref 10–15)
ERYTHROCYTE [DISTWIDTH] IN BLOOD BY AUTOMATED COUNT: 16.7 % (ref 10–15)
FLUAV RNA SPEC QL NAA+PROBE: NEGATIVE
FLUBV RNA RESP QL NAA+PROBE: NEGATIVE
GLUCOSE SERPL-MCNC: 152 MG/DL (ref 70–99)
GLUCOSE SERPL-MCNC: 76 MG/DL (ref 70–99)
GLUCOSE SERPL-MCNC: 87 MG/DL (ref 70–99)
GLUCOSE SERPL-MCNC: 96 MG/DL (ref 70–99)
GLUCOSE UR STRIP-MCNC: NEGATIVE MG/DL
HCT VFR BLD AUTO: 37.6 % (ref 40–53)
HCT VFR BLD AUTO: 39 % (ref 40–53)
HCT VFR BLD AUTO: 39.3 % (ref 40–53)
HGB BLD-MCNC: 12.3 G/DL (ref 13.3–17.7)
HGB BLD-MCNC: 12.9 G/DL (ref 13.3–17.7)
HGB BLD-MCNC: 12.9 G/DL (ref 13.3–17.7)
HGB UR QL STRIP: NEGATIVE
HOLD SPECIMEN: NORMAL
HYALINE CASTS: 11 /LPF
IMM GRANULOCYTES # BLD: 0 10E3/UL
IMM GRANULOCYTES # BLD: 0 10E3/UL
IMM GRANULOCYTES # BLD: 0.1 10E3/UL
IMM GRANULOCYTES NFR BLD: 0 %
IMM GRANULOCYTES NFR BLD: 1 %
IMM GRANULOCYTES NFR BLD: 1 %
INR PPP: 1.86 (ref 0.85–1.15)
INR PPP: 1.99 (ref 0.85–1.15)
INR PPP: 2.14 (ref 0.85–1.15)
INR PPP: >10 (ref 0.85–1.15)
INTERPRETATION ECG - MUSE: NORMAL
INTERPRETATION ECG - MUSE: NORMAL
KETONES UR STRIP-MCNC: NEGATIVE MG/DL
LACTATE SERPL-SCNC: 1.4 MMOL/L (ref 0.7–2)
LEUKOCYTE ESTERASE UR QL STRIP: NEGATIVE
LIPASE SERPL-CCNC: 27 U/L (ref 13–60)
LYMPHOCYTES # BLD AUTO: 0.5 10E3/UL (ref 0.8–5.3)
LYMPHOCYTES # BLD AUTO: 0.5 10E3/UL (ref 0.8–5.3)
LYMPHOCYTES # BLD AUTO: 0.6 10E3/UL (ref 0.8–5.3)
LYMPHOCYTES NFR BLD AUTO: 10 %
LYMPHOCYTES NFR BLD AUTO: 5 %
LYMPHOCYTES NFR BLD AUTO: 5 %
MAGNESIUM SERPL-MCNC: 1.9 MG/DL (ref 1.7–2.3)
MCH RBC QN AUTO: 34.3 PG (ref 26.5–33)
MCH RBC QN AUTO: 34.6 PG (ref 26.5–33)
MCH RBC QN AUTO: 35 PG (ref 26.5–33)
MCHC RBC AUTO-ENTMCNC: 32.7 G/DL (ref 31.5–36.5)
MCHC RBC AUTO-ENTMCNC: 32.8 G/DL (ref 31.5–36.5)
MCHC RBC AUTO-ENTMCNC: 33.1 G/DL (ref 31.5–36.5)
MCV RBC AUTO: 105 FL (ref 78–100)
MCV RBC AUTO: 105 FL (ref 78–100)
MCV RBC AUTO: 107 FL (ref 78–100)
MONOCYTES # BLD AUTO: 0.6 10E3/UL (ref 0–1.3)
MONOCYTES NFR BLD AUTO: 6 %
MONOCYTES NFR BLD AUTO: 7 %
MONOCYTES NFR BLD AUTO: 9 %
MUCOUS THREADS #/AREA URNS LPF: PRESENT /LPF
MUCOUS THREADS #/AREA URNS LPF: PRESENT /LPF
NEUTROPHILS # BLD AUTO: 4.7 10E3/UL (ref 1.6–8.3)
NEUTROPHILS # BLD AUTO: 7.6 10E3/UL (ref 1.6–8.3)
NEUTROPHILS # BLD AUTO: 9.4 10E3/UL (ref 1.6–8.3)
NEUTROPHILS NFR BLD AUTO: 75 %
NEUTROPHILS NFR BLD AUTO: 83 %
NEUTROPHILS NFR BLD AUTO: 84 %
NITRATE UR QL: NEGATIVE
NRBC # BLD AUTO: 0 10E3/UL
NRBC BLD AUTO-RTO: 0 /100
NT-PROBNP SERPL-MCNC: 4037 PG/ML (ref 0–1800)
P AXIS - MUSE: NORMAL DEGREES
P AXIS - MUSE: NORMAL DEGREES
PH UR STRIP: 5 [PH] (ref 5–7)
PH UR STRIP: 5 [PH] (ref 5–7)
PH UR STRIP: 7 [PH] (ref 5–7)
PLATELET # BLD AUTO: 268 10E3/UL (ref 150–450)
PLATELET # BLD AUTO: 318 10E3/UL (ref 150–450)
PLATELET # BLD AUTO: 330 10E3/UL (ref 150–450)
POTASSIUM SERPL-SCNC: 3.6 MMOL/L (ref 3.4–5.3)
POTASSIUM SERPL-SCNC: 4 MMOL/L (ref 3.4–5.3)
POTASSIUM SERPL-SCNC: 4.3 MMOL/L (ref 3.4–5.3)
POTASSIUM SERPL-SCNC: 4.6 MMOL/L (ref 3.4–5.3)
PR INTERVAL - MUSE: NORMAL MS
PR INTERVAL - MUSE: NORMAL MS
PROT SERPL-MCNC: 6 G/DL (ref 6.4–8.3)
PROT SERPL-MCNC: 6.4 G/DL (ref 6.4–8.3)
QRS DURATION - MUSE: 84 MS
QRS DURATION - MUSE: 88 MS
QT - MUSE: 348 MS
QT - MUSE: 360 MS
QTC - MUSE: 425 MS
QTC - MUSE: 430 MS
R AXIS - MUSE: 53 DEGREES
R AXIS - MUSE: 71 DEGREES
RBC # BLD AUTO: 3.59 10E6/UL (ref 4.4–5.9)
RBC # BLD AUTO: 3.69 10E6/UL (ref 4.4–5.9)
RBC # BLD AUTO: 3.73 10E6/UL (ref 4.4–5.9)
RBC URINE: 1 /HPF
RSV RNA SPEC NAA+PROBE: NEGATIVE
SARS-COV-2 RNA RESP QL NAA+PROBE: NEGATIVE
SODIUM SERPL-SCNC: 136 MMOL/L (ref 135–145)
SODIUM SERPL-SCNC: 136 MMOL/L (ref 135–145)
SODIUM SERPL-SCNC: 137 MMOL/L (ref 135–145)
SODIUM SERPL-SCNC: 141 MMOL/L (ref 135–145)
SP GR UR STRIP: 1 (ref 1–1.03)
SP GR UR STRIP: 1.01 (ref 1–1.03)
SP GR UR STRIP: 1.01 (ref 1–1.03)
SYSTOLIC BLOOD PRESSURE - MUSE: NORMAL MMHG
SYSTOLIC BLOOD PRESSURE - MUSE: NORMAL MMHG
T AXIS - MUSE: 37 DEGREES
T AXIS - MUSE: 43 DEGREES
TROPONIN T SERPL HS-MCNC: 47 NG/L
TROPONIN T SERPL HS-MCNC: 48 NG/L
TROPONIN T SERPL HS-MCNC: 52 NG/L
TROPONIN T SERPL HS-MCNC: 62 NG/L
UROBILINOGEN UR STRIP-MCNC: <2 MG/DL
UROBILINOGEN UR STRIP-MCNC: <2 MG/DL
UROBILINOGEN UR STRIP-MCNC: NORMAL MG/DL
VENTRICULAR RATE- MUSE: 84 BPM
VENTRICULAR RATE- MUSE: 92 BPM
WBC # BLD AUTO: 11.2 10E3/UL (ref 4–11)
WBC # BLD AUTO: 6.2 10E3/UL (ref 4–11)
WBC # BLD AUTO: 9 10E3/UL (ref 4–11)
WBC URINE: 1 /HPF
WBC URINE: 17 /HPF
WBC URINE: <1 /HPF

## 2024-01-01 PROCEDURE — 99283 EMERGENCY DEPT VISIT LOW MDM: CPT

## 2024-01-01 PROCEDURE — 85025 COMPLETE CBC W/AUTO DIFF WBC: CPT | Performed by: EMERGENCY MEDICINE

## 2024-01-01 PROCEDURE — 36415 COLL VENOUS BLD VENIPUNCTURE: CPT | Performed by: HOSPITALIST

## 2024-01-01 PROCEDURE — 99309 SBSQ NF CARE MODERATE MDM 30: CPT | Performed by: NURSE PRACTITIONER

## 2024-01-01 PROCEDURE — 99309 SBSQ NF CARE MODERATE MDM 30: CPT | Performed by: FAMILY MEDICINE

## 2024-01-01 PROCEDURE — 99285 EMERGENCY DEPT VISIT HI MDM: CPT | Performed by: EMERGENCY MEDICINE

## 2024-01-01 PROCEDURE — 250N000013 HC RX MED GY IP 250 OP 250 PS 637: Performed by: STUDENT IN AN ORGANIZED HEALTH CARE EDUCATION/TRAINING PROGRAM

## 2024-01-01 PROCEDURE — 99239 HOSP IP/OBS DSCHRG MGMT >30: CPT | Performed by: STUDENT IN AN ORGANIZED HEALTH CARE EDUCATION/TRAINING PROGRAM

## 2024-01-01 PROCEDURE — 93005 ELECTROCARDIOGRAM TRACING: CPT | Performed by: EMERGENCY MEDICINE

## 2024-01-01 PROCEDURE — 250N000013 HC RX MED GY IP 250 OP 250 PS 637: Performed by: FAMILY MEDICINE

## 2024-01-01 PROCEDURE — 250N000013 HC RX MED GY IP 250 OP 250 PS 637: Performed by: HOSPITALIST

## 2024-01-01 PROCEDURE — P9603 ONE-WAY ALLOW PRORATED MILES: HCPCS | Performed by: FAMILY MEDICINE

## 2024-01-01 PROCEDURE — 99232 SBSQ HOSP IP/OBS MODERATE 35: CPT | Performed by: STUDENT IN AN ORGANIZED HEALTH CARE EDUCATION/TRAINING PROGRAM

## 2024-01-01 PROCEDURE — 250N000013 HC RX MED GY IP 250 OP 250 PS 637: Performed by: INTERNAL MEDICINE

## 2024-01-01 PROCEDURE — 96360 HYDRATION IV INFUSION INIT: CPT | Mod: 59

## 2024-01-01 PROCEDURE — 74177 CT ABD & PELVIS W/CONTRAST: CPT | Mod: MA

## 2024-01-01 PROCEDURE — 71046 X-RAY EXAM CHEST 2 VIEWS: CPT

## 2024-01-01 PROCEDURE — 99305 1ST NF CARE MODERATE MDM 35: CPT | Performed by: FAMILY MEDICINE

## 2024-01-01 PROCEDURE — 97161 PT EVAL LOW COMPLEX 20 MIN: CPT | Mod: GP

## 2024-01-01 PROCEDURE — 85610 PROTHROMBIN TIME: CPT | Performed by: EMERGENCY MEDICINE

## 2024-01-01 PROCEDURE — 85025 COMPLETE CBC W/AUTO DIFF WBC: CPT | Performed by: STUDENT IN AN ORGANIZED HEALTH CARE EDUCATION/TRAINING PROGRAM

## 2024-01-01 PROCEDURE — G0378 HOSPITAL OBSERVATION PER HR: HCPCS

## 2024-01-01 PROCEDURE — 84484 ASSAY OF TROPONIN QUANT: CPT | Performed by: EMERGENCY MEDICINE

## 2024-01-01 PROCEDURE — 81003 URINALYSIS AUTO W/O SCOPE: CPT | Performed by: EMERGENCY MEDICINE

## 2024-01-01 PROCEDURE — 250N000013 HC RX MED GY IP 250 OP 250 PS 637: Performed by: EMERGENCY MEDICINE

## 2024-01-01 PROCEDURE — 84484 ASSAY OF TROPONIN QUANT: CPT | Mod: 91 | Performed by: EMERGENCY MEDICINE

## 2024-01-01 PROCEDURE — 81001 URINALYSIS AUTO W/SCOPE: CPT | Performed by: EMERGENCY MEDICINE

## 2024-01-01 PROCEDURE — 87086 URINE CULTURE/COLONY COUNT: CPT | Performed by: EMERGENCY MEDICINE

## 2024-01-01 PROCEDURE — 99285 EMERGENCY DEPT VISIT HI MDM: CPT | Mod: 25

## 2024-01-01 PROCEDURE — 99285 EMERGENCY DEPT VISIT HI MDM: CPT | Mod: 25 | Performed by: EMERGENCY MEDICINE

## 2024-01-01 PROCEDURE — 99207 PR APP CREDIT; MD BILLING SHARED VISIT: CPT | Performed by: FAMILY MEDICINE

## 2024-01-01 PROCEDURE — 99232 SBSQ HOSP IP/OBS MODERATE 35: CPT | Performed by: INTERNAL MEDICINE

## 2024-01-01 PROCEDURE — 83880 ASSAY OF NATRIURETIC PEPTIDE: CPT | Performed by: EMERGENCY MEDICINE

## 2024-01-01 PROCEDURE — 36415 COLL VENOUS BLD VENIPUNCTURE: CPT | Performed by: EMERGENCY MEDICINE

## 2024-01-01 PROCEDURE — 80048 BASIC METABOLIC PNL TOTAL CA: CPT | Mod: ORL | Performed by: FAMILY MEDICINE

## 2024-01-01 PROCEDURE — 250N000011 HC RX IP 250 OP 636: Mod: JZ | Performed by: EMERGENCY MEDICINE

## 2024-01-01 PROCEDURE — 97535 SELF CARE MNGMENT TRAINING: CPT | Mod: GO

## 2024-01-01 PROCEDURE — 83735 ASSAY OF MAGNESIUM: CPT | Performed by: STUDENT IN AN ORGANIZED HEALTH CARE EDUCATION/TRAINING PROGRAM

## 2024-01-01 PROCEDURE — 85610 PROTHROMBIN TIME: CPT | Performed by: HOSPITALIST

## 2024-01-01 PROCEDURE — 250N000011 HC RX IP 250 OP 636: Performed by: EMERGENCY MEDICINE

## 2024-01-01 PROCEDURE — 80053 COMPREHEN METABOLIC PANEL: CPT | Performed by: EMERGENCY MEDICINE

## 2024-01-01 PROCEDURE — 87637 SARSCOV2&INF A&B&RSV AMP PRB: CPT | Performed by: EMERGENCY MEDICINE

## 2024-01-01 PROCEDURE — 83605 ASSAY OF LACTIC ACID: CPT | Performed by: EMERGENCY MEDICINE

## 2024-01-01 PROCEDURE — 250N000009 HC RX 250: Performed by: EMERGENCY MEDICINE

## 2024-01-01 PROCEDURE — 80048 BASIC METABOLIC PNL TOTAL CA: CPT | Performed by: EMERGENCY MEDICINE

## 2024-01-01 PROCEDURE — 258N000003 HC RX IP 258 OP 636: Performed by: INTERNAL MEDICINE

## 2024-01-01 PROCEDURE — 99222 1ST HOSP IP/OBS MODERATE 55: CPT | Mod: VID | Performed by: HOSPITALIST

## 2024-01-01 PROCEDURE — 97165 OT EVAL LOW COMPLEX 30 MIN: CPT | Mod: GO

## 2024-01-01 PROCEDURE — 36415 COLL VENOUS BLD VENIPUNCTURE: CPT | Performed by: STUDENT IN AN ORGANIZED HEALTH CARE EDUCATION/TRAINING PROGRAM

## 2024-01-01 PROCEDURE — 36415 COLL VENOUS BLD VENIPUNCTURE: CPT | Mod: ORL | Performed by: FAMILY MEDICINE

## 2024-01-01 PROCEDURE — 258N000003 HC RX IP 258 OP 636: Performed by: EMERGENCY MEDICINE

## 2024-01-01 PROCEDURE — 80053 COMPREHEN METABOLIC PANEL: CPT | Performed by: STUDENT IN AN ORGANIZED HEALTH CARE EDUCATION/TRAINING PROGRAM

## 2024-01-01 PROCEDURE — 85730 THROMBOPLASTIN TIME PARTIAL: CPT | Performed by: EMERGENCY MEDICINE

## 2024-01-01 PROCEDURE — 74177 CT ABD & PELVIS W/CONTRAST: CPT

## 2024-01-01 PROCEDURE — 83690 ASSAY OF LIPASE: CPT | Performed by: STUDENT IN AN ORGANIZED HEALTH CARE EDUCATION/TRAINING PROGRAM

## 2024-01-01 RX ORDER — NITROGLYCERIN 0.4 MG/1
0.4 TABLET SUBLINGUAL EVERY 5 MIN PRN
Status: ON HOLD | COMMUNITY
Start: 2023-02-15 | End: 2024-01-01

## 2024-01-01 RX ORDER — AMOXICILLIN 500 MG/1
2000 TABLET, FILM COATED ORAL PRN
DISCHARGE
Start: 2024-01-01

## 2024-01-01 RX ORDER — ONDANSETRON 2 MG/ML
4 INJECTION INTRAMUSCULAR; INTRAVENOUS EVERY 6 HOURS PRN
Status: DISCONTINUED | OUTPATIENT
Start: 2024-01-01 | End: 2024-01-01 | Stop reason: HOSPADM

## 2024-01-01 RX ORDER — ACETAMINOPHEN 325 MG/1
650 TABLET ORAL EVERY 4 HOURS PRN
DISCHARGE
Start: 2024-01-01

## 2024-01-01 RX ORDER — ONDANSETRON 2 MG/ML
4 INJECTION INTRAMUSCULAR; INTRAVENOUS EVERY 6 HOURS PRN
Status: DISCONTINUED | OUTPATIENT
Start: 2024-01-01 | End: 2024-01-01

## 2024-01-01 RX ORDER — METOPROLOL SUCCINATE 25 MG/1
25 TABLET, EXTENDED RELEASE ORAL DAILY
DISCHARGE
Start: 2024-01-01

## 2024-01-01 RX ORDER — DOCUSATE SODIUM 100 MG/1
100 CAPSULE, LIQUID FILLED ORAL DAILY PRN
Status: DISCONTINUED | OUTPATIENT
Start: 2024-01-01 | End: 2024-01-01 | Stop reason: HOSPADM

## 2024-01-01 RX ORDER — TAMSULOSIN HYDROCHLORIDE 0.4 MG/1
0.4 CAPSULE ORAL DAILY
Status: DISCONTINUED | OUTPATIENT
Start: 2024-01-01 | End: 2024-01-01

## 2024-01-01 RX ORDER — CALCIUM CARBONATE 500 MG/1
1 TABLET, CHEWABLE ORAL 2 TIMES DAILY PRN
DISCHARGE
Start: 2024-01-01

## 2024-01-01 RX ORDER — CIPROFLOXACIN 500 MG/1
500 TABLET, FILM COATED ORAL 2 TIMES DAILY
COMMUNITY
Start: 2024-01-01 | End: 2024-01-01

## 2024-01-01 RX ORDER — ASPIRIN 81 MG
100 TABLET, DELAYED RELEASE (ENTERIC COATED) ORAL DAILY PRN
Status: ON HOLD | COMMUNITY
End: 2024-01-01

## 2024-01-01 RX ORDER — METOPROLOL SUCCINATE 50 MG/1
50 TABLET, EXTENDED RELEASE ORAL DAILY
Status: DISCONTINUED | OUTPATIENT
Start: 2024-01-01 | End: 2024-01-01

## 2024-01-01 RX ORDER — CALCIUM CARBONATE 500 MG/1
1 TABLET, CHEWABLE ORAL 2 TIMES DAILY PRN
Status: ON HOLD | COMMUNITY
End: 2024-01-01

## 2024-01-01 RX ORDER — NITROGLYCERIN 0.4 MG/1
0.4 TABLET SUBLINGUAL EVERY 5 MIN PRN
DISCHARGE
Start: 2024-01-01

## 2024-01-01 RX ORDER — AMOXICILLIN 500 MG/1
4 TABLET, FILM COATED ORAL PRN
Status: ON HOLD | COMMUNITY
Start: 2022-12-19 | End: 2024-01-01

## 2024-01-01 RX ORDER — POLYETHYLENE GLYCOL 3350 17 G/17G
17 POWDER, FOR SOLUTION ORAL DAILY PRN
Status: DISCONTINUED | OUTPATIENT
Start: 2024-01-01 | End: 2024-01-01 | Stop reason: HOSPADM

## 2024-01-01 RX ORDER — OXYCODONE HYDROCHLORIDE 5 MG/1
5 TABLET ORAL ONCE
Status: COMPLETED | OUTPATIENT
Start: 2024-01-01 | End: 2024-01-01

## 2024-01-01 RX ORDER — NITROGLYCERIN 0.4 MG/1
0.4 TABLET SUBLINGUAL EVERY 5 MIN PRN
DISCHARGE
Start: 2024-01-01 | End: 2024-01-01

## 2024-01-01 RX ORDER — WARFARIN SODIUM 5 MG/1
5 TABLET ORAL DAILY
Status: DISCONTINUED | OUTPATIENT
Start: 2024-01-01 | End: 2024-01-01

## 2024-01-01 RX ORDER — ACETAMINOPHEN 325 MG/1
650 TABLET ORAL EVERY 4 HOURS PRN
Status: DISCONTINUED | OUTPATIENT
Start: 2024-01-01 | End: 2024-01-01 | Stop reason: HOSPADM

## 2024-01-01 RX ORDER — AMOXICILLIN 250 MG
2 CAPSULE ORAL 2 TIMES DAILY PRN
Status: DISCONTINUED | OUTPATIENT
Start: 2024-01-01 | End: 2024-01-01 | Stop reason: HOSPADM

## 2024-01-01 RX ORDER — IOPAMIDOL 755 MG/ML
90 INJECTION, SOLUTION INTRAVASCULAR ONCE
Status: COMPLETED | OUTPATIENT
Start: 2024-01-01 | End: 2024-01-01

## 2024-01-01 RX ORDER — CALCIUM CARBONATE 500 MG/1
500 TABLET, CHEWABLE ORAL 2 TIMES DAILY PRN
Status: DISCONTINUED | OUTPATIENT
Start: 2024-01-01 | End: 2024-01-01 | Stop reason: HOSPADM

## 2024-01-01 RX ORDER — METOPROLOL SUCCINATE 25 MG/1
25 TABLET, EXTENDED RELEASE ORAL DAILY
Status: DISCONTINUED | OUTPATIENT
Start: 2024-01-01 | End: 2024-01-01 | Stop reason: HOSPADM

## 2024-01-01 RX ORDER — IOPAMIDOL 755 MG/ML
100 INJECTION, SOLUTION INTRAVASCULAR ONCE
Status: COMPLETED | OUTPATIENT
Start: 2024-01-01 | End: 2024-01-01

## 2024-01-01 RX ORDER — POLYETHYLENE GLYCOL 3350 17 G/17G
1 POWDER, FOR SOLUTION ORAL DAILY PRN
DISCHARGE
Start: 2024-01-01

## 2024-01-01 RX ORDER — WARFARIN SODIUM 5 MG/1
5 TABLET ORAL
Status: COMPLETED | OUTPATIENT
Start: 2024-01-01 | End: 2024-01-01

## 2024-01-01 RX ORDER — METOPROLOL SUCCINATE 25 MG/1
25 TABLET, EXTENDED RELEASE ORAL DAILY
Qty: 30 TABLET | Refills: 0 | Status: SHIPPED | OUTPATIENT
Start: 2024-01-01 | End: 2024-01-01

## 2024-01-01 RX ORDER — AMOXICILLIN 250 MG
1 CAPSULE ORAL 2 TIMES DAILY PRN
Status: DISCONTINUED | OUTPATIENT
Start: 2024-01-01 | End: 2024-01-01 | Stop reason: HOSPADM

## 2024-01-01 RX ORDER — ONDANSETRON 4 MG/1
4 TABLET, ORALLY DISINTEGRATING ORAL EVERY 6 HOURS PRN
Status: DISCONTINUED | OUTPATIENT
Start: 2024-01-01 | End: 2024-01-01

## 2024-01-01 RX ORDER — BISMUTH SUBSALICYLATE 262 MG/1
262 TABLET, CHEWABLE ORAL EVERY 6 HOURS PRN
Status: DISCONTINUED | OUTPATIENT
Start: 2024-01-01 | End: 2024-01-01

## 2024-01-01 RX ORDER — CALCIUM CARBONATE 500 MG/1
1000 TABLET, CHEWABLE ORAL 4 TIMES DAILY PRN
Status: DISCONTINUED | OUTPATIENT
Start: 2024-01-01 | End: 2024-01-01 | Stop reason: DRUGHIGH

## 2024-01-01 RX ORDER — ACETAMINOPHEN 325 MG/1
650 TABLET ORAL ONCE
Status: COMPLETED | OUTPATIENT
Start: 2024-01-01 | End: 2024-01-01

## 2024-01-01 RX ORDER — POLYETHYLENE GLYCOL 3350 17 G/17G
17 POWDER, FOR SOLUTION ORAL DAILY
Status: DISCONTINUED | OUTPATIENT
Start: 2024-01-01 | End: 2024-01-01 | Stop reason: HOSPADM

## 2024-01-01 RX ORDER — ASPIRIN 81 MG
100 TABLET, DELAYED RELEASE (ENTERIC COATED) ORAL DAILY PRN
DISCHARGE
Start: 2024-01-01

## 2024-01-01 RX ORDER — ONDANSETRON 4 MG/1
4 TABLET, ORALLY DISINTEGRATING ORAL EVERY 6 HOURS PRN
Status: DISCONTINUED | OUTPATIENT
Start: 2024-01-01 | End: 2024-01-01 | Stop reason: HOSPADM

## 2024-01-01 RX ORDER — METOPROLOL SUCCINATE 50 MG/1
1 TABLET, EXTENDED RELEASE ORAL DAILY
Status: ON HOLD | COMMUNITY
Start: 2023-01-01 | End: 2024-01-01

## 2024-01-01 RX ORDER — ACETAMINOPHEN 325 MG/1
650 TABLET ORAL EVERY 4 HOURS PRN
Status: DISCONTINUED | OUTPATIENT
Start: 2024-01-01 | End: 2024-01-01

## 2024-01-01 RX ADMIN — DICLOFENAC SODIUM 2 G: 10 GEL TOPICAL at 10:32

## 2024-01-01 RX ADMIN — DOCUSATE SODIUM 100 MG: 100 CAPSULE, LIQUID FILLED ORAL at 08:35

## 2024-01-01 RX ADMIN — METOPROLOL SUCCINATE 25 MG: 25 TABLET, EXTENDED RELEASE ORAL at 10:04

## 2024-01-01 RX ADMIN — APIXABAN 5 MG: 5 TABLET, FILM COATED ORAL at 08:26

## 2024-01-01 RX ADMIN — IOPAMIDOL 89 ML: 755 INJECTION, SOLUTION INTRAVENOUS at 16:26

## 2024-01-01 RX ADMIN — APIXABAN 5 MG: 5 TABLET, FILM COATED ORAL at 19:37

## 2024-01-01 RX ADMIN — APIXABAN 5 MG: 5 TABLET, FILM COATED ORAL at 19:46

## 2024-01-01 RX ADMIN — APIXABAN 5 MG: 5 TABLET, FILM COATED ORAL at 08:41

## 2024-01-01 RX ADMIN — DICLOFENAC SODIUM 2 G: 10 GEL TOPICAL at 05:26

## 2024-01-01 RX ADMIN — ACETAMINOPHEN 650 MG: 325 TABLET, FILM COATED ORAL at 04:39

## 2024-01-01 RX ADMIN — DICLOFENAC SODIUM 2 G: 10 GEL TOPICAL at 02:38

## 2024-01-01 RX ADMIN — APIXABAN 5 MG: 5 TABLET, FILM COATED ORAL at 10:04

## 2024-01-01 RX ADMIN — TAMSULOSIN HYDROCHLORIDE 0.4 MG: 0.4 CAPSULE ORAL at 13:56

## 2024-01-01 RX ADMIN — SODIUM CHLORIDE 500 ML: 9 INJECTION, SOLUTION INTRAVENOUS at 15:51

## 2024-01-01 RX ADMIN — TAMSULOSIN HYDROCHLORIDE 0.4 MG: 0.4 CAPSULE ORAL at 13:10

## 2024-01-01 RX ADMIN — ACETAMINOPHEN 650 MG: 325 TABLET, FILM COATED ORAL at 08:13

## 2024-01-01 RX ADMIN — WARFARIN SODIUM 5 MG: 5 TABLET ORAL at 20:20

## 2024-01-01 RX ADMIN — DICLOFENAC SODIUM 2 G: 10 GEL TOPICAL at 15:47

## 2024-01-01 RX ADMIN — DICLOFENAC SODIUM 2 G: 10 GEL TOPICAL at 18:52

## 2024-01-01 RX ADMIN — METOPROLOL SUCCINATE 50 MG: 50 TABLET, EXTENDED RELEASE ORAL at 07:22

## 2024-01-01 RX ADMIN — TAMSULOSIN HYDROCHLORIDE 0.4 MG: 0.4 CAPSULE ORAL at 12:00

## 2024-01-01 RX ADMIN — DICLOFENAC SODIUM 2 G: 10 GEL TOPICAL at 13:10

## 2024-01-01 RX ADMIN — DICLOFENAC SODIUM 2 G: 10 GEL TOPICAL at 16:02

## 2024-01-01 RX ADMIN — APIXABAN 5 MG: 5 TABLET, FILM COATED ORAL at 08:39

## 2024-01-01 RX ADMIN — ACETAMINOPHEN 650 MG: 325 TABLET, FILM COATED ORAL at 20:31

## 2024-01-01 RX ADMIN — POLYETHYLENE GLYCOL 3350 17 G: 17 POWDER, FOR SOLUTION ORAL at 08:41

## 2024-01-01 RX ADMIN — METOPROLOL SUCCINATE 25 MG: 25 TABLET, EXTENDED RELEASE ORAL at 16:14

## 2024-01-01 RX ADMIN — ACETAMINOPHEN 650 MG: 325 TABLET, FILM COATED ORAL at 20:05

## 2024-01-01 RX ADMIN — DICLOFENAC 2 G: 10 GEL TOPICAL at 16:41

## 2024-01-01 RX ADMIN — APIXABAN 5 MG: 5 TABLET, FILM COATED ORAL at 19:45

## 2024-01-01 RX ADMIN — POLYETHYLENE GLYCOL 3350 17 G: 17 POWDER, FOR SOLUTION ORAL at 14:29

## 2024-01-01 RX ADMIN — ACETAMINOPHEN 650 MG: 325 TABLET, FILM COATED ORAL at 18:07

## 2024-01-01 RX ADMIN — APIXABAN 5 MG: 5 TABLET, FILM COATED ORAL at 07:22

## 2024-01-01 RX ADMIN — IOPAMIDOL 90 ML: 755 INJECTION, SOLUTION INTRAVENOUS at 17:45

## 2024-01-01 RX ADMIN — ACETAMINOPHEN 650 MG: 325 TABLET ORAL at 16:52

## 2024-01-01 RX ADMIN — SENNOSIDES AND DOCUSATE SODIUM 2 TABLET: 8.6; 5 TABLET ORAL at 08:34

## 2024-01-01 RX ADMIN — APIXABAN 5 MG: 5 TABLET, FILM COATED ORAL at 20:05

## 2024-01-01 RX ADMIN — APIXABAN 5 MG: 5 TABLET, FILM COATED ORAL at 08:13

## 2024-01-01 RX ADMIN — POLYETHYLENE GLYCOL 3350 17 G: 17 POWDER, FOR SOLUTION ORAL at 02:53

## 2024-01-01 RX ADMIN — METOPROLOL SUCCINATE 25 MG: 25 TABLET, EXTENDED RELEASE ORAL at 09:49

## 2024-01-01 RX ADMIN — PHYTONADIONE 5 MG: 10 INJECTION, EMULSION INTRAMUSCULAR; INTRAVENOUS; SUBCUTANEOUS at 19:10

## 2024-01-01 RX ADMIN — APIXABAN 5 MG: 5 TABLET, FILM COATED ORAL at 09:49

## 2024-01-01 RX ADMIN — SODIUM CHLORIDE 500 ML: 9 INJECTION, SOLUTION INTRAVENOUS at 14:36

## 2024-01-01 RX ADMIN — ACETAMINOPHEN 650 MG: 325 TABLET, FILM COATED ORAL at 09:01

## 2024-01-01 RX ADMIN — ACETAMINOPHEN 650 MG: 325 TABLET, FILM COATED ORAL at 08:39

## 2024-01-01 RX ADMIN — ACETAMINOPHEN 650 MG: 325 TABLET, FILM COATED ORAL at 05:28

## 2024-01-01 RX ADMIN — ACETAMINOPHEN 650 MG: 325 TABLET, FILM COATED ORAL at 14:29

## 2024-01-01 RX ADMIN — DICLOFENAC SODIUM 2 G: 10 GEL TOPICAL at 20:06

## 2024-01-01 RX ADMIN — TAMSULOSIN HYDROCHLORIDE 0.4 MG: 0.4 CAPSULE ORAL at 13:58

## 2024-01-01 RX ADMIN — OXYCODONE 5 MG: 5 TABLET ORAL at 16:52

## 2024-01-01 RX ADMIN — ACETAMINOPHEN 650 MG: 325 TABLET, FILM COATED ORAL at 02:38

## 2024-01-01 RX ADMIN — TAMSULOSIN HYDROCHLORIDE 0.4 MG: 0.4 CAPSULE ORAL at 16:45

## 2024-01-01 RX ADMIN — TAMSULOSIN HYDROCHLORIDE 0.4 MG: 0.4 CAPSULE ORAL at 12:39

## 2024-01-01 RX ADMIN — APIXABAN 5 MG: 5 TABLET, FILM COATED ORAL at 20:17

## 2024-01-01 RX ADMIN — SENNOSIDES AND DOCUSATE SODIUM 2 TABLET: 8.6; 5 TABLET ORAL at 18:52

## 2024-01-01 RX ADMIN — APIXABAN 5 MG: 5 TABLET, FILM COATED ORAL at 08:58

## 2024-01-01 RX ADMIN — APIXABAN 5 MG: 5 TABLET, FILM COATED ORAL at 21:04

## 2024-01-01 RX ADMIN — METOPROLOL SUCCINATE 25 MG: 25 TABLET, EXTENDED RELEASE ORAL at 08:41

## 2024-01-01 RX ADMIN — ACETAMINOPHEN 650 MG: 325 TABLET, FILM COATED ORAL at 22:16

## 2024-01-01 RX ADMIN — APIXABAN 5 MG: 5 TABLET, FILM COATED ORAL at 20:32

## 2024-01-01 ASSESSMENT — ACTIVITIES OF DAILY LIVING (ADL)
ADLS_ACUITY_SCORE: 32
ADLS_ACUITY_SCORE: 38
ADLS_ACUITY_SCORE: 37
ADLS_ACUITY_SCORE: 35
ADLS_ACUITY_SCORE: 31
ADLS_ACUITY_SCORE: 35
ADLS_ACUITY_SCORE: 38
ADLS_ACUITY_SCORE: 32
ADLS_ACUITY_SCORE: 33
ADLS_ACUITY_SCORE: 35
ADLS_ACUITY_SCORE: 38
ADLS_ACUITY_SCORE: 30
ADLS_ACUITY_SCORE: 31
ADLS_ACUITY_SCORE: 37
ADLS_ACUITY_SCORE: 37
ADLS_ACUITY_SCORE: 32
ADLS_ACUITY_SCORE: 38
ADLS_ACUITY_SCORE: 32
ADLS_ACUITY_SCORE: 31
ADLS_ACUITY_SCORE: 32
ADLS_ACUITY_SCORE: 32
ADLS_ACUITY_SCORE: 39
ADLS_ACUITY_SCORE: 31
ADLS_ACUITY_SCORE: 32
ADLS_ACUITY_SCORE: 32
ADLS_ACUITY_SCORE: 36
ADLS_ACUITY_SCORE: 38
ADLS_ACUITY_SCORE: 32
ADLS_ACUITY_SCORE: 32
ADLS_ACUITY_SCORE: 39
ADLS_ACUITY_SCORE: 30
ADLS_ACUITY_SCORE: 32
ADLS_ACUITY_SCORE: 38
ADLS_ACUITY_SCORE: 35
ADLS_ACUITY_SCORE: 30
ADLS_ACUITY_SCORE: 35
ADLS_ACUITY_SCORE: 32
ADLS_ACUITY_SCORE: 38
ADLS_ACUITY_SCORE: 35
ADLS_ACUITY_SCORE: 38
ADLS_ACUITY_SCORE: 32
ADLS_ACUITY_SCORE: 31
ADLS_ACUITY_SCORE: 35
ADLS_ACUITY_SCORE: 32
ADLS_ACUITY_SCORE: 38
ADLS_ACUITY_SCORE: 38
ADLS_ACUITY_SCORE: 33
ADLS_ACUITY_SCORE: 32
ADLS_ACUITY_SCORE: 38
ADLS_ACUITY_SCORE: 31
ADLS_ACUITY_SCORE: 32
ADLS_ACUITY_SCORE: 33
ADLS_ACUITY_SCORE: 38
ADLS_ACUITY_SCORE: 38
ADLS_ACUITY_SCORE: 30
ADLS_ACUITY_SCORE: 35
ADLS_ACUITY_SCORE: 32
ADLS_ACUITY_SCORE: 31
ADLS_ACUITY_SCORE: 37
ADLS_ACUITY_SCORE: 36
ADLS_ACUITY_SCORE: 37
ADLS_ACUITY_SCORE: 33
ADLS_ACUITY_SCORE: 35
ADLS_ACUITY_SCORE: 31
ADLS_ACUITY_SCORE: 38
ADLS_ACUITY_SCORE: 37
ADLS_ACUITY_SCORE: 33
ADLS_ACUITY_SCORE: 32
ADLS_ACUITY_SCORE: 35
ADLS_ACUITY_SCORE: 38
ADLS_ACUITY_SCORE: 32
ADLS_ACUITY_SCORE: 30
ADLS_ACUITY_SCORE: 37
DEPENDENT_IADLS:: MEDICATION MANAGEMENT;TRANSPORTATION
ADLS_ACUITY_SCORE: 31
ADLS_ACUITY_SCORE: 38
ADLS_ACUITY_SCORE: 31
ADLS_ACUITY_SCORE: 31
ADLS_ACUITY_SCORE: 32
ADLS_ACUITY_SCORE: 31
ADLS_ACUITY_SCORE: 38
ADLS_ACUITY_SCORE: 33
ADLS_ACUITY_SCORE: 31
ADLS_ACUITY_SCORE: 38
ADLS_ACUITY_SCORE: 37
ADLS_ACUITY_SCORE: 32
ADLS_ACUITY_SCORE: 30
ADLS_ACUITY_SCORE: 33
ADLS_ACUITY_SCORE: 32
ADLS_ACUITY_SCORE: 38
ADLS_ACUITY_SCORE: 30

## 2024-01-01 ASSESSMENT — COLUMBIA-SUICIDE SEVERITY RATING SCALE - C-SSRS
2. HAVE YOU ACTUALLY HAD ANY THOUGHTS OF KILLING YOURSELF IN THE PAST MONTH?: NO
6. HAVE YOU EVER DONE ANYTHING, STARTED TO DO ANYTHING, OR PREPARED TO DO ANYTHING TO END YOUR LIFE?: NO
1. IN THE PAST MONTH, HAVE YOU WISHED YOU WERE DEAD OR WISHED YOU COULD GO TO SLEEP AND NOT WAKE UP?: NO

## 2024-01-01 ASSESSMENT — ENCOUNTER SYMPTOMS
EYES NEGATIVE: 1
HEMATOLOGIC/LYMPHATIC NEGATIVE: 1
FREQUENCY: 1
GASTROINTESTINAL NEGATIVE: 1
CARDIOVASCULAR NEGATIVE: 1
ABDOMINAL PAIN: 1
ENDOCRINE NEGATIVE: 1
HEMATURIA: 0
MUSCULOSKELETAL NEGATIVE: 1
ALLERGIC/IMMUNOLOGIC NEGATIVE: 1
NEUROLOGICAL NEGATIVE: 1
PSYCHIATRIC NEGATIVE: 1
RESPIRATORY NEGATIVE: 1

## 2024-01-05 NOTE — ED TRIAGE NOTES
Seen by home care approx every 3 days.  On coumadin for valve replacement and home care checks INR.  On Tuesday, INR was 5.6 but today it was 11.9.  States has been feeling off for about one week.  Complaining of generalized abd pain and has been seen here recently for that. Endorses pain to left middle finger that started 2 days ago and bilateral knee pain (chronic and needs knee surgery but has to be cleared by cardiology. Lives by self and neighbor concerned that he is not taking care of himself: has plumbing problem and has to walk downstairs to use bathroom, concerned that he is taking his medication properly, not eating, etc and would like evaluation of living conditions.      Triage Assessment (Adult)       Row Name 01/05/24 1322          Triage Assessment    Airway WDL WDL        Respiratory WDL    Respiratory WDL WDL                     
negative

## 2024-01-06 NOTE — ED PROVIDER NOTES
EMERGENCY DEPARTMENT ENCOUNTER      NAME: Liz Shankar  AGE: 89 year old male  YOB: 1934  MRN: [id number]  EVALUATION DATE & TIME: 1/5/2024  4:02 PM    PCP: Luis Mcclellan    ED PROVIDER: Deidra Shaffer DO      Chief Complaint   Patient presents with    Abdominal Pain    Abnormal Labs         FINAL IMPRESSION:  1. Supratherapeutic INR          ED COURSE & MEDICAL DECISION MAKING:    Pertinent Labs & Imaging studies reviewed. (See chart for details)  7:56 PM I met the patient and performed my initial interview and exam.    8:30 PM We discussed the plan for discharge and the patient is agreeable. Reviewed supportive cares, symptomatic treatment, outpatient follow up, and reasons to return to the Emergency Department. Patient to be discharged by ED RN.     89 year old male presents to the Emergency Department for evaluation of supratherapeutic INR.  Patient on warfarin for history of atrial fibrillation.  INR today was greater than 10.  Recommended he present to the ED.  Sounds like he has had some supratherapeutic levels recently.  Per home care nurse and patient, he did hold his warfarin for 2 days and took 5 mg yesterday, however unsure if he is taking warfarin on the bottle by mistake.  Patient without any melena, hematuria, signs of bleeding elsewhere.  He denies any falls.  He tells me he continues with generalized lower abdominal tenderness that he has had not been evaluated for in the ED.  He is nontoxic-appearing.  He denies any shortness of breath.  His BNP is elevated.  Patient has a history of aortic stenosis, per chart review had been recommended to have a TAVR but declined.  He denies any chest pain or shortness of breath.  EKG shows atrial fibrillation, however no other evidence of ischemia.  Patient was given vitamin K by mouth per our protocol.  No significant decrease in his hemoglobin.  CT imaging of his abdomen specifically without any evidence of bleeding.  No acute findings.   Discussed results with patient.  He feels safe at home.  He does have home health care come out every few days and I feel he is safe for discharge.  Encouraged him to hold his Coumadin for now, follow-up closely with this clinic and return if any acute worsening of symptoms or signs of bleeding.  At the conclusion of the encounter I discussed the results of all of the tests and the disposition. The questions were answered. The patient or family acknowledged understanding and was agreeable with the care plan.     Medical Decision Making  Obtained supplemental history:Supplemental history obtained?: Documented in chart  Reviewed external records: External records reviewed?: Documented in chart  Care impacted by chronic illness:Anticoagulated State, Cancer/Chemotherapy, Chronic Pain, and Heart Disease  Care significantly affected by social determinants of health:No Transportation for Health Care  Did you consider but not order tests?: Work up considered but not performed and documented in chart, if applicable  Did you interpret images independently?: Independent interpretation of ECG and images noted in documentation, when applicable.  Consultation discussion with other provider:Did you involve another provider (consultant, , pharmacy, etc.)?: No  Discharge. I recommended discontinuing prescription strength medication(s) as charted. I considered admission, but discharged patient after significant clinical improvement.      MEDICATIONS GIVEN IN THE EMERGENCY:  Medications   iopamidol (ISOVUE-370) solution 90 mL (90 mLs Intravenous $Given 1/5/24 1745)   phytonadione (MEPHYTON/VITAMIN K) 1 MG/ML oral solution 5 mg (5 mg Oral $Given 1/5/24 1910)       NEW PRESCRIPTIONS STARTED AT TODAY'S ER VISIT  New Prescriptions    No medications on file          =================================================================    HPI    Patient information was obtained from: Patient and Nurse triage note    Use of : N/A      Liz Shankar is a 89 year old male with a pertinent history of anticoagulation monitoring,CHF, coronary artery disease, and atrial fibrillation, and hypotension who presents to this ED by walk-in for evaluation of elevated INR abdominal pain.    Per chart review, patient had called the nurse triage line at [facility] for anticoagulation. His latest INR results was elevated at 11.9. Home care nurse had been holding his warfarin for the past 2 days and took 5 mg yesterday, his nurse is uncertain If patient was taking warfarin out of the bottle by mistake. Patient's warfarin was held and patient was advised to head to the ED.    Patient comes here after having an INR greater than 11 in clinic today (1/04/24). He also noted having generalized abdominal pain here for couple days and seems to be unchanged.He has had issues with elevated INR and reportedly holding warfarin when recommended.   Per nurse triage note, patient lives by himself and his neighbor was concerned that he is not taking care of himself. Patient reports he has home heatlh care established and gets seen every 3 days.     Patient has not had a fall, bleeding gums, or hematuria. Patient is currently on warfarin. No other complaints at this time.    REVIEW OF SYSTEMS   Review of Systems   HENT:          Denied bleeding gums.   Gastrointestinal:  Positive for abdominal pain (generalized).   Genitourinary:  Negative for hematuria.        PAST MEDICAL HISTORY:  Past Medical History:   Diagnosis Date    Antiplatelet or antithrombotic long-term use     Arthritis     osteoarthritis    Atrial fibrillation (H)     Bilateral chronic knee pain     CAD (coronary artery disease)     Hypotension, unspecified hypotension type     Irregular heart beat        PAST SURGICAL HISTORY:  Past Surgical History:   Procedure Laterality Date    ORTHOPEDIC SURGERY      TKA           CURRENT MEDICATIONS:    acetaminophen (TYLENOL) 325 MG tablet  bismuth  "subsalicylate (PEPTO BISMOL) 262 MG/15ML suspension  diltiazem ER COATED BEADS (CARDIZEM CD/CARTIA XT) 120 MG 24 hr capsule  docusate sodium (COLACE) 100 MG capsule  docusate sodium (COLACE) 100 MG capsule  polyethylene glycol (MIRALAX) 17 GM/Dose powder  travoprost NURY FREE (TRAVATAN Z) 0.004 % ophthalmic solution  warfarin ANTICOAGULANT (COUMADIN) 5 MG tablet         ALLERGIES:  No Known Allergies    FAMILY HISTORY:  No family history on file.    SOCIAL HISTORY:   Social History     Socioeconomic History    Marital status:    Tobacco Use    Smoking status: Former     Packs/day: 1.00     Years: 10.00     Additional pack years: 0.00     Total pack years: 10.00     Types: Cigarettes    Smokeless tobacco: Never   Substance and Sexual Activity    Alcohol use: Never    Drug use: Never       VITALS:  /46   Pulse 85   Temp 98.3  F (36.8  C) (Oral)   Resp 20   Ht 1.803 m (5' 11\")   Wt 74.8 kg (165 lb)   SpO2 97%   BMI 23.01 kg/m      PHYSICAL EXAM    Physical Exam  Constitutional:       General: He is not in acute distress.  HENT:      Head: Normocephalic and atraumatic.      Mouth/Throat:      Pharynx: Oropharynx is clear.   Eyes:      Pupils: Pupils are equal, round, and reactive to light.   Cardiovascular:      Rate and Rhythm: Normal rate and regular rhythm.      Pulses: Normal pulses.      Heart sounds: Normal heart sounds.   Pulmonary:      Effort: Pulmonary effort is normal.      Breath sounds: Normal breath sounds.   Abdominal:      General: Abdomen is flat. Bowel sounds are normal.      Palpations: Abdomen is soft.      Tenderness: There is generalized abdominal tenderness. There is no guarding or rebound.   Musculoskeletal:         General: Normal range of motion.   Skin:     General: Skin is warm and dry.      Capillary Refill: Capillary refill takes less than 2 seconds.      Comments: No suspicious bruising   Neurological:      General: No focal deficit present.      Mental Status: He is " alert and oriented to person, place, and time.           LAB:  All pertinent labs reviewed and interpreted.  Labs Ordered and Resulted from Time of ED Arrival to Time of ED Departure   COMPREHENSIVE METABOLIC PANEL - Abnormal       Result Value    Sodium 136      Potassium 3.6      Carbon Dioxide (CO2) 27      Anion Gap 9      Urea Nitrogen 21.9      Creatinine 0.86      GFR Estimate 83      Calcium 9.5      Chloride 100      Glucose 87      Alkaline Phosphatase 36 (*)     AST 26      ALT 29      Protein Total 6.0 (*)     Albumin 3.8      Bilirubin Total 0.4     TROPONIN T, HIGH SENSITIVITY - Abnormal    Troponin T, High Sensitivity 62 (*)    NT PROBNP INPATIENT - Abnormal    N terminal Pro BNP Inpatient 4,037 (*)    ROUTINE UA WITH MICROSCOPIC REFLEX TO CULTURE - Abnormal    Color Urine Yellow      Appearance Urine Clear      Glucose Urine Negative      Bilirubin Urine Negative      Ketones Urine Negative      Specific Gravity Urine 1.015      Blood Urine Negative      pH Urine 5.0      Protein Albumin Urine 20 (*)     Urobilinogen Urine <2.0      Nitrite Urine Negative      Leukocyte Esterase Urine Negative      Mucus Urine Present (*)     RBC Urine 1      WBC Urine 17 (*)     Hyaline Casts Urine 11 (*)    CBC WITH PLATELETS AND DIFFERENTIAL - Abnormal    WBC Count 6.2      RBC Count 3.59 (*)     Hemoglobin 12.3 (*)     Hematocrit 37.6 (*)      (*)     MCH 34.3 (*)     MCHC 32.7      RDW 15.7 (*)     Platelet Count 268      % Neutrophils 75      % Lymphocytes 10      % Monocytes 9      % Eosinophils 4      % Basophils 1      % Immature Granulocytes 1      NRBCs per 100 WBC 0      Absolute Neutrophils 4.7      Absolute Lymphocytes 0.6 (*)     Absolute Monocytes 0.6      Absolute Eosinophils 0.2      Absolute Basophils 0.1      Absolute Immature Granulocytes 0.0      Absolute NRBCs 0.0     INR - Abnormal    INR >10.00 (*)    PARTIAL THROMBOPLASTIN TIME - Abnormal    aPTT 92 (*)    TROPONIN T, HIGH SENSITIVITY  - Abnormal    Troponin T, High Sensitivity 52 (*)    INFLUENZA A/B, RSV, & SARS-COV2 PCR - Normal    Influenza A PCR Negative      Influenza B PCR Negative      RSV PCR Negative      SARS CoV2 PCR Negative     URINE CULTURE       RADIOLOGY:  Reviewed all pertinent imaging. Please see official radiology report.  CT Abdomen Pelvis w Contrast   Final Result   IMPRESSION:    1.  No acute findings in the abdomen and pelvis.   2.  Colonic diverticulosis without acute diverticulitis.   3.  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      XR Chest 2 Views   Final Result   IMPRESSION: The lungs are clear without focal opacity. Aortic calcification. Otherwise normal cardiomediastinal silhouette. No pleural effusion. Interposition of bowel loops under the right hemidiaphragm. Bilateral rib fracture deformities.          EKG:    Performed at: 1446    Impression: Atrial fibrillation, no acute ST elevations or depressions, no significant changes as compared EKG obtained 12/26/2023    Rate: 84 bpm  Rhythm: Atrial Fibrillation  Axis: normal  MA Interval: n/a  QRS Interval: 84 ms  QTc Interval: 425 ms  ST Changes: n/a    I have independently reviewed and interpreted the EKG(s) documented above.      I, Brandon Nichols , am serving as a scribe to document services personally performed by Dr. Deidra Shaffer based on my observation and the provider's statements to me. Deidra TRIPP DO attest that Brandon Nichols  is acting in a scribe capacity, has observed my performance of the services and has documented them in accordance with my direction.    Deidra Shaffer DO  Emergency Medicine  [facility] EMERGENCY ROOM  [address] [id number] [phone]  Dept: [phone]       Deidra Shaffer DO  01/05/24 5904

## 2024-01-06 NOTE — DISCHARGE INSTRUCTIONS
INR level is very elevated, however no signs of bleeding anywhere  Hold your Coumadin (warfarin) until instructed to restart it  Please call your clinic tomorrow for repeat INR check  Continue with home health care  Return if any acute worsening symptoms, signs of bleeding or any other concerns   Major Shift Events:  Neuro: Pupils 2mm, round equal and sluggish. Slight contraction in BLE, withdraws slightly to pain in BUE,  no commands.      Resp: VC-AC, FiO2 50%, RR 10, , PEEP 7. Overbreathes vent, lung sounds diminished, tongue is protruding from mouth and continuing to swell, WOC has seen pt.     Cards: Sinus tachycardia, -110, MAP goal of 65-85, Nicardipine gtt initiated, pulses palpable in BLE and BUE. IABP 1:1, 100% Augmentation. ECMO flows 3.3 LPM. Goal CO2 40-50.     GI: OG to LIS, 75 mL of green bile out, absent bowel sounds, no BM, senokot and miralax given.     : 50-75 mL/hr UOP via montoya catheter.      GTTs: Heparin gtt for reperfusion catheter, Versed @ 2, Fentanyl @ 100, Insulin off (glucs 120s), Nicardipine @ 7.5     Plan: ECMO Decannulation today, EEG monitoring.     For vital signs and complete assessments, please see documentation flowsheets.

## 2024-01-13 NOTE — ED TRIAGE NOTES
Pt called daughter wanting her to take care of him he's been at home unable to care for himself. Pt was at urgent care, pt was unable to provide urine sample so sent here. Daughter feels pt not safe at home.   Daughter states she's willing to bring pt to her home but does not know if she can care for him.  Pt c/o abdominal pain, +nausea, denies emesis. Denies diarrhea,pt states unable to urinate does not feel urge, thinks last urinated this morning.

## 2024-01-13 NOTE — ED PROVIDER NOTES
EMERGENCY DEPARTMENT ENCOUNTER      NAME: Liz Shankar  AGE: 89 year old male  YOB: 1934  MRN: [id number]  EVALUATION DATE & TIME: No admission date for patient encounter.    PCP: Luis Mcclellan    ED PROVIDER: Gustavo Moon MD    Chief Complaint   Patient presents with    Abdominal Pain     FINAL IMPRESSION:  1. Abdominal pain of unknown cause          ED COURSE & MEDICAL DECISION MAKING:    Pertinent Labs & Imaging studies reviewed. (See chart for details)  89 year old male presents to the Emergency Department for evaluation of mild lower abdominal pain.  He has had some nausea.  Some increased fatigue.  Daughter concerned about his safety for home.  Reporting that she has not become involved in his care until recently.  Brought to urgent care he was unable to urinate so referred to the emergency department for assessment.  Patient on history really has minimal symptoms denies no abdominal pain currently he does indicate an area to the lower abdomen and a band across his abdomen reporting that he periodically feels it.  Thinks he urinated this morning but is not 100% sure.  No black or blood in his stool or change to his stool no change to his urine that he can recall.  No fevers chills no chest pain shortness of breath palpitations or other symptomatology.  On examination it elderly male did not appear to be in acute distress with a benign clinical examination cardiopulmonary exam normal abdominal examination benign.  He did recently have a supratherapeutic INR so has been holding on his Coumadin for the last 3 days he is on that medication for a replacement heart valve.  Were going to pursue broad workup here in the emergency department given his reported intermittent abdominal pain and age I think CT scan imaging is indicated.  We will obtain laboratory testing urinalysis and imaging for additional assessment.  Updated the daughter and patient on test results and plan of care.    Overall  patient's workup was very reassuring.  I talked with the patient he was feeling improved and essentially asymptomatic.  The patient is very comfortable with discharge home and that is his preferred disposition.  Our care management saw the patient is arranging additional follow-up on outpatient basis.  Proceeding with discharge.  Return precautions discussed prior to discharge.      Medical Decision Making  Obtained supplemental history:Supplemental history obtained?: No  Reviewed external records: External records reviewed?: No  Care impacted by chronic illness:Heart Disease  Care significantly affected by social determinants of health:Access to Medical Care  Did you consider but not order tests?: Work up considered but not performed and documented in chart, if applicable  Did you interpret images independently?: Independent interpretation of ECG and images noted in documentation, when applicable.  Consultation discussion with other provider:Did you involve another provider (consultant, , pharmacy, etc.)?: No  Discharge. No recommendations on prescription strength medication(s). I considered admission, but discharged the patient after share decision making conversation.      ED Course as of 01/13/24 1937   Sat Jan 13, 2024   1630 Troponin T, High Sensitivity(!): 47     At the conclusion of the encounter I discussed the results of all of the tests and the disposition. The questions were answered. The patient or family acknowledged understanding and was agreeable with the care plan.     MEDICATIONS GIVEN IN THE EMERGENCY:  Medications   sodium chloride 0.9% BOLUS 500 mL (0 mLs Intravenous Stopped 1/13/24 1700)   iopamidol (ISOVUE-370) solution 100 mL (89 mLs Intravenous $Given 1/13/24 1626)       NEW PRESCRIPTIONS STARTED AT TODAY'S ER VISIT  New Prescriptions    No medications on file          =================================================================    HPI    Patient information was obtained from: Patient  and daughter      Liz Shankar is a 89 year old male with a pertinent history of heart valve chronic anticoagulation atrial fibrillation on Coumadin presents the emergency department with intermittent lower abdominal pain described as a very minimal pain that he has been experiencing over the past 2 to 3 days.  Comes and goes.  Has urge to urinate but able to do so.  Went to urgent care could not produce urine sample there and subsequently was referred to the emergency department for assessment.  The patient is here with his daughter.  She has expressed concerns about his ability to care for himself at home.  She is new to becoming involved in his care.  In talking with the patient there is somewhat of a disagreement there.  No fever no chills no chest pain no shortness of breath no current abdominal pain no nausea no vomiting no black or bloody stools or change to stool no urinary symptoms.  Patient denying additional symptoms at this time.      REVIEW OF SYSTEMS   Review of Systems   Denies chest pain shortness of breath abdominal pain or other symptomatology    PAST MEDICAL HISTORY:  Past Medical History:   Diagnosis Date    Antiplatelet or antithrombotic long-term use     Arthritis     osteoarthritis    Atrial fibrillation (H)     Bilateral chronic knee pain     CAD (coronary artery disease)     Hypotension, unspecified hypotension type     Irregular heart beat        PAST SURGICAL HISTORY:  Past Surgical History:   Procedure Laterality Date    ORTHOPEDIC SURGERY      TKA           CURRENT MEDICATIONS:    acetaminophen (TYLENOL) 325 MG tablet  calcium carbonate (TUMS) 500 MG chewable tablet  nitroGLYcerin (NITROSTAT) 0.4 MG sublingual tablet  polyethylene glycol (MIRALAX) 17 GM/Dose powder  travoprost NURY FREE (TRAVATAN Z) 0.004 % ophthalmic solution  warfarin ANTICOAGULANT (COUMADIN) 5 MG tablet        ALLERGIES:  No Known Allergies    FAMILY HISTORY:  No family history on file.    SOCIAL HISTORY:   Social  History     Socioeconomic History    Marital status:    Tobacco Use    Smoking status: Former     Packs/day: 1.00     Years: 10.00     Additional pack years: 0.00     Total pack years: 10.00     Types: Cigarettes    Smokeless tobacco: Never   Substance and Sexual Activity    Alcohol use: Never    Drug use: Never       VITALS:  /54   Pulse 79   Temp 97.6  F (36.4  C) (Temporal)   Resp 16   Wt 74.8 kg (165 lb)   SpO2 96%   BMI 23.01 kg/m      PHYSICAL EXAM    PHYSICAL EXAM    Constitutional: Well developed, Well nourished, NAD  HENT: Normocephalic, Atraumatic, Bilateral external ears normal, Oropharynx normal, mucous membranes moist, Nose normal. Neck-  Normal range of motion, No tenderness, Supple, No stridor.   Eyes: PERRL, EOMI, Conjunctiva normal, No discharge.   Respiratory: Normal breath sounds, No respiratory distress, No wheezing, Speaks full sentences easily. No cough.   Cardiovascular: Normal heart rate, Regular rhythm, No murmurs Chest wall nontender.    GI:  Soft, No tenderness, No masses, No flank tenderness. No rebound or guarding.  : No cva tenderness    Musculoskeletal: 2+ DP pulses. No edema. No cyanosis. Good range of motion in all major joints. No tenderness to palpation. No tenderness of the CTLS spine.   Integument: Warm, Dry, No erythema, No rash. No petechiae.   Neurologic: Alert & oriented x 3, Normal motor function, Normal sensory function, No focal deficits noted.   Psychiatric: Affect normal, Judgment normal, Mood normal. Cooperative.        LAB:  All pertinent labs reviewed and interpreted.  Results for orders placed or performed during the hospital encounter of 01/13/24   CT Abdomen Pelvis w Contrast    Impression    IMPRESSION:   1.  No visualized explanation for patient's abdominal pain.  2.  Subtle interlobular septal thickening in the lung bases, differential includes mild pulmonary edema and fibrosis.   Comprehensive metabolic panel   Result Value Ref Range     Sodium 137 135 - 145 mmol/L    Potassium 4.3 3.4 - 5.3 mmol/L    Carbon Dioxide (CO2) 29 22 - 29 mmol/L    Anion Gap 7 7 - 15 mmol/L    Urea Nitrogen 18.6 8.0 - 23.0 mg/dL    Creatinine 0.65 (L) 0.67 - 1.17 mg/dL    GFR Estimate 90 >60 mL/min/1.73m2    Calcium 9.8 8.8 - 10.2 mg/dL    Chloride 101 98 - 107 mmol/L    Glucose 96 70 - 99 mg/dL    Alkaline Phosphatase 42 40 - 150 U/L    AST 27 0 - 45 U/L    ALT 25 0 - 70 U/L    Protein Total 6.4 6.4 - 8.3 g/dL    Albumin 4.0 3.5 - 5.2 g/dL    Bilirubin Total 0.8 <=1.2 mg/dL   Result Value Ref Range    Magnesium 1.9 1.7 - 2.3 mg/dL   Result Value Ref Range    Lipase 27 13 - 60 U/L   Lactic acid whole blood   Result Value Ref Range    Lactic Acid 1.4 0.7 - 2.0 mmol/L   Result Value Ref Range    Troponin T, High Sensitivity 47 (H) <=22 ng/L   CBC with platelets and differential   Result Value Ref Range    WBC Count 9.0 4.0 - 11.0 10e3/uL    RBC Count 3.73 (L) 4.40 - 5.90 10e6/uL    Hemoglobin 12.9 (L) 13.3 - 17.7 g/dL    Hematocrit 39.0 (L) 40.0 - 53.0 %     (H) 78 - 100 fL    MCH 34.6 (H) 26.5 - 33.0 pg    MCHC 33.1 31.5 - 36.5 g/dL    RDW 16.7 (H) 10.0 - 15.0 %    Platelet Count 330 150 - 450 10e3/uL    % Neutrophils 84 %    % Lymphocytes 5 %    % Monocytes 7 %    % Eosinophils 3 %    % Basophils 1 %    % Immature Granulocytes 0 %    NRBCs per 100 WBC 0 <1 /100    Absolute Neutrophils 7.6 1.6 - 8.3 10e3/uL    Absolute Lymphocytes 0.5 (L) 0.8 - 5.3 10e3/uL    Absolute Monocytes 0.6 0.0 - 1.3 10e3/uL    Absolute Eosinophils 0.2 0.0 - 0.7 10e3/uL    Absolute Basophils 0.1 0.0 - 0.2 10e3/uL    Absolute Immature Granulocytes 0.0 <=0.4 10e3/uL    Absolute NRBCs 0.0 10e3/uL   Extra Blue Top Tube   Result Value Ref Range    Hold Specimen JIC    Result Value Ref Range    INR 1.99 (H) 0.85 - 1.15   UA with Microscopic reflex to Culture    Specimen: Urine, Midstream   Result Value Ref Range    Color Urine Light Yellow Colorless, Straw, Light Yellow, Yellow    Appearance Urine  Clear Clear    Glucose Urine Negative Negative mg/dL    Bilirubin Urine Negative Negative    Ketones Urine Negative Negative mg/dL    Specific Gravity Urine 1.010 1.005 - 1.030    Blood Urine Negative Negative    pH Urine 5.0 5.0 - 7.0    Protein Albumin Urine Negative Negative mg/dL    Urobilinogen Urine <2.0 <2.0 mg/dL    Nitrite Urine Negative Negative    Leukocyte Esterase Urine Negative Negative    Mucus Urine Present (A) None Seen /LPF    RBC Urine 1 <=2 /HPF    WBC Urine <1 <=5 /HPF   Troponin T, High Sensitivity (now)   Result Value Ref Range    Troponin T, High Sensitivity 48 (H) <=22 ng/L     RADIOLOGY:  Reviewed all pertinent imaging. Please see official radiology report.  CT Abdomen Pelvis w Contrast   Final Result   IMPRESSION:    1.  No visualized explanation for patient's abdominal pain.   2.  Subtle interlobular septal thickening in the lung bases, differential includes mild pulmonary edema and fibrosis.        EKG:    Performed at: 1532  Atrial fibrillation  Controlled ventricular response  ST segments per nonischemic  No ectopy  Intervals normal  In comparison to ECG from January 5, 2024 no change appreciated  Clinical impression: Atrial fibrillation with controlled ventricular response no change compared to previous    I have independently reviewed and interpreted the EKG(s) documented above.    Gustavo Moon MD  [facility] EMERGENCY ROOM  [address] [address] [phone]       Gustavo Moon MD  01/13/24 1938

## 2024-01-13 NOTE — PROGRESS NOTES
Care Management Follow Up    Length of Stay (days): 0    Expected Discharge Date:  01/13     Concerns to be Addressed:  Transitioning to daughter Georgina's home and needing home care services coordinated     Anticipated Discharge Disposition:  Home, Home care services     Anticipated Discharge Services:  Continued home care services    Anticipated Discharge DME:  None    Education Provided on the Discharge Plan:  Pt/daughter informed that current home care vendor will be notified of the transition to daughter's home in [place]    Patient/Family in Agreement with the Plan:  Yes - Writer spoke with pt/daughter    Private pay costs discussed: transportation costs    Additional Information:  Writer was asked to assist with coordination of pt's transition to his daughter's home and communication with current home care provider. Daughter Georgina states she's concerned for pt's health if left home alone and unable to recognize when it's appropriate to call for help medically. Georgina insists pt discharge to her home in [place]. Pt is onboard with transition to Georgina's home. Pt is currently open to in-home services with [facility]writer has faxed them Georgina's address to coordinate home care in the area. Pt requested to add Georgina's name and number to his chart and make her the primary contact - they had a falling out and are now speaking.     Pt and Georgina state wishing to use [facility] at time of discharge as her home is ~30 miles away and her vehicle is unreliable. The address is-  [address], [place] [address]    No further CM needs identified.    Lavell Azar RN

## 2024-01-13 NOTE — ED NOTES
Helped pt to the bathroom for a UA, he was unable to void or have a bowel movement. There were large amounts of old stool on the pt's underwear but he did not want to put a brief on.

## 2024-01-14 PROBLEM — Z79.01 CHRONIC ANTICOAGULATION: Status: ACTIVE | Noted: 2024-01-01

## 2024-01-14 PROBLEM — Z91.81 AT RISK FOR FALLS: Status: ACTIVE | Noted: 2024-01-01

## 2024-01-14 PROBLEM — Z86.59 HISTORY OF DEMENTIA: Status: ACTIVE | Noted: 2024-01-01

## 2024-01-14 NOTE — DISCHARGE INSTRUCTIONS
Your workup is very reassuring.  Recommend you continue current medications.  INR is 1.99 she can resume your Coumadin medication.  Rest and fluids.  Please follow-up with your primary care doctor in the next couple of days.  If you have escalation your symptoms develop additional concern return to the emergency department repeat assessment.

## 2024-01-14 NOTE — ED PROVIDER NOTES
History     Chief Complaint   Patient presents with    failure to thrive    Social Work Services     HPI  Liz Shankar is a 89 year old male who presents by EMS with need for placement with concern for failure to thrive.    Patient has a history of atrial fibrillation on warfarin history of weakness and recurrent falls.    Patient's current prescribed medications were reviewed.    On examination patient reports his daughter Georgina Terrell called EMS reporting that she could not care for him because of his urinary frequency since his evaluation in the ED at [facility] yesterday.  Patient reports he is present because he is having abdominal discomfort.  He reports he lives alone in a home in [place].  His daughter was concerned that he is not safe to be home alone because he has a split-level home.  He reports he was up urinating a lot last night.    History from daughter- Georgina Mascorro by phone is that she can no longer take care of her stepfather.  She reports she was adopted at age 4 and father disowned her when her mother  at age 55.  She reports she has had no contact with the patient for about 10 years until a week ago when his neighbor had to leave town and she got involved in his care.  She reports that because he is on anticoagulationshe was asked to care for him.  She was unable to reach her .  By phone on 2024.  She reports she was tired because the patient was up almost 40 times to urinate last night she is concerned that the patient is at risk for fall due to anticoagulation and that he needs nursing home placement.  Patient has no other family members.     Allergies:  No Known Allergies    Problem List:    Patient Active Problem List    Diagnosis Date Noted    Closed nondisplaced fracture of medial wall of right acetabulum, initial encounter (H) 2022     Priority: Medium    Diarrhea, unspecified type 2022     Priority: Medium    Hypotension due to hypovolemia  07/18/2022     Priority: Medium    Generalized muscle weakness 07/09/2022     Priority: Medium    Atrial fibrillation with RVR (H) 07/09/2022     Priority: Medium    Fall, initial encounter 07/09/2022     Priority: Medium        Past Medical History:    Past Medical History:   Diagnosis Date    Antiplatelet or antithrombotic long-term use     Arthritis     Atrial fibrillation (H)     Bilateral chronic knee pain     CAD (coronary artery disease)     Hypotension, unspecified hypotension type     Irregular heart beat        Past Surgical History:    Past Surgical History:   Procedure Laterality Date    ORTHOPEDIC SURGERY      TKA       Family History:    No family history on file.    Social History:  Marital Status:   [5]  Social History     Tobacco Use    Smoking status: Former     Packs/day: 1.00     Years: 10.00     Additional pack years: 0.00     Total pack years: 10.00     Types: Cigarettes    Smokeless tobacco: Never   Substance Use Topics    Alcohol use: Never    Drug use: Never        Medications:    amoxicillin (AMOXIL) 500 MG tablet  apixaban ANTICOAGULANT (ELIQUIS) 2.5 MG tablet  diclofenac (VOLTAREN) 1 % topical gel  metoprolol succinate ER (TOPROL XL) 50 MG 24 hr tablet  acetaminophen (TYLENOL) 325 MG tablet  calcium carbonate (TUMS) 500 MG chewable tablet  nitroGLYcerin (NITROSTAT) 0.4 MG sublingual tablet  polyethylene glycol (MIRALAX) 17 GM/Dose powder  travoprost NURY FREE (TRAVATAN Z) 0.004 % ophthalmic solution  warfarin ANTICOAGULANT (COUMADIN) 5 MG tablet          Review of Systems   Constitutional:         Fall risk,    HENT: Negative.     Eyes: Negative.    Respiratory: Negative.     Cardiovascular: Negative.    Gastrointestinal: Negative.    Endocrine: Negative.    Genitourinary:  Positive for frequency.   Musculoskeletal: Negative.    Skin: Negative.    Allergic/Immunologic: Negative.    Neurological: Negative.    Hematological: Negative.    Psychiatric/Behavioral: Negative.     All other  "systems reviewed and are negative.      Physical Exam   BP: 109/87  Pulse: 104  Temp: 97  F (36.1  C)  Resp: 20  Height: 180.3 cm (5' 11\")  Weight: 74.8 kg (165 lb)  SpO2: 98 %      Physical Exam  Constitutional:       Appearance: He is not ill-appearing, toxic-appearing or diaphoretic.   HENT:      Head: Normocephalic and atraumatic.      Nose: Nose normal.   Eyes:      Extraocular Movements: Extraocular movements intact.      Pupils: Pupils are equal, round, and reactive to light.   Cardiovascular:      Rate and Rhythm: Normal rate and regular rhythm.   Pulmonary:      Effort: Pulmonary effort is normal. No respiratory distress.      Breath sounds: Normal breath sounds. No stridor. No wheezing, rhonchi or rales.   Chest:      Chest wall: No tenderness.   Musculoskeletal:         General: No swelling, tenderness, deformity or signs of injury.      Cervical back: Normal range of motion and neck supple.      Right lower leg: No edema.      Left lower leg: No edema.   Skin:     Capillary Refill: Capillary refill takes less than 2 seconds.   Neurological:      General: No focal deficit present.      Mental Status: He is alert and oriented to person, place, and time.      Cranial Nerves: No cranial nerve deficit.      Sensory: No sensory deficit.      Motor: No weakness.      Coordination: Coordination normal.      Gait: Gait normal.      Deep Tendon Reflexes: Reflexes normal.   Psychiatric:         Mood and Affect: Mood normal.         Behavior: Behavior normal.         ED Course                 Procedures              Critical Care time:  none             ED medications:none      ED Vitals:  Vitals:    01/14/24 1628 01/14/24 1632   BP: 109/87 109/87   Pulse: 104    Resp: 20    Temp: 97  F (36.1  C)    TempSrc: Oral    SpO2: 98% 99%   Weight: 74.8 kg (165 lb)    Height: 1.803 m (5' 11\")      ED labs and imaging:  Results for orders placed or performed during the hospital encounter of 01/14/24   Basic metabolic panel    "  Status: Abnormal   Result Value Ref Range    Sodium 136 135 - 145 mmol/L    Potassium 4.0 3.4 - 5.3 mmol/L    Chloride 99 98 - 107 mmol/L    Carbon Dioxide (CO2) 25 22 - 29 mmol/L    Anion Gap 12 7 - 15 mmol/L    Urea Nitrogen 13.5 8.0 - 23.0 mg/dL    Creatinine 0.60 (L) 0.67 - 1.17 mg/dL    GFR Estimate >90 >60 mL/min/1.73m2    Calcium 9.1 8.8 - 10.2 mg/dL    Glucose 152 (H) 70 - 99 mg/dL   UA with Microscopic reflex to Culture     Status: Abnormal    Specimen: Urine, Clean Catch   Result Value Ref Range    Color Urine Straw Colorless, Straw, Light Yellow, Yellow    Appearance Urine Clear Clear    Glucose Urine Negative Negative mg/dL    Bilirubin Urine Negative Negative    Ketones Urine Negative Negative mg/dL    Specific Gravity Urine 1.003 1.003 - 1.035    Blood Urine Negative Negative    pH Urine 7.0 5.0 - 7.0    Protein Albumin Urine Negative Negative mg/dL    Urobilinogen Urine Normal Normal, 2.0 mg/dL    Nitrite Urine Negative Negative    Leukocyte Esterase Urine Negative Negative    Bacteria Urine Few (A) None Seen /HPF    RBC Urine 1 <=2 /HPF    WBC Urine 1 <=5 /HPF    Narrative    Urine Culture not indicated   INR     Status: Abnormal   Result Value Ref Range    INR 2.14 (H) 0.85 - 1.15   CBC with platelets and differential     Status: Abnormal   Result Value Ref Range    WBC Count 11.2 (H) 4.0 - 11.0 10e3/uL    RBC Count 3.69 (L) 4.40 - 5.90 10e6/uL    Hemoglobin 12.9 (L) 13.3 - 17.7 g/dL    Hematocrit 39.3 (L) 40.0 - 53.0 %     (H) 78 - 100 fL    MCH 35.0 (H) 26.5 - 33.0 pg    MCHC 32.8 31.5 - 36.5 g/dL    RDW 16.6 (H) 10.0 - 15.0 %    Platelet Count 318 150 - 450 10e3/uL    % Neutrophils 83 %    % Lymphocytes 5 %    % Monocytes 6 %    % Eosinophils 4 %    % Basophils 1 %    % Immature Granulocytes 1 %    NRBCs per 100 WBC 0 <1 /100    Absolute Neutrophils 9.4 (H) 1.6 - 8.3 10e3/uL    Absolute Lymphocytes 0.5 (L) 0.8 - 5.3 10e3/uL    Absolute Monocytes 0.6 0.0 - 1.3 10e3/uL    Absolute  Eosinophils 0.5 0.0 - 0.7 10e3/uL    Absolute Basophils 0.1 0.0 - 0.2 10e3/uL    Absolute Immature Granulocytes 0.1 <=0.4 10e3/uL    Absolute NRBCs 0.0 10e3/uL   CBC with platelets differential     Status: Abnormal    Narrative    The following orders were created for panel order CBC with platelets differential.  Procedure                               Abnormality         Status                     ---------                               -----------         ------                     CBC with platelets and d...[[id number]]  Abnormal            Final result                 Please view results for these tests on the individual orders.          Assessments & Plan (with Medical Decision Making)   Assessment Summary and Clinical Impression: 89-year-old male who presented by EMS with concern about failure to thrive in need of placement.  Patient has a history of recurrent falls, weakness and atrial fibrillation on wafarin. Patient was evaluated yesterday after presenting with abdominal discomfort.  His workup during his visit yesterday revealed a positive troponin otherwise unremarkable CT imaging after reporting some abdominal discomfort.  During his assessment there was a disagreement noted between the patient and his stepdaughter about his ability to live independently. Patient arrived by EMS after his stepdaughter called requesting respite care due to his urinary frequency overnight.  On arrival he was afebrile ,blood pressure was 109/87.  He was tachycardic.  99% on room.  Patient was lucid alert oriented x 3.  He reports he would like to be home but does not drive.  He is concerned that his  daughter does not agree with him that he should be living independently.  After discussion by phone with daughter we agreed to admit to help with placement evaluation with  and respite for family.  Patient is admitted to medicine for placement assessment and for further care.    ED course and plan:  Reviewed the  medical record.  Reviewed visit on 1/13/24.  CT imaging on 1/13/24  With urinary frequency reported since his ED visit yesterday urinalysis was negative for hematuria or infection yesterday. Patient did have a positive troponin during his visit yesterday.  He reported no chest pain on exam    Spoke with Georgina Mascorro at 5.15pm at [phone]. Georgina reports she was hoping for respite. - [facility]- [phone].  We ultimately agreed it was best to admit the patient to help with placement assessment and evaluation in hopes of that care transition  team can speak and connect with his  in the morning.  This will allow her respite    Spoke with AMBER Jackson PA-C- admitting provider at 6.50pm to examination for placement with care transition pending  consult in the morning in hopes that they can connect with patient's primary .  We agreed to obtain repeat labs-CBC, BMP, urinalysis and INR with labs obtained yesterday given ongoing urinary frequency.  Plan is for the admitting team and provider to review patient's lab results.  INR was 1.9 yesterday.      I also 0.1.  White count 11.2.  Hemoglobin 12.9.  Glucose 152.  I will.  6.          Disclaimer: This note consists of symbols derived from keyboarding, dictation and/or voice recognition software. As a result, there may be errors in the script that have gone undetected. Please consider this when interpreting information found in this chart.   I have reviewed the nursing notes.    I have reviewed the findings, diagnosis, plan and need for follow up with the patient.           Medical Decision Making  The patient's presentation was of moderate complexity (history of dementia, concern for fall risk urinary frequency).    The patient's evaluation involved:  ordering and/or review of 2 test(s) in this encounter (diagnostic labs and urinalysis )    The patient's management necessitated moderate risk  (prescription drug management including medications given in the ED).        New Prescriptions    No medications on file       Final diagnoses:   At risk for falls   Chronic anticoagulation   History of dementia       1/14/2024   [facility] EMERGENCY DEPT       Brett Szymanski MD  01/14/24 [id number]

## 2024-01-14 NOTE — PROGRESS NOTES
Discharge instructions reviewed on the phone with daughter via phone. Wheelchair transport setup, Pt and family agreeable. No questions at time of discharge.

## 2024-01-14 NOTE — LETTER
Transition Communication Hand-off for Care Transitions to Next Level of Care Provider    Name: Liz Shankar  : 5/10/1934  MRN #: [id number]  Primary Care Provider: Luis Mcclellan     Primary Clinic: [address], [facility] [address]     Reason for Hospitalization:  Chronic anticoagulation [Z79.01]  At risk for falls [Z91.81]  History of dementia [Z86.59]  Admit Date/Time: 2024  4:21 PM  Discharge Date: 24  Payor Source: Payor: [place] [employer] / Plan: UNITED [employer] MEDICARE ADVANTAGE / Product Type: HMO /     Readmission Assessment Measure (SEAMUS) Risk Score/category: HIGH         Reason for Communication Hand-off Referral: Fragility    Discharge Plan:  [facility] (Admissions Phone: [phone] Main Phone: [phone] Fax: [phone]) for LTC placement     Concern for non-adherence with plan of care:   Y/N NO    Already enrolled in Tele-monitoring program and name of program:  NO  Follow-up specialty is recommended: No    Follow-up plan:  No future appointments.    Any outstanding tests or procedures:  RASHID Balderrama    AVS/Discharge Summary is the source of truth; this is a helpful guide for improved communication of patient story

## 2024-01-14 NOTE — LETTER
Transition Communication Hand-off for Care Transitions to Next Level of Care Provider    Name: Liz Shankar  : 5/10/1934  MRN #: [id number]  Primary Care Provider: Luis Mcclellan     Primary Clinic: [address]  [facility] [address]     Reason for Hospitalization:  Chronic anticoagulation [Z79.01]  At risk for falls [Z91.81]  History of dementia [Z86.59]  Admit Date/Time: 2024  4:21 PM  Discharge Date: 24  Payor Source: Payor: [place] [employer] / Plan: UNITED HE[employer] MEDICARE ADVANTAGE / Product Type: HMO /     Readmission Assessment Measure (SEAMUS) Risk Score/category: Average         Reason for Communication Hand-off Referral: Multiple providers/specialties    Discharge Plan: Home with daughterGeorgina. [facility] ([phone] Fax: ([phone]) Rn and HHA     Concern for non-adherence with plan of care:   Y/N : No    Discharge Needs Assessment:  Needs      Flowsheet Row Most Recent Value   Equipment Currently Used at Home walker, standard   # of Referrals Placed by  Homecare, External Care Coordination          Follow-up plan:  No future appointments.    Any outstanding tests or procedures:        Referrals       Future Labs/Procedures    Home Care Referral     Comments:    Your provider has ordered home health services. If you have not been contacted within 2 days of your discharge please call the selected Home Care agency listed on your Discharge document.  If a Home Care agency is NOT listed, please call [phone].              RASHID Fitzpatrick  [facility] [phone]/ [facility] [phone]  Care Management  AVS/Discharge Summary is the source of truth; this is a helpful guide for improved communication of patient story

## 2024-01-15 PROBLEM — R62.7 ADULT FAILURE TO THRIVE: Status: ACTIVE | Noted: 2022-07-11

## 2024-01-15 PROBLEM — M25.561 CHRONIC PAIN OF BOTH KNEES: Status: ACTIVE | Noted: 2021-09-15

## 2024-01-15 PROBLEM — I50.32 CHRONIC DIASTOLIC CHF (CONGESTIVE HEART FAILURE) (H): Status: ACTIVE | Noted: 2023-02-27

## 2024-01-15 PROBLEM — G89.29 CHRONIC PAIN OF BOTH KNEES: Status: ACTIVE | Noted: 2021-09-15

## 2024-01-15 PROBLEM — I10 ESSENTIAL (PRIMARY) HYPERTENSION: Status: ACTIVE | Noted: 2022-07-11

## 2024-01-15 PROBLEM — M25.562 CHRONIC PAIN OF BOTH KNEES: Status: ACTIVE | Noted: 2021-09-15

## 2024-01-15 PROBLEM — N40.0 BENIGN PROSTATIC HYPERPLASIA WITHOUT LOWER URINARY TRACT SYMPTOMS: Status: ACTIVE | Noted: 2022-07-11

## 2024-01-15 NOTE — MEDICATION SCRIBE - ADMISSION MEDICATION HISTORY
Medication Scribe Admission Medication History    Admission medication history is complete. The information provided in this note is only as accurate as the sources available at the time of the update.    Information Source(s): Family member via phone    Pertinent Information: Spoke with patient's daughter who is taken over care of her father as of recently. Patient has been instructed to hold and discontinue his Warfarin since 01/11 and was to start Eliquis on 01/15. Georgina also stated patient has not taken any other medications while in her care since 01/10/24    Changes made to PTA medication list:  Added: None  Deleted: None  Changed: None    Medication Affordability:       Allergies reviewed with patient and updates made in EHR: yes    Medication History Completed By: Sally Bernard 1/14/2024 6:02 PM    PTA Med List   Medication Sig Note Last Dose    acetaminophen (TYLENOL) 325 MG tablet Take 2 tablets (650 mg) by mouth every 4 hours as needed for mild pain, other, fever or headaches (and adjunct with moderate or severe pain or per patient request)  Unknown at prn    amoxicillin (AMOXIL) 500 MG tablet Take 4 tablets by mouth as needed TAKE 4 TABLET BY MOUTH 1 HOUR PRIOR TO DENTAL APPOINTMENT  Unknown at prn    apixaban ANTICOAGULANT (ELIQUIS) 2.5 MG tablet Take 2.5 mg by mouth 2 times daily  Unknown at not started    bismuth subsalicylate (PEPTO BISMOL) 262 MG/15ML suspension Take 15 mLs by mouth every 6 hours as needed for indigestion  Unknown at prn    calcium carbonate (TUMS) 500 MG chewable tablet Take 1 chew tab by mouth 2 times daily as needed for heartburn  Unknown at prn    diclofenac (VOLTAREN) 1 % topical gel Apply 2 g topically 4 times daily  Unknown    docusate sodium (COLACE) 100 MG tablet Take 100 mg by mouth daily as needed for constipation  Unknown at prn    metoprolol succinate ER (TOPROL XL) 50 MG 24 hr tablet Take 1 tablet by mouth daily  Past Week    nitroGLYcerin (NITROSTAT) 0.4 MG  sublingual tablet Place 0.4 mg under the tongue every 5 minutes as needed  Unknown at on hand    polyethylene glycol (MIRALAX) 17 GM/Dose powder Take 17 g (1 Capful) by mouth daily as needed for constipation (Stop if you are having diarrhea.)  Unknown at prn    warfarin ANTICOAGULANT (COUMADIN) 5 MG tablet Take 5-7.5 mg by mouth daily To be held 01/11, 01/12, 01/13, and 01/14 1/14/2024: On hold Unknown

## 2024-01-15 NOTE — PROGRESS NOTES
[facility]           Assessment & Plan          Liz Shankar is a 89 year old male admitted on 1/14/2024. He brought in by daughter due to weakness, frequent urination and concerns that she cannot manage him at home.  He does not drive.       FAILURE TO THRIVE  Daughter brought in because she felt like she needed a break and was worried about being able to care for him.  Patient did well with physical therapy, is independent with walker and did not have TCU needs.  Patient ok for discharge per physical therapy/occupational therapy.    Does have mild dementia as below, but appears to have good insight at present into his condition and plan.    Daughter Georgina is willing to take him home with her but cannot do so until Tues AM at 8.    Patient agreeable to going home with chris. Per daughter and per home care there are significant concerns about the condition of his home if he decides to return to his own home without his daughter.    Plan for discharge home with daughter AM 1/16.  HC will continue (reordered in case needed with transition to [facility]) and [facility]  plan to coordinate to provide assistance as able.         FREQUENT URINATION  Known history of BPH  No dysuria and UA was negative.  Post void residual 100.     UA negative.    - patient did not have frequent urination noted here overnight and says this is not a common problem for him.  For now, we'll hold off on adding a medication, but recommend follow-up with primary care provider and perhaps with urology to assess if this is an ongoing problem and to treat it as needed if so.        DEMENTIA  On problem list but not clear how advanced.  No report of any changes in mental status.    Patient pretty clear and good here.  No confusion.  SLUMS 24, consistent with mild dementia.     Chronic atrial fibrillation on anticoagulation   On apixaban and metoprolol.  Good rate control here.   Continued   - patient says not falling more than once per month, at present benefit probably outweighs risk but will need to continue to monitor risk/benefit of this and reassess with primary care provider and cardiology at follow-up.       Essential hypertension   Blood pressure low-normal, continue metoprolol for now, but may need to reassesses going forward if blood pressure too low on this in context of aortic stenosis.      Decreased metoprolol to 25 mg from 50 mg starting 1/16.  Reassess at follow-up with primary care provider.       Chronic systolic heart failure  Severe Aortic stenosis   Does not look like exacerbation, but last echo from our records in 9/2022 showed LVEF 40-45%, mildly decreased RV function, moderate to severe low flow low gradient aortic stenosis.. care everywhere shows echo from 1/5/23 which showed severe aortic stenosis, LVEF 40-45%, global hypokinesis.    - he was last seen by cardiology in 2/15/23 at which point they recommended TAVR. He had previously already been approved but then changed his mind.  Decided at 2/15 visit that he wasn't having significant symptoms so no longer wanted to pursue TAVR unless his symptoms worsened.   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    At baseline .  Does limit mobility but able to get around with a walker.     Continued home prn tylenol and diclofenac gel.       History of constipation   Continue home prn medications      Diet: Advance Diet as Tolerated: Clear Liquid Diet    DVT Prophylaxis: DOAC  Felipe Catheter: Not present  Lines: None     Cardiac Monitoring: None  Code Status:  full and son or daughter can act as POA        Clinically Significant Risk Factors Present on Admission                # Drug Induced Coagulation Defect: home medication list includes an anticoagulant medication                    Diet  Orders Placed This Encounter      Advance Diet as Tolerated: Regular Diet Adult                      Disposition Plan      Daughter will  at 8 am on 1/16.                  Zander Danielle MD, MD  Hospitalist Service  [facility]  Securely message with the Vocera Web Console (learn more here)  Text page via [employer] Paging/Directory             Interval History:   No new concerns.  Patient awake and alert and appropriate.  Communicates well and clearly. Oriented to being in the hospital although wasn't sure which one, and to the month, year and within 1 day of the exact date.  Denies any fever or chills. No pain.  Says he's felt good recently, no increased dyspnea or trouble with activity.  No increased weakness.  Says he does fall on occasion, usually tripping over something, never blacking out . Thinks maybe once per month at most.  Last fall was about 3 weeks ago, no injury from that.  Does say he's been living with his daughter for the past week, sounds like started because a neighbor that helps him was going out of town and then because his daughter says that his home is so dirty and unkept that it is unlivable.  Home care had also expressed concerns about his living condition.    Patient says he feels at baseline 1/15 but is fine staying overnight and going home with his daughter tomorrow. He is ok staying with his daughter for now, which she is also on board with.                 Review of Systems:    ROS: 10 point ROS neg other than the symptoms noted above in the HPI. .          Medications:   Current active medications and PTA medications reviewed, see medication list for details.            Physical Exam:   Vitals were reviewed  Patient Vitals for the past 24 hrs:   BP Temp Temp src Pulse Resp SpO2 Height Weight   01/15/24 1133 (!) 97/33 -- -- 63 -- -- -- --   01/15/24 1132 (!) 77/37 97.3  F (36.3  C) Oral 66 18 98 % -- --   01/15/24 0834 101/52 98.3  F  "(36.8  C) Oral 83 18 -- -- --   01/15/24 0319 106/47 97.8  F (36.6  C) Oral 90 16 98 % -- --   01/15/24 0040 99/83 98  F (36.7  C) Oral 85 18 96 % -- --   24 95/71 97.8  F (36.6  C) Oral 79 18 96 % -- --   24 1748 118/53 -- -- -- -- 97 % -- --   24 1738 -- -- -- -- -- 99 % -- --   24 1728 -- -- -- -- -- 98 % -- --   24 1718 -- -- -- -- -- 97 % -- --   24 1708 -- -- -- -- -- 97 % -- --   24 1658 106/42 -- -- -- -- 99 % -- --   24 1632 109/87 -- -- -- -- 99 % -- --   24 1628 109/87 97  F (36.1  C) Oral 104 20 98 % 1.803 m (5' 11\") 74.8 kg (165 lb)       Temperatures:  Current - Temp: 97.3  F (36.3  C); Max - Temp  Av.7  F (36.5  C)  Min: 97  F (36.1  C)  Max: 98.3  F (36.8  C)  Respiration range: Resp  Av  Min: 16  Max: 20  Pulse range: Pulse  Av.4  Min: 63  Max: 104  Blood pressure range: Systolic (24hrs), Av , Min:77 , Max:118   ; Diastolic (24hrs), Av, Min:33, Max:87    Pulse oximetry range: SpO2  Av.7 %  Min: 96 %  Max: 99 %  No intake/output data recorded.  No intake or output data in the 24 hours ending 01/15/24 1232  EXAM:  General: awake and alert, NAD, oriented x 2-3 as above  Head: normocephalic  Neck: unremarkable, no lymphadenopathy   HEENT: oropharynx pink and moist    Heart: Regular rate and rhythm, no murmurs, rubs, or gallops  Lungs: clear to auscultation bilaterally with good air movement throughout  Abdomen: soft, non-tender, no masses or organomegaly  Extremities: no edema in lower extremities   Skin unremarkable as visualized.               Data:     Reviewed data:  Results for orders placed or performed during the hospital encounter of 24 (from the past 24 hour(s))   UA with Microscopic reflex to Culture    Specimen: Urine, Clean Catch   Result Value Ref Range    Color Urine Straw Colorless, Straw, Light Yellow, Yellow    Appearance Urine Clear Clear    Glucose Urine Negative Negative mg/dL    Bilirubin " Urine Negative Negative    Ketones Urine Negative Negative mg/dL    Specific Gravity Urine 1.003 1.003 - 1.035    Blood Urine Negative Negative    pH Urine 7.0 5.0 - 7.0    Protein Albumin Urine Negative Negative mg/dL    Urobilinogen Urine Normal Normal, 2.0 mg/dL    Nitrite Urine Negative Negative    Leukocyte Esterase Urine Negative Negative    Bacteria Urine Few (A) None Seen /HPF    RBC Urine 1 <=2 /HPF    WBC Urine 1 <=5 /HPF    Narrative    Urine Culture not indicated   CBC with platelets differential    Narrative    The following orders were created for panel order CBC with platelets differential.  Procedure                               Abnormality         Status                     ---------                               -----------         ------                     CBC with platelets and d...[[id number]]  Abnormal            Final result                 Please view results for these tests on the individual orders.   Basic metabolic panel   Result Value Ref Range    Sodium 136 135 - 145 mmol/L    Potassium 4.0 3.4 - 5.3 mmol/L    Chloride 99 98 - 107 mmol/L    Carbon Dioxide (CO2) 25 22 - 29 mmol/L    Anion Gap 12 7 - 15 mmol/L    Urea Nitrogen 13.5 8.0 - 23.0 mg/dL    Creatinine 0.60 (L) 0.67 - 1.17 mg/dL    GFR Estimate >90 >60 mL/min/1.73m2    Calcium 9.1 8.8 - 10.2 mg/dL    Glucose 152 (H) 70 - 99 mg/dL   INR   Result Value Ref Range    INR 2.14 (H) 0.85 - 1.15   CBC with platelets and differential   Result Value Ref Range    WBC Count 11.2 (H) 4.0 - 11.0 10e3/uL    RBC Count 3.69 (L) 4.40 - 5.90 10e6/uL    Hemoglobin 12.9 (L) 13.3 - 17.7 g/dL    Hematocrit 39.3 (L) 40.0 - 53.0 %     (H) 78 - 100 fL    MCH 35.0 (H) 26.5 - 33.0 pg    MCHC 32.8 31.5 - 36.5 g/dL    RDW 16.6 (H) 10.0 - 15.0 %    Platelet Count 318 150 - 450 10e3/uL    % Neutrophils 83 %    % Lymphocytes 5 %    % Monocytes 6 %    % Eosinophils 4 %    % Basophils 1 %    % Immature Granulocytes 1 %    NRBCs per 100 WBC 0 <1 /100     Absolute Neutrophils 9.4 (H) 1.6 - 8.3 10e3/uL    Absolute Lymphocytes 0.5 (L) 0.8 - 5.3 10e3/uL    Absolute Monocytes 0.6 0.0 - 1.3 10e3/uL    Absolute Eosinophils 0.5 0.0 - 0.7 10e3/uL    Absolute Basophils 0.1 0.0 - 0.2 10e3/uL    Absolute Immature Granulocytes 0.1 <=0.4 10e3/uL    Absolute NRBCs 0.0 10e3/uL   INR   Result Value Ref Range    INR 1.86 (H) 0.85 - 1.15   Extra Purple Top EDTA (LAB USE ONLY)   Result Value Ref Range    Hold Specimen JIC    Extra Green Top Tube (LAB USE ONLY)   Result Value Ref Range    Hold Specimen JIC          Attestation:  I have reviewed today's vital signs, notes, medications, labs and imaging.  Amount of time spent managing this patient today:  65 minutes including discussion with patient x 2, detailed discussion with daughter Georgian and discussion with physical therapy, occupational therapy and care transitions multiple times as well.     Zander Danielle MD

## 2024-01-15 NOTE — PLAN OF CARE
Problem: Adult Inpatient Plan of Care  Goal: Absence of Hospital-Acquired Illness or Injury  Intervention: Identify and Manage Fall Risk  Recent Flowsheet Documentation  Taken 1/15/2024 0930 by Nohemi Scanlon, RN  Safety Promotion/Fall Prevention:   activity supervised   mobility aid in reach   nonskid shoes/slippers when out of bed  Taken 1/15/2024 0400 by Nohemi Scanlon, RN  Safety Promotion/Fall Prevention:   activity supervised   mobility aid in reach   nonskid shoes/slippers when out of bed  Goal: Optimal Comfort and Wellbeing  Outcome: Progressing  Intervention: Monitor Pain and Promote Comfort  Flowsheets (Taken 1/15/2024 1127)  Pain Management Interventions:   declines   distraction  Intervention: Provide Person-Centered Care  Flowsheets (Taken 1/15/2024 0614)  Trust Relationship/Rapport:   care explained   questions answered   choices provided   emotional support provided   questions encouraged   thoughts/feelings acknowledged       Goal Outcome Evaluation: Pt alert and oriented with intermittent confusion. Pt getting up to use restroom with SBA and walker. Pt very pleasant and denies any pain. PT needing reminding to use call light for assistance. Plan is for LTC placement.

## 2024-01-15 NOTE — DISCHARGE SUMMARY
[facility] Discharge Summary    Liz Shankar MRN# [id number]   Age: 89 year old YOB: 1934     Date of Admission:  1/14/2024  Date of Discharge::  1/15/2024  Admitting Physician:  Zander Danielle MD  Discharge Physician:  Zander Danielle MD             Admission Diagnoses:   Chronic anticoagulation [Z79.01]  At risk for falls [Z91.81]  History of dementia [Z86.59]          Principle Discharge Diagnosis:       FAILURE TO THRIVE    See hospital course for further active diagnoses addressed during this admission.              Medications Prior to Admission:     Medications Prior to Admission   Medication Sig Dispense Refill Last Dose    acetaminophen (TYLENOL) 325 MG tablet Take 2 tablets (650 mg) by mouth every 4 hours as needed for mild pain, other, fever or headaches (and adjunct with moderate or severe pain or per patient request)   Unknown at prn    amoxicillin (AMOXIL) 500 MG tablet Take 4 tablets by mouth as needed TAKE 4 TABLET BY MOUTH 1 HOUR PRIOR TO DENTAL APPOINTMENT   Unknown at prn    apixaban ANTICOAGULANT (ELIQUIS) 2.5 MG tablet Take 2.5 mg by mouth 2 times daily   Unknown at not started    bismuth subsalicylate (PEPTO BISMOL) 262 MG/15ML suspension Take 15 mLs by mouth every 6 hours as needed for indigestion   Unknown at prn    calcium carbonate (TUMS) 500 MG chewable tablet Take 1 chew tab by mouth 2 times daily as needed for heartburn   Unknown at prn    diclofenac (VOLTAREN) 1 % topical gel Apply 2 g topically 4 times daily   Unknown    docusate sodium (COLACE) 100 MG tablet Take 100 mg by mouth daily as needed for constipation   Unknown at prn    nitroGLYcerin (NITROSTAT) 0.4 MG sublingual tablet Place 0.4 mg under the tongue every 5 minutes as needed   Unknown at on hand    polyethylene glycol (MIRALAX) 17 GM/Dose powder Take 17 g (1 Capful) by mouth daily as needed for constipation (Stop if you are having diarrhea.) 170 g 0 Unknown at prn    [DISCONTINUED] metoprolol  succinate ER (TOPROL XL) 50 MG 24 hr tablet Take 1 tablet by mouth daily   Past Month    [DISCONTINUED] warfarin ANTICOAGULANT (COUMADIN) 5 MG tablet Take 5-7.5 mg by mouth daily To be held 01/11, 01/12, 01/13, and 01/14   Unknown             Discharge Medications:     Current Discharge Medication List        CONTINUE these medications which have CHANGED    Details   metoprolol succinate ER (TOPROL XL) 25 MG 24 hr tablet Take 1 tablet (25 mg) by mouth daily  Qty: 30 tablet, Refills: 0    Associated Diagnoses: Essential (primary) hypertension           CONTINUE these medications which have NOT CHANGED    Details   acetaminophen (TYLENOL) 325 MG tablet Take 2 tablets (650 mg) by mouth every 4 hours as needed for mild pain, other, fever or headaches (and adjunct with moderate or severe pain or per patient request)    Associated Diagnoses: Closed nondisplaced fracture of medial wall of right acetabulum, initial encounter (H)      amoxicillin (AMOXIL) 500 MG tablet Take 4 tablets by mouth as needed TAKE 4 TABLET BY MOUTH 1 HOUR PRIOR TO DENTAL APPOINTMENT      apixaban ANTICOAGULANT (ELIQUIS) 2.5 MG tablet Take 2.5 mg by mouth 2 times daily      bismuth subsalicylate (PEPTO BISMOL) 262 MG/15ML suspension Take 15 mLs by mouth every 6 hours as needed for indigestion      calcium carbonate (TUMS) 500 MG chewable tablet Take 1 chew tab by mouth 2 times daily as needed for heartburn      diclofenac (VOLTAREN) 1 % topical gel Apply 2 g topically 4 times daily      docusate sodium (COLACE) 100 MG tablet Take 100 mg by mouth daily as needed for constipation      nitroGLYcerin (NITROSTAT) 0.4 MG sublingual tablet Place 0.4 mg under the tongue every 5 minutes as needed      polyethylene glycol (MIRALAX) 17 GM/Dose powder Take 17 g (1 Capful) by mouth daily as needed for constipation (Stop if you are having diarrhea.)  Qty: 170 g, Refills: 0           STOP taking these medications       warfarin ANTICOAGULANT (COUMADIN) 5 MG tablet  Comments:   Reason for Stopping:                          Brief History of Illness from time of admission:     From Admission H+P:   Liz Shankar is a 89 year old male who presents by EMS with need for placement with concern for failure to thrive.     Patient has a history of atrial fibrillation on warfarin history of weakness and recurrent falls.  Patient reports his daughter Georgina Terrell called EMS reporting that she could not care for him because of his urinary frequency since his evaluation in the ED yesterday (where he was seen for abdominal discomfort).  He lives alone in a home. His daughter was concerned that he is not safe to be home alone because he has a split-level home.  He reports he was up urinating a lot last night.     History from daughter, Georgina Mascorro, by phone is that she can no longer take care of her stepfather.  She reports she was adopted at age 4 and father disowned her when her mother  at age 55.  She has had no contact with the patient for about 10 years until a week ago when his neighbor had to leave town and she got involved in his care.  She reports that because he is on anticoagulationshe was asked to care for him.  She was unable to reach his , by phone, on 2024.  She reports she was tired because the patient was up almost 40 times to urinate last night she is concerned that the patient is at risk for fall due to anticoagulation and that he needs nursing home placement.  Patient has no other family members.             TODAY:     See progress note     Hospital Course:        Liz Shankar is a 89 year old male admitted on 2024. He brought in by daughter due to weakness, frequent urination and concerns that she cannot manage him at home.  He does not drive.       FAILURE TO THRIVE  Daughter brought in because she felt like she needed a break and was worried about being able to care for him.  Patient did well with physical therapy, is independent with  walker and did not have TCU needs.  Patient ok for discharge per physical therapy/occupational therapy.    Does have mild dementia as below, but appears to have good insight at present into his condition and plan.    Daughter Georgina is willing to take him home with her but cannot do so until Tues AM at 8.    Patient agreeable to going home with chris. Per daughter and per home care there are significant concerns about the condition of his home if he decides to return to his own home without his daughter.    Plan for discharge home with daughter AM 1/16.  HC will continue (reordered in case needed with transition to [facility]) and [facility]  plan to coordinate to provide assistance as able.          FREQUENT URINATION  Known history of BPH  No dysuria and UA was negative.  Post void residual 100.     UA negative.    - patient did not have frequent urination noted here overnight and says this is not a common problem for him.  For now, we'll hold off on adding a medication, but recommend follow-up with primary care provider and perhaps with urology to assess if this is an ongoing problem and to treat it as needed if so.        DEMENTIA  On problem list but not clear how advanced.  No report of any changes in mental status.    Patient pretty clear and good here.  No confusion.  SLUMS 24, consistent with mild dementia.      Chronic atrial fibrillation on anticoagulation   On apixaban and metoprolol.  Good rate control here.  Continued   - patient says not falling more than once per month, at present benefit probably outweighs risk but will need to continue to monitor risk/benefit of this and reassess with primary care provider and cardiology at follow-up.       Essential hypertension   Blood pressure low-normal, continue metoprolol for now, but may need to reassesses going forward if blood pressure too low on this in context of aortic stenosis.      Decreased metoprolol to 25 mg from 50  mg starting 1/16.  Reassess at follow-up with primary care provider.        Chronic systolic heart failure  Severe Aortic stenosis   Does not look like exacerbation, but last echo from our records in 9/2022 showed LVEF 40-45%, mildly decreased RV function, moderate to severe low flow low gradient aortic stenosis.. care everywhere shows echo from 1/5/23 which showed severe aortic stenosis, LVEF 40-45%, global hypokinesis.    - he was last seen by cardiology in 2/15/23 at which point they recommended TAVR. He had previously already been approved but then changed his mind.  Decided at 2/15 visit that he wasn't having significant symptoms so no longer wanted to pursue TAVR unless his symptoms worsened.   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    At baseline .  Does limit mobility but able to get around with a walker.     Continued home prn tylenol and diclofenac gel.        History of constipation   Continue home prn medications       Diet: Advance Diet as Tolerated: Clear Liquid Diet    DVT Prophylaxis: DOAC  Felipe Catheter: Not present  Lines: None     Cardiac Monitoring: None  Code Status:  full and son or daughter can act as POA     Plan to discharge to daughter's home AM 1/16.          Discharge Instructions and Follow-Up:      Discharge diet: Orders Placed This Encounter      Advance Diet as Tolerated: Regular Diet Adult      Diet       Discharge activity: Activity as tolerated   Discharge follow-up: Follow-up with provider within 1 week            Discharge Disposition:     Discharging to home       Attestation:  Amount of time performed on this discharge summary: 60 minutes.    Zander Danielle MD

## 2024-01-15 NOTE — PROGRESS NOTES
01/15/24 0857   Appointment Info   Signing Clinician's Name / Credentials (PT) Adwoa Plunkett, PT   Quick Adds   Quick Adds Certification   Living Environment   People in Home child(camila), adult  (daughter)   Current Living Arrangements house   Living Environment Comments pt previously living in split level home alone, recently moved to daughters home, pt states no stairs present   Self-Care   Equipment Currently Used at Home walker, standard   Fall history within last six months no   Activity/Exercise/Self-Care Comment indep with mobility with 2WW, ADL's/IADL's. recently moved in with daughter   General Information   Onset of Illness/Injury or Date of Surgery 01/14/24   Referring Physician Soo Yang MD   Patient/Family Therapy Goals Statement (PT) return home   Pertinent History of Current Problem (include personal factors and/or comorbidities that impact the POC) Liz Shankar is a 89 year old male admitted on 1/14/2024. He brought in by daughter due to urinary frequency.   Cognition   Affect/Mental Status (Cognition) WFL   Orientation Status (Cognition) oriented x 3   Follows Commands (Cognition) WFL   Pain Assessment   Patient Currently in Pain No   Range of Motion (ROM)   Range of Motion ROM is WFL   Strength (Manual Muscle Testing)   Strength (Manual Muscle Testing) strength is WFL   Bed Mobility   Comment, (Bed Mobility) pt sitting independently at EOB   Transfers   Comment, (Transfers) sit>stand from EOB with 2WW and mod I, good standing balance   Gait/Stairs (Locomotion)   [place] Level (Gait) supervision;modified independence   Assistive Device (Gait) walker, front-wheeled   Distance in Feet (Gait) 150   Pattern (Gait) step-through   Comment, (Gait/Stairs) pt stating does not have stairs to complete at home, anticipate no concerns based on ambulation performance   Balance   Balance no deficits were identified   Balance Comments uses walker at home, has good awareness to walker safety   Clinical  Impression   Criteria for Skilled Therapeutic Intervention Evaluation only   PT Diagnosis (PT) assess safe mobility   Influenced by the following impairments none   Functional limitations due to impairments none   Clinical Presentation (PT Evaluation Complexity) stable   Clinical Presentation Rationale clinical reasoning   Clinical Decision Making (Complexity) low complexity   Risk & Benefits of therapy have been explained evaluation/treatment results reviewed;care plan/treatment goals reviewed;risks/benefits reviewed;current/potential barriers reviewed;participants voiced agreement with care plan;participants included;patient   Clinical Impression Comments Pt appearing at his baseline mobility level with use of walker, no concerns re: strength or balance with mobility. Pt is safe to discharge back to daughter's as previous once medically stable.   PT Total Evaluation Time   PT Eval, Low Complexity Minutes (24324) 15   PT Discharge Planning   PT Plan d/c PT, no needs   PT Discharge Recommendation (DC Rec) home   PT Rationale for DC Rec rec return to daughters as previous once medically stable, no further PT needs as pt is at baseline   PT Brief overview of current status amb 150 feet with 2WW and Cesario/mod I   PT Equipment Needed at Discharge   (has walker)   Total Session Time   Total Session Time (sum of timed and untimed services) 15

## 2024-01-15 NOTE — PLAN OF CARE
Problem: Adult Inpatient Plan of Care  Goal: Absence of Hospital-Acquired Illness or Injury  Intervention: Identify and Manage Fall Risk  Recent Flowsheet Documentation  Taken 1/15/2024 0400 by Nohemi Scanlon, RN  Safety Promotion/Fall Prevention:   activity supervised   mobility aid in reach   nonskid shoes/slippers when out of bed  Intervention: Prevent Skin Injury  Recent Flowsheet Documentation  Taken 1/14/2024 2006 by Nohemi Scanlon, RN  Body Position: supine  Goal: Optimal Comfort and Wellbeing  Outcome: Progressing  Intervention: Provide Person-Centered Care  Flowsheets (Taken 1/15/2024 0614)  Trust Relationship/Rapport:   care explained   questions answered   choices provided   emotional support provided   questions encouraged   thoughts/feelings acknowledged       Goal Outcome Evaluation: Pt alert and oriented x2. Pt has some intermittent confusion when remembering which hospital he is in. Pt denies any pain. Pt impulsive to get up to the bathroom several times throughout the shift. Pt reminded end educated to use call light for assistance. Pt using call light off and on throughout when needing to get up to the bathroom.

## 2024-01-15 NOTE — ED NOTES
"[facility]   Admission Handoff    The patient is Liz Shankar, 89 year old who arrived in the ED by AMBULANCE from [facility] with a complaint of failure to thrive and Social Work Services  . The patient's current symptoms are new and during this time the symptoms have remained the same. In the ED, patient was diagnosed with   Final diagnoses:   At risk for falls   Chronic anticoagulation   History of dementia         Needed?: No    Allergies:  No Known Allergies    Past Medical Hx:   Past Medical History:   Diagnosis Date    Antiplatelet or antithrombotic long-term use     Arthritis     osteoarthritis    Atrial fibrillation (H)     Bilateral chronic knee pain     CAD (coronary artery disease)     Hypotension, unspecified hypotension type     Irregular heart beat        Initial vitals were: BP: 109/87  Pulse: 104  Temp: 97  F (36.1  C)  Resp: 20  Height: 180.3 cm (5' 11\")  Weight: 74.8 kg (165 lb)  SpO2: 98 %   Recent vital Signs: /87   Pulse 104   Temp 97  F (36.1  C) (Oral)   Resp 20   Ht 1.803 m (5' 11\")   Wt 74.8 kg (165 lb)   SpO2 99%   BMI 23.01 kg/m      Elimination Status: Continent: Yes     Activity Level: SBA w/ walker    Fall Status: Reason for falls risk:  Mobility  nonskid shoes/slippers when out of bed    Baseline Mental status: WDL  Current Mental Status changes: at basesline    Infection present or suspected this encounter: no  Sepsis suspected: No    Isolation type: none    Bariatric equipment needed?: No    In the ED these meds were given: Medications - No data to display    Drips running?  No    Home pump  No    Current LDAs:   none     Results:   Labs/Imaging  Ordered and Resulted from Time of ED Arrival Up to the Time of Departure from the ED  No results found for this or any previous visit (from the past 24 hour(s)).    For the majority of the shift this patient's behavior was Green     Cardiac Rhythm: N/A  Pt needs tele? No  Skin/wound " Issues:  none    Code Status: Full Code    Pain control: pt had none    Nausea control: pt had none    Abnormal labs/tests/findings requiring intervention: None    Patient tested for COVID 19 prior to admission: NO     OBS brochure/video discussed/provided to patient/family: Yes     Family present during ED course? No     Family Comments/Social Situation comments: Has daughter that lives in [place] who he hasn't seen in 10 years.  Was patient at [facility] and discharged to her house yesterday  Daughter call 911 stating he was up 40 during the night and she needs a break  Patient lives in [place] alone and has neighbor watching him    Tasks needing completion: None    Rosa Maldonado RN

## 2024-01-15 NOTE — PROGRESS NOTES
"[facility] [facility] ADMISSION NOTE    Patient admitted to room 2306 at approximately 2006 via cart from emergency room. Patient was accompanied by transport tech.     Verbal SBAR report received from ED RN prior to patient arrival.     Patient ambulated to bed with stand-by assist. Patient alert and oriented X 4. Pain is controlled without any medications.  . Admission vital signs: Blood pressure 95/71, pulse 79, temperature 97.8  F (36.6  C), temperature source Oral, resp. rate 18, height 1.803 m (5' 11\"), weight 74.8 kg (165 lb), SpO2 96%. Patient was oriented to plan of care, call light, bed controls, tv, telephone, bathroom, and visiting hours.     Risk Assessment    The following safety risks were identified during admission: skin. Yellow risk band applied: NO.     Skin Initial Assessment    This writer admitted this patient and completed a full skin assessment and Owen score in the Adult PCS flowsheet. Appropriate interventions initiated as needed.     Secondary skin check completed by Cristel LEAL RN.    Owen Risk Assessment  Sensory Perception: 4-->no impairment  Moisture: 4-->rarely moist  Activity: 3-->walks occasionally  Mobility: 3-->slightly limited  Nutrition: 3-->adequate  Friction and Shear: 3-->no apparent problem  Owen Score: 20    Education    Patient has a [place] to Observation order: Yes  Observation education completed and documented: Yes      Nohemi Scanlon RN      "

## 2024-01-15 NOTE — CONSULTS
Care Management Initial Consult    General Information  Assessment completed with: Patient, Care Team Member, Children, Georgina  Type of CM/SW Visit: Initial Assessment    Primary Care Provider verified and updated as needed: Yes   Readmission within the last 30 days:        Reason for Consult: discharge planning  Advance Care Planning: Advance Care Planning Reviewed: no concerns identified          Communication Assessment  Patient's communication style: spoken language (English or Bilingual)    Hearing Difficulty or Deaf: no   Wear Glasses or Blind: yes    Cognitive  Cognitive/Neuro/Behavioral: .WDL except, orientation  Level of Consciousness: intermittent confusion  Arousal Level: opens eyes spontaneously                Living Environment:   People in home: alone     Current living Arrangements: house      Able to return to prior arrangements: no  Living Arrangement Comments: has been living with dgtr for one week    Family/Social Support:  Care provided by: self  Provides care for: no one, unable/limited ability to care for self  Marital Status:   Children, Neighbor          Description of Support System: Supportive    Support Assessment: Lacks adequate physical care    Current Resources:   Patient receiving home care services: Yes  Skilled Home Care Services: Skilled Nursing, Physical Therapy, Occupational Therapy  Community Resources: None  Equipment currently used at home: walker, standard  Supplies currently used at home: None    Employment/Financial:  Employment Status: retired        Financial Concerns: none   Referral to Financial Worker: No       Does the patient's insurance plan have a 3 day qualifying hospital stay waiver?  No    Lifestyle & Psychosocial Needs:  Social Determinants of Health     Food Insecurity: Not on file   Depression: Not on file   Housing Stability: Not on file   Tobacco Use: Medium Risk (1/15/2024)    Patient History     Smoking Tobacco Use: Former     Smokeless Tobacco Use:  "Never     Passive Exposure: Not on file   Financial Resource Strain: Not on file   Alcohol Use: Not on file   Transportation Needs: Not on file   Physical Activity: Not on file   Interpersonal Safety: Not on file   Stress: Not on file   Social Connections: Not on file       Functional Status:  Prior to admission patient needed assistance:   Dependent ADLs:: Ambulation-walker  Dependent IADLs:: Medication Management, Transportation       Mental Health Status:  Mental Health Status: No Current Concerns       Chemical Dependency Status:  Chemical Dependency Status: No Current Concerns             Values/Beliefs:  Spiritual, Cultural Beliefs, Anabaptist Practices, Values that affect care: no               Additional Information:  Consult received for discharge planning. Patient is OBS status. Met with patient at bedside for initial assessment. Also gathered information from patient's daughter, Georgina, and Karen [facility] MARU Luu [phone].    Patient has been living in his home in [place] until a week ago. Since last week, patient has been staying with his estranged daughter, Georgina, in her home in [place]. Georgina and patient have not spoken in 5 years. Per Georgina, patient's neighbors who usually set up his medications, called Georgina and asked if she could manage patient's medications while they are out of town. Georgina arrived to the home to assist patient and Georgina states the home is an \"awful condition\". Georgina states there is feces all over the house from a dog that  2 years ago.     Because of the home conditions, Georgina brought patient to her home. Georgina states patient was up every half hour to use the bathroom and she wasn't getting any sleep.  Georgina is wanting patient to wear briefs, but he is refusing.    Georgina plans to take patient home, but states she is getting her home ready and can't take him home until tomorrow morning.     Spoke with Bell, patient's [phone], MARU. Bell states " home care did not have a concern with patient's home until about a week ago when patient's toilet stopped working. [facility] did NOT file a [facility] report due to daughter, Georgina, stepping in to assist patient.  Mariann Jones Care Manager - [phone]    Spoke with Yisel Damon [phone]. Soha states she has only visited with patient once. She will call daughter and direct her to the county of residence that patient will be living in with Georgina for additional services and a [place].    Georgina states she will pick bring patient to her home in the morning. Discussed with attending provider.    RASHID Fitzpatrick  [facility] [phone]/ [facility] [phone]  Care Management

## 2024-01-15 NOTE — PROGRESS NOTES
[facility]  OUTPATIENT OCCUPATIONAL THERAPY  EVALUATION  PLAN OF TREATMENT FOR OUTPATIENT REHABILITATION  (COMPLETE FOR INITIAL CLAIMS ONLY)  Patient's Last Name, First Name, M.I.  YOB: 1934  Liz Shankar                          Provider's Name  [facility] Medical Record No.  [id number]                             Onset Date:  01/14/24   Start of Care Date:  01/15/24   Type:     ___PT   _X_OT   ___SLP Medical Diagnosis:  urinary frequency                    OT Diagnosis:  1x treat for impaired memory Visits from SOC:  1     See note for plan of treatment, functional goals and certification details    I CERTIFY THE NEED FOR THESE SERVICES FURNISHED UNDER        THIS PLAN OF TREATMENT AND WHILE UNDER MY CARE     (Physician co-signature of this document indicates review and certification of the therapy plan).                                01/15/24 0900   Appointment Info   Signing Clinician's Name / Credentials (OT) Charlette Mayo OTR/L   Quick Adds   Quick Adds Certification   Living Environment   People in Home child(camila), adult  (daughter)   Current Living Arrangements house   Living Environment Comments was living home alone, however just moved in with daughter.   Self-Care   Equipment Currently Used at Home walker, standard   Fall history within last six months no   Activity/Exercise/Self-Care Comment At baseline; independent with ADLs and related mobility using walker.   General Information   Onset of Illness/Injury or Date of Surgery 01/14/24   Referring Physician Soo Yang MD   Patient/Family Therapy Goal Statement (OT) to return home.   Additional Occupational Profile Info/Pertinent History of Current Problem Liz Shankar is a 89 year old male admitted on 1/14/2024. He brought in by daughter due to urinary frequency.   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 Lights on/tv off. Points lost on delayed recall (recalls 2/5 words), short story recall (6/8).   Pain Assessment   Patient Currently in Pain No   Strength Comprehensive (MMT)   Comment, General Manual Muscle Testing (MMT) Assessment B UE strength: WFL for ADLs.   Bed Mobility   Comment (Bed Mobility) independent   Transfers   Transfer Comments independent   Balance   Balance Comments steady on his feet using walker.   Activities of Daily Living   BADL Assessment/Intervention upper body dressing;lower body dressing;toileting   Upper Body Dressing Assessment/Training   [place] Level (Upper Body Dressing) independent   Lower Body Dressing Assessment/Training   [place] Level (Lower Body Dressing) independent   Toileting   [place] Level (Toileting) independent   Clinical Impression   Criteria for Skilled Therapeutic Interventions Met (OT) Yes, treatment indicated   OT Diagnosis 1x treat for impaired memory   OT Problem List-Impairments impacting ADL cognition   Assessment of Occupational Performance 1-3 Performance Deficits   Identified Performance Deficits memory deficit.   Planned Therapy Interventions (OT) ADL retraining   Clinical Decision Making Complexity (OT) problem focused assessment/low complexity   Risk & Benefits of therapy have been explained patient;participants included;participants voiced agreement with care plan;current/potential barriers reviewed;risks/benefits reviewed;care plan/treatment goals reviewed;evaluation/treatment results reviewed   OT Total Evaluation Time   OT Cherise Low Complexity Minutes (15780) 10   Therapy Certification   Medical Diagnosis urinary frequency  "  Start of Care Date 01/15/24   Certification date from 01/15/24   Certification date to 01/16/24   OT Goals   Therapy Frequency (OT) One time eval and treatment   OT Predicted Duration/Target Date for Goal Attainment 01/15/24   OT Goals Cognition   OT: Cognitive Patient/caregiver will verbalize understanding of cognitive assessment results/recommendations as needed for safe discharge planning   Interventions   Interventions Quick Adds Self-Care/Home Management   Self-Care/Home Management   Self-Care/Home Mgmt/ADL, Compensatory, Meal Prep Minutes (06632) 10   Symptoms Noted During/After Treatment (Meal Preparation/Planning Training) none   Treatment Detail/Skilled Intervention educated pt on results of SLUMS and implications for IADLs. Pt utilizes medication box and \"sometimes alarms\" educated pt on importance of utilizing alarms and to start this healthy habit in case memory worsens over time. Educated pt on importance of daily cognitive exercises/activity such as puzzles/reading etc. Pt with no further concerns.   OT Discharge Planning   OT Plan 1x treat. no further IP OT needs identified.   OT Discharge Recommendation (DC Rec) home with assist   OT Rationale for DC Rec Pt appears to be at baseline for ADLs and related mobility. Recommend supervision on medications and cooking.   OT Brief overview of current status independent with ADLs. 24/30 on SLUMS   Total Session Time   Timed Code Treatment Minutes 10   Total Session Time (sum of timed and untimed services) 20       "

## 2024-01-15 NOTE — H&P
[facility]    History and Physical - Hospitalist Service       Date of Admission:  1/14/2024    Assessment & Plan      Liz Shankar is a 89 year old male admitted on 1/14/2024. He brought in by daughter due to weakness, fall risk.    FAILURE TO THRIVE  Fall risk and not able to live alone.  Daughter would like to get him placed for his safety.    FREQUENT URINATION  No dysuria but likely has some BPH issues.  Will check PVR    DEMENTIA  On problem list but no clear how confused he is and daughter not available.        Diet: Advance Diet as Tolerated: Clear Liquid Diet    DVT Prophylaxis: DOAC  Felipe Catheter: Not present  Lines: None     Cardiac Monitoring: None  Code Status:  full and son or daughter can act as POA    Clinically Significant Risk Factors Present on Admission               # Drug Induced Coagulation Defect: home medication list includes an anticoagulant medication                    Disposition Plan      Expected Discharge Date: 01/15/2024                  Soo Yang MD  Hospitalist Service  [facility]  Securely message with [employer] (more info)  Text page via [employer] Paging/Directory     ______________________________________________________________________    Chief Complaint   Weakness and FTT     History is obtained from the patient    History of Present Illness   Liz Shankar is a 89 year old male who presents by EMS with need for placement with concern for failure to thrive.     Patient has a history of atrial fibrillation on warfarin history of weakness and recurrent falls.  Patient reports his daughter Georgina Terrell called EMS reporting that she could not care for him because of his urinary frequency since his evaluation in the ED yesterday (where he was seen for abdominal discomfort).  He lives alone in a home. His daughter was concerned that he is not safe to be home alone because he has a split-level home.  He reports he was up  urinating a lot last night.     History from daughter, Georgina Mascorro, by phone is that she can no longer take care of her stepfather.  She reports she was adopted at age 4 and father disowned her when her mother  at age 55.  She has had no contact with the patient for about 10 years until a week ago when his neighbor had to leave town and she got involved in his care.  She reports that because he is on anticoagulationshe was asked to care for him.  She was unable to reach his , by phone, on 2024.  She reports she was tired because the patient was up almost 40 times to urinate last night she is concerned that the patient is at risk for fall due to anticoagulation and that he needs nursing home placement.  Patient has no other family members.       Past Medical History    Past Medical History:   Diagnosis Date    Antiplatelet or antithrombotic long-term use     Arthritis     osteoarthritis    Atrial fibrillation (H)     Bilateral chronic knee pain     CAD (coronary artery disease)     Hypotension, unspecified hypotension type     Irregular heart beat        Past Surgical History   Past Surgical History:   Procedure Laterality Date    ORTHOPEDIC SURGERY      TKA       Prior to Admission Medications   Prior to Admission Medications   Prescriptions Last Dose Informant Patient Reported? Taking?   acetaminophen (TYLENOL) 325 MG tablet Unknown at prn Daughter No Yes   Sig: Take 2 tablets (650 mg) by mouth every 4 hours as needed for mild pain, other, fever or headaches (and adjunct with moderate or severe pain or per patient request)   amoxicillin (AMOXIL) 500 MG tablet Unknown at prn Daughter Yes Yes   Sig: Take 4 tablets by mouth as needed TAKE 4 TABLET BY MOUTH 1 HOUR PRIOR TO DENTAL APPOINTMENT   apixaban ANTICOAGULANT (ELIQUIS) 2.5 MG tablet Unknown at not started Daughter Yes Yes   Sig: Take 2.5 mg by mouth 2 times daily   bismuth subsalicylate (PEPTO BISMOL) 262 MG/15ML suspension Unknown at prn  Daughter Yes Yes   Sig: Take 15 mLs by mouth every 6 hours as needed for indigestion   calcium carbonate (TUMS) 500 MG chewable tablet Unknown at prn Daughter Yes Yes   Sig: Take 1 chew tab by mouth 2 times daily as needed for heartburn   diclofenac (VOLTAREN) 1 % topical gel Unknown Daughter Yes Yes   Sig: Apply 2 g topically 4 times daily   docusate sodium (COLACE) 100 MG tablet Unknown at prn Daughter Yes Yes   Sig: Take 100 mg by mouth daily as needed for constipation   metoprolol succinate ER (TOPROL XL) 50 MG 24 hr tablet Past Month Daughter Yes Yes   Sig: Take 1 tablet by mouth daily   nitroGLYcerin (NITROSTAT) 0.4 MG sublingual tablet Unknown at on hand Daughter Yes Yes   Sig: Place 0.4 mg under the tongue every 5 minutes as needed   polyethylene glycol (MIRALAX) 17 GM/Dose powder Unknown at prn Daughter No Yes   Sig: Take 17 g (1 Capful) by mouth daily as needed for constipation (Stop if you are having diarrhea.)   warfarin ANTICOAGULANT (COUMADIN) 5 MG tablet Unknown Daughter Yes Yes   Sig: Take 5-7.5 mg by mouth daily To be held 01/11, 01/12, 01/13, and 01/14      Facility-Administered Medications: None        Review of Systems    The 10 point Review of Systems is negative other than noted in the HPI or here.     Social History   I have reviewed this patient's social history and updated it with pertinent information if needed.  Social History     Tobacco Use    Smoking status: Former     Packs/day: 1.00     Years: 10.00     Additional pack years: 0.00     Total pack years: 10.00     Types: Cigarettes    Smokeless tobacco: Never   Substance Use Topics    Alcohol use: Never    Drug use: Never         Family History           Allergies   No Known Allergies     Physical Exam   Vital Signs: Temp: 97.8  F (36.6  C) Temp src: Oral BP: 106/47 Pulse: 90   Resp: 16 SpO2: 98 % O2 Device: None (Room air)    Weight: 165 lbs 0 oz    General Appearance: Wdwn man in nad, alert and oriented  Respiratory: lcta, no  dyspnea  Cardiovascular: irr  GI: no abd pain  Skin: no rashes on exposed skin     Medical Decision Making             Data     I have personally reviewed the following data over the past 24 hrs:    11.2 (H)  \   12.9 (L)   / 318     136 99 13.5 /  152 (H)   4.0 25 0.60 (L) \     INR:  2.14 (H) PTT:  N/A   D-dimer:  N/A Fibrinogen:  N/A       Imaging results reviewed over the past 24 hrs:   No results found for this or any previous visit (from the past 24 hour(s)).      As the provider for the telehealth service, I attest that I introduced myself to the patient and provided my credentials. Based on review of the patient s chart and/or a discussion with members of the patient s treatment team, I determined that telemedicine via a real-time, two-way, interactive audio and video platform is an appropriate means of providing this service. The patient and I mutually agreed that this visit is appropriate for telemedicine as well.

## 2024-01-16 NOTE — PLAN OF CARE
Goal Outcome Evaluation:       Can be hypotensive at times, asymptomatic.  Up in room independently with steady gait, denies discomfort at present time.

## 2024-01-16 NOTE — PROGRESS NOTES
[facility]    Medicine Progress Note - Hospitalist Service    Date of Admission:  1/14/2024    Assessment & Plan   Liz Shankar is a 89 year old male admitted on 1/14/2024. He brought in by daughter due to weakness, frequent urination, and concerns that she cannot manage him at home.      Failure to thrive  Unsafe living conditions  Daughter brought in because she felt like she needed a break and was worried about being able to care for him.  Patient did well with physical therapy, is independent with walker and did not have TCU needs. Patient ok for discharge per physical therapy/occupational therapy.  Does have mild dementia as below, but appears to have good insight at present into his condition and plan.    Patient agreeable to going home with dauther. Per daughter and per home care there are significant concerns about the condition of his home if he decides to return to his own home without his daughter.    - Patient's daughter refusing to take patient home and concerned about level of care. Patient's current home sounds to be in unlivable conditions due to sanitation concerns. Unable to discharge home given new circumstances and unable to take care of himself alone.   - LTC referrals placed      Frequent urination  Known history of BPH  No dysuria and UA was negative.  Post void residual 100. UA negative.    - Adult depends in place  - No medications prior, may benefit from Flomax given nocturia and frequency of report urinations  - Trial Flomax 0.4 mg daily     Dementia  On problem list but not clear how advanced.  No report of any changes in mental status.  Patient pretty clear and good here.  No confusion.  SLUMS 24, consistent with mild dementia.      Chronic atrial fibrillation on anticoagulation   On apixaban and metoprolol.  Good rate control here.  Continued   - patient says not falling more than once per month, at present benefit probably outweighs risk but will need to  continue to monitor risk/benefit of this and reassess with primary care provider and cardiology at follow-up.       Essential hypertension   Blood pressure low-normal, continue metoprolol for now, but may need to reassesses going forward if blood pressure too low on this in context of aortic stenosis.      - Decreased metoprolol to 25 mg from 50 mg starting (1/16). Reassess at follow-up with primary care provider.        Chronic systolic heart failure  Severe Aortic stenosis   Does not look like exacerbation, but last echo from our records in 9/2022 showed LVEF 40-45%, mildly decreased RV function, moderate to severe low flow low gradient aortic stenosis.. care everywhere shows echo from 1/5/23 which showed severe aortic stenosis, LVEF 40-45%, global hypokinesis. He was last seen by cardiology in 2/15/23 at which point they recommended TAVR. He had previously already been approved but then changed his mind.  Decided at 2/15 visit that he wasn't having significant symptoms so no longer wanted to pursue TAVR unless his symptoms worsened.  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    At baseline. Does limit mobility but able to get around with a walker.     Continued home prn tylenol and diclofenac gel.        History of constipation   Continue home prn medications         Observation Goals: -diagnostic tests and consults completed and resulted, -vital signs normal or at patient baseline, -tolerating oral intake to maintain hydration, -dyspnea improved and O2 sats greater than 88% on room air or prior home oxygen levels, -returns to baseline functional status, -safe disposition plan has been identified, Nurse to notify provider when observation goals have been met and patient is ready for discharge.  Diet: Advance Diet as Tolerated: Regular Diet Adult  Diet    DVT Prophylaxis:  DOAC  Felipe Catheter: Not present  Lines: None     Cardiac Monitoring: None  Code Status: Full Code      Clinically Significant Risk Factors Present on Admission               # Drug Induced Coagulation Defect: home medication list includes an anticoagulant medication    # Hypertension: Noted on problem list  # Heart failure, NOS: heart failure noted on the problem list and last echo with EF 40-50%         # Financial/Environmental Concerns: none         Disposition Plan      Expected Discharge Date: 01/16/2024,  9:00 AM  Discharge Delays: *Medically Ready for Discharge  Social/Family Delay  Destination: home with family;home with help/services              Jorge Bhagat DO  Hospitalist Service  [facility]  Securely message with [email or web address] (more info)  Text page via [employer] Paging/Directory   ______________________________________________________________________    Interval History   No acute events overnight. Patient was appropriately conversant with me this morning. Concerns brought up by daughter about being able to appropriately care for patient at home. The patient has been unable to change his briefs here independently and the patient's daughter does not feel comfortable helping him with any brief changes, showering, or other ADL's after discharge.     Physical Exam   Vital Signs: Temp: 98  F (36.7  C) Temp src: Oral BP: (!) 97/39 Pulse: 63   Resp: 18 SpO2: 100 % O2 Device: None (Room air)    Weight: 165 lbs 0 oz    Constitutional: awake, alert, cooperative, no apparent distress, and appears stated age  Respiratory: No increased work of breathing, good air exchange, clear to auscultation bilaterally, no crackles or wheezing  Cardiovascular: Normal apical impulse, regular rate and rhythm, normal S1 and S2, no S3 or S4, and no murmur noted  GI: No scars, normal bowel sounds, soft, non-distended, non-tender, no masses palpated, no hepatosplenomegally  Skin: normal skin color, texture,  turgor  Musculoskeletal: There is no redness, warmth, or swelling of the joints.  Full range of motion noted.  Motor strength is 5 out of 5 all extremities bilaterally.  Tone is normal.    Medical Decision Making       45 MINUTES SPENT BY ME on the date of service doing chart review, history, exam, documentation & further activities per the note.      Data         Imaging results reviewed over the past 24 hrs:   No results found for this or any previous visit (from the past 24 hour(s)).

## 2024-01-16 NOTE — PROGRESS NOTES
Patient is medically ready for discharge per provider.  Patient has been medically ready since 1/15/24.      Background information:     Per daughter Georgina, and [facility] Mariann GALEANA.    Patient was living alone in his home in [place]. His neighbors were his main source of support. Per Georgina, the neighbors set up his medications. Neighbors called Georgina to assist with patient's medications while they were out of town.  Georgina states she has been estranged from patient for 5 years, but agreed to assist. When Georgina arrived, she states the home was a mess. She explains dog feces on the floor, but the dog  2 years ago. She also stated patient's toilet was not working. Therefore, Georgina brought patient to her home in [place]. After a few days of patient being up several times in the night with urinary frequency, Georgina brought patient to the ED because she could not take care of him.    Georgina stated yesterday that she needed to get her home in order, but she was wanting to care for patient in her home with support from [facility] ([phone] Fax: ([phone]). However, when she arrived to the hospital today, she refused to take him home and stated that she can not care for him unless he can be independent with toileting and wear a brief.    LISSETT spoke with Mariann - [facility]  [phone].  Mariann states [facility] has filed an AP report with [facility]. Mariann states the report is based on the condition of patient's home and that his toilet wasn't working when they visited him in the home.  Mariann also expressed concerns regarding patient discharging into Georgina's care. When home care visited patient in his home in [place] and Georgina was there, Mariann explained that Georgina was yelling at patient and making him stay in bed (telling him he couldn't walk), when in fact he can walk.       Current status and recommendations:     Current situation: patient is medically  "ready for discharge. He does not have a safe disposition at this time. Patient's home is concerning for patient to return to, however, patient could choose to discharge back home. Patient does not have a reliable caregiver.      Discussed with patient and Georgina, possible need for LTC. Discussed private cost. Patient states he has private funds for $6000-$8000 down. He also states he has investments that he can \"move around\".    Current disposition goal: LTC    Barriers to discharge:      LTC - beds are limited  Finances - Patient states he has private pay funds that he can access - is forgetful and having a hard time remembering his investment accounts    Mcdonald contacts:     Georgina - daughter - [phone]     [facility]  - [phone]    Upcoming important dates:   N/A    Next steps:      LTC referrals pending       RASHID Fitzpatrick  [facility] [phone]/ [facility] [phone]  Care Management  "

## 2024-01-16 NOTE — UTILIZATION REVIEW
Concurrent stay review; Secondary Review Determination - [facility]        Under the authority of the Utilization Management Committee, the utilization review process indicated a secondary review on the above patient.  The review outcome is based on review of the medical records, discussions with staff, and applying clinical experience noted on the date of the review.        (x) Observation/outpatient Status Appropriate - Concurrent stay review       RATIONALE FOR DETERMINATION:     Patient delayed discharge is related to disposition, there is no medical necessity for inpatient admission at the time of this review. If there is a change in patient status, please resend for review.    The information on this document is developed by the utilization review team in order for the business office to ensure compliance.  This only denotes the appropriateness of proper admission status and does not reflect the quality of care rendered.       The definitions of Inpatient Status and Observation Status used in making the determination above are those provided in the CMS Coverage Manual, Chapter 1 and Chapter 6, section 70.4.       Sincerely,    Luis Christy MD

## 2024-01-16 NOTE — PROGRESS NOTES
Care Management Discharge Note    Discharge Date: 01/16/2024     Discharge Disposition: Home, Home Care    Discharge Services: [facility] ([phone] Fax: [phone])     Discharge DME: None    Discharge Transportation: Georgina    Private pay costs discussed: Not applicable    Does the patient's insurance plan have a 3 day qualifying hospital stay waiver?  No    PAS Confirmation Code:  N/A  Patient/family educated on Medicare website which has current facility and service quality ratings: other (see comments) (N/A)    Education Provided on the Discharge Plan: Yes  Persons Notified of Discharge Plans: Georgina  Patient/Family in Agreement with the Plan: yes    Handoff Referral Completed: Yes    Additional Information:  Patient medically ready for discharge. Plan is for patient to discharge to his daughter, Georgina's home in [place]. He will resume [facility] ([phone] Fax: [phone]) RN and HHA at Georgina's home. Spoke with Bell, [phone], at [facility], and she is aware of patient's discharge plan.    Georgina to transport patient home around 0800 today.    Nikki Rivera [facility] [phone]/ [facility] [phone]  Care Management

## 2024-01-16 NOTE — PROGRESS NOTES
Care Management Follow Up    Length of Stay (days): 0    Expected Discharge Date: 01/16/2024     Concerns to be Addressed: discharge planning       Patient plan of care discussed at interdisciplinary rounds: Yes    Anticipated Discharge Disposition: LTC     Anticipated Discharge Services: None    Anticipated Discharge DME: None    Patient/family educated on Medicare website which has current facility and service quality ratings: other (see comments) (N/A)    Education Provided on the Discharge Plan: Yes    Patient/Family in Agreement with the Plan: yes    Referrals Placed by CM/SW: LTC, Homecare, External Care Coordination    Private pay costs discussed:  private cost for LTC    Additional Information:  Plan from daughter, Georgina, yesterday was that she was getting her house in order to take patient home today. Georgina stated she would pick patient up today and patient would discharge to her home and she would be his caregiver. Patient would resume [facility] ([phone] Fax: ([phone]) at Georgina's home.    However, when Georgina arrived this morning, she stated she could not take patient to her home and repetitively inquired about his incontinence.   -  LISSETT spoke with Mariann - [facility]  [phone].  Mariann states [facility] has filed an AP report with [facility]. Mariann states the report is based on the condition of patient's home and that his toilet wasn't working when they visited him in the home.  Mariann also expressed concerns regarding patient discharging into [facility]'s care. When home care visited patient in his home in [place] and Georgina was there, Mariann explained that Georgina was yelling at patient and making him stay in bed (telling him he couldn't walk), when in fact he can walk.     Current situation: patient is medically ready for discharge. He does not have a safe disposition at this time. Patient's home is concerning for patient to return to, however, patient  "could choose to discharge back home. Patient does not have a reliable caregiver.     Discussed with patient and Georgina, possible need for LTC. Discussed private cost. Patient states he has private funds for $6000-$8000 down. He also states he has investments that he can \"move around\".    When discussing placement options, patient states he would like placement in the [place] area.    Referrals sent to the following facilities:    [facility] ([facility])  Pending - Request Sent N/A [address] [facility] [address] [phone] --   Current Capacity last updated by Luzmaria Salas MA on 1/15/2024  8:21 AM    12/27: Facility is not receiving referrals from us that are sent via Epic, she is working with [place] support to resolve this.            [facility] ([facility])  Pending - Request Sent N/A [address] [facility] [address] [phone] --   Current Capacity last updated by Galdino Ch RN on 8/23/2023  9:14 AM    Admissions @ [phone]            [facility] ([facility])  Pending - Request Sent N/A [address] [facility] [address] [phone] --   [facility] ([facility])  Pending - Request Sent N/A [address] [facility] [address] [phone] --   [facility] ()  Pending - Request Sent N/A [address] [place] [address] [phone] --   Current Capacity last updated by Cherry Lama MA on 1/5/2024  1:09 PM    Admissions [place] [phone]/ No[place]i interim admissions coord. [phone]            [facility] ([facility])  Pending - Request Sent N/A [address] [facility] [address] [phone] --   [facility] ([facility])  Pending - Request Sent N/A [address] [facility] [id number] [phone] --   [facility] ([facility])  Pending - Request Sent N/A [address] [facility] [id number] [phone] --   [facility] ()  Pending - Request Sent N/A [address], [place] [address] [phone] --   [facility] ([facility])  Declined  Payer/insurance denied or not accepted N/A [address] [facility] [id number] [phone] --   Current Capacity last updated by Galdino Ch RN on 1/10/2024 12:49 PM    Admissions [phone]                Patient will likely be able to eventually move to a less restrictive setting such as an DAYA. His inability to get himself to the bathroom independently and do personal cares is the primary concern at this time.    RASHID Fitzpatrick  [facility] [phone]/ [facility] [phone]  Care Management

## 2024-01-16 NOTE — PROGRESS NOTES
Daughter to be here in am,   Patient discharged   Going home with daughter  B/P's soft, BP WITHIN NORMAL LIMITS after patient encouraged to drink fluids  Patient shutting off BA by self  Patient intermittently calls for help or will just get up on his own  Ambulated X3 in hallways  Steady gait with walker, refuses gait belt

## 2024-01-16 NOTE — PROGRESS NOTES
PRIMARY DIAGNOSIS: History of dementia  OUTPATIENT/OBSERVATION GOALS TO BE MET BEFORE DISCHARGE:  ADLs back to baseline:  Patient is ambulating in room and hallways with walker. Doing well.     Activity and level of assistance: Ambulating independently.    Pain status: Patient has chronic knee pain but is declining pain medication at this time.     Return to near baseline physical activity: Yes     Discharge Planner Nurse   Safe discharge environment identified: Yes  Barriers to discharge: No. Patient will be discharging with daughter to her home later today.        Entered by: Philly Armenta RN 01/16/2024 4:17 AM     Blood pressures have been low. Continue to monitor per MD. Patient is asymptomatic.

## 2024-01-16 NOTE — DISCHARGE INSTRUCTIONS
Apixaban (By mouth)  Apixaban (a-PIX-a-ban)  Treats and prevents blood clots. This medicine is a blood thinner.  Brand Name(s):Eliquis,Eliquis 30 Day DVT/PE Starter Pack  There may be other brand names for this medicine.  When This Medicine Should Not Be Used:  This medicine is not right for everyone. Do not use it if you had an allergic reaction to apixaban or you have active bleeding.  How to Use This Medicine:  Tablet  Your doctor will tell you how much medicine to use. Do not use more than directed.   If you are not able to swallow the tablets whole, they may be crushed and mixed in water, 5% dextrose in water (D5W), apple juice, or applesauce. The crushed tablets may be mixed with 60 mL of water or D5W dose and given through a nasogastric tube (NGT).  This medicine should come with a Medication Guide. Ask your pharmacist for a copy if you do not have one.   Missed dose: Take a dose as soon as you remember. If it is almost time for your next dose, wait until then and take a regular dose. Do not take extra medicine to make up for a missed dose.   Store the medicine in a closed container at room temperature, away from heat, moisture, and direct light.   Drugs and Foods to Avoid:  Ask your doctor or pharmacist before using any other medicine, including over-the-counter medicines, vitamins, and herbal products.  Some medicines can affect how apixaban works. Tell your doctor if you are using any of the following:     Carbamazepine, itraconazole, ketoconazole, phenytoin, rifampin, ritonavir, Dennis's wort  Blood thinner (including clopidogrel, heparin, prasugrel, warfarin)  Medicine to treat depression  NSAID pain or arthritis medicine (including aspirin, celecoxib, diclofenac, ibuprofen, naproxen)  Warnings While Using This Medicine:  Tell your doctor if you are pregnant or breastfeeding, or if you have kidney disease, liver disease, bleeding problems, antiphospholipid syndrome, or an artificial heart valve.  Do not  stop using this medicine suddenly without asking your doctor. You might have a higher risk of stroke for a short time after you stop using this medicine.  This medicine increases your risk for bleeding that can become serious if not controlled. You may also bruise easily, and it may take longer than usual for bleeding to stop.  This medicine may increase your risk for a hematoma (blood clot) in your spine or back if you undergo an epidural or spinal puncture. This could lead to paralysis. Tell your doctor if you ever had spine problems or back surgery.  Tell any doctor or dentist who treats you that you are using this medicine. With your doctor's supervision, you may need to stop using this medicine several days before you have surgery or medical tests.  Your doctor will do lab tests at regular visits to check on the effects of this medicine. Keep all appointments.   Keep all medicine out of the reach of children. Never share your medicine with anyone.   Possible Side Effects While Using This Medicine:  Call your doctor right away if you notice any of these side effects:  Allergic reaction: Itching or hives, swelling in your face or hands, swelling or tingling in your mouth or throat, chest tightness, trouble breathing  Change in how much or how often you urinate, red or pink urine  Chest pain, trouble breathing  Coughing up blood, vomiting blood or material that looks like coffee grounds  Numbness, tingling, or muscle weakness in your legs or feet  Red or black, tarry stools  Unusual bleeding, bruising, or weakness  If you notice other side effects that you think are caused by this medicine, tell your doctor.  Call your doctor for medical advice about side effects. You may report side effects to FDA at 7-220-FDA-9079

## 2024-01-17 NOTE — PROGRESS NOTES
PRIMARY DIAGNOSIS: History of dementia  OUTPATIENT/OBSERVATION GOALS TO BE MET BEFORE DISCHARGE:  ADLs back to baseline:  Patient is ambulating in room and hallways with walker. Doing well.     Activity and level of assistance: Ambulating with stand by assist. Mostly transfers from bed to bathroom.     Pain status: Patient has chronic knee pain but is declining pain medication at this time.     Return to near baseline physical activity: Yes     Discharge Planner Nurse   Safe discharge environment identified: No. Patient will require placement.  Barriers to discharge: Yes. Patient needs safe placement.       Entered by: Philly Armenta RN 01/16/2024 11:01 PM

## 2024-01-17 NOTE — PLAN OF CARE
"  Problem: Adult Inpatient Plan of Care  Goal: Plan of Care Review  Description: The Plan of Care Review/Shift note should be completed every shift.  The Outcome Evaluation is a brief statement about your assessment that the patient is improving, declining, or no change.  This information will be displayed automatically on your shift  note.  Outcome: Progressing  Flowsheets (Taken 1/16/2024 1913)  Plan of Care Reviewed With: patient  Overall Patient Progress: no change  Goal: Patient-Specific Goal (Individualized)  Description: You can add care plan individualizations to a care plan. Examples of Individualization might be:  \"Parent requests to be called daily at 9am for status\", \"I have a hard time hearing out of my right ear\", or \"Do not touch me to wake me up as it startles  me\".  Outcome: Progressing  Goal: Absence of Hospital-Acquired Illness or Injury  Outcome: Progressing  Intervention: Identify and Manage Fall Risk  Recent Flowsheet Documentation  Taken 1/16/2024 1913 by Velvet Chaparro RN  Safety Promotion/Fall Prevention: (Pt is using call light appropriately.  Bed alarm in use.) other (see comments)  Taken 1/16/2024 1600 by Velvet Chaparro RN  Safety Promotion/Fall Prevention:   activity supervised   assistive device/personal items within reach   clutter free environment maintained   lighting adjusted   nonskid shoes/slippers when out of bed   room door open  Taken 1/16/2024 0945 by Velvet Chaparro RN  Safety Promotion/Fall Prevention:   activity supervised   assistive device/personal items within reach   clutter free environment maintained   lighting adjusted   nonskid shoes/slippers when out of bed   room door open  Intervention: Prevent Skin Injury  Recent Flowsheet Documentation  Taken 1/16/2024 1658 by Velvet Chaparro RN  Body Position:   position changed independently   left   side-lying  Taken 1/16/2024 1600 by Velvet Chaparro RN  Body Position: position changed " independently  Device Skin Pressure Protection: absorbent pad utilized/changed  Taken 1/16/2024 1341 by Velvet Chaparro RN  Body Position: position changed independently  Taken 1/16/2024 0945 by Velvet Chaparro RN  Body Position: position changed independently  Device Skin Pressure Protection: absorbent pad utilized/changed  Goal: Optimal Comfort and Wellbeing  Outcome: Progressing  Intervention: Provide Person-Centered Care  Recent Flowsheet Documentation  Taken 1/16/2024 1600 by Velvet Chaparro RN  Trust Relationship/Rapport:   care explained   choices provided  Taken 1/16/2024 0945 by Velvet Chaparro RN  Trust Relationship/Rapport:   care explained   choices provided  Goal: Readiness for Transition of Care  Outcome: Progressing     Problem: Urinary Elimination Management  Goal: Effective Urinary Elimination/Continence  Outcome: Progressing  Intervention: Promote Urinary Elimination and Continence  Flowsheets (Taken 1/16/2024 1913)  Assistive Device Use (Bladder Management): (Pt is up with stand by assist and a walker to the bathroom.  Pt is up frequently to void; continent of urine throughout the day.)   diaper/absorbent pad   other (see comments)  Urinary Elimination Techniques: (Pt was started on flomax today.)   fluid intake encouraged   medications used for management   other (see comments)  Strategies/Techniques (Bowel Management): (Pt drank prune juice today.  Has been up trying to have a bowel movement a few times this afternoon.  Pt has had 2 small dark green bowel movements; one time was incontinent in brief.)   fluid intake encouraged   other (see comments)     Problem: Fall Injury Risk  Goal: Absence of Fall and Fall-Related Injury  Outcome: Progressing  Intervention: Identify and Manage Contributors  Recent Flowsheet Documentation  Taken 1/16/2024 1600 by Velvet Chaparro RN  Medication Review/Management:   medications reviewed   high-risk medications identified  Taken  1/16/2024 0945 by Velvet Chaparro, RN  Medication Review/Management:   medications reviewed   high-risk medications identified  Intervention: Promote Injury-Free Environment  Flowsheets  Taken 1/16/2024 1913  Safety Promotion/Fall Prevention: (Pt is using call light appropriately.  Bed alarm in use.) other (see comments)  Taken 1/16/2024 1600  Safety Promotion/Fall Prevention:   activity supervised   assistive device/personal items within reach   clutter free environment maintained   lighting adjusted   nonskid shoes/slippers when out of bed   room door open  Taken 1/16/2024 0945  Safety Promotion/Fall Prevention:   activity supervised   assistive device/personal items within reach   clutter free environment maintained   lighting adjusted   nonskid shoes/slippers when out of bed   room door open   Goal Outcome Evaluation:      Plan of Care Reviewed With: patient    Overall Patient Progress: no changeOverall Patient Progress: no change

## 2024-01-17 NOTE — PROGRESS NOTES
[facility]    Medicine Progress Note - Hospitalist Service    Date of Admission:  1/14/2024    Assessment & Plan   Liz Shankar is a 89 year old male admitted on 1/14/2024. He brought in by daughter due to weakness, frequent urination, and concerns that she cannot manage him at home.      Failure to thrive  Unsafe living conditions  Daughter brought in because she felt like she needed a break and was worried about being able to care for him.  Patient did well with physical therapy, is independent with walker and did not have TCU needs. Patient ok for discharge per physical therapy/occupational therapy.  Does have mild dementia as below, but appears to have good insight at present into his condition and plan.    Patient agreeable to going home with dauther. Per daughter and per home care there are significant concerns about the condition of his home if he decides to return to his own home without his daughter.    - Patient's daughter refusing to take patient home and concerned about level of care. Patient's current home sounds to be in unlivable conditions due to sanitation concerns. Unable to discharge home given new circumstances and unable to take care of himself alone.   - LTC referrals placed, awaiting placement     Frequent urination  Known history of BPH  No dysuria and UA was negative.  Post void residual 100. UA negative.    - Adult depends in place  - No medications prior, may benefit from Flomax given nocturia and frequency of report urinations  - Continue Flomax 0.4 mg daily     Dementia  On problem list but not clear how advanced.  No report of any changes in mental status.  Patient pretty clear and good here.  No confusion.  SLUMS 24, consistent with mild dementia.      Chronic atrial fibrillation on anticoagulation   On apixaban and metoprolol. Good rate control here. Continued   - patient says not falling more than once per month, at present benefit probably outweighs  risk but will need to continue to monitor risk/benefit of this and reassess with primary care provider and cardiology at follow-up.       Essential hypertension   Blood pressure low-normal, continue metoprolol for now, but may need to reassesses going forward if blood pressure too low on this in context of aortic stenosis.      - Decreased metoprolol to 25 mg from 50 mg starting (1/16). Reassess at follow-up with primary care provider.        Chronic systolic heart failure  Severe Aortic stenosis   Does not look like exacerbation, but last echo from our records in 9/2022 showed LVEF 40-45%, mildly decreased RV function, moderate to severe low flow low gradient aortic stenosis.. care everywhere shows echo from 1/5/23 which showed severe aortic stenosis, LVEF 40-45%, global hypokinesis. He was last seen by cardiology in 2/15/23 at which point they recommended TAVR. He had previously already been approved but then changed his mind.  Decided at 2/15 visit that he wasn't having significant symptoms so no longer wanted to pursue TAVR unless his symptoms worsened.  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    At baseline. Does limit mobility but able to get around with a walker.     Continued home prn tylenol and diclofenac gel.        History of constipation   Continue home prn medications         Observation Goals: -diagnostic tests and consults completed and resulted, -vital signs normal or at patient baseline, -tolerating oral intake to maintain hydration, -dyspnea improved and O2 sats greater than 88% on room air or prior home oxygen levels, -returns to baseline functional status, -safe disposition plan has been identified, Nurse to notify provider when observation goals have been met and patient is ready for discharge.  Diet: Advance Diet as Tolerated: Regular Diet Adult  Diet     DVT Prophylaxis: DOAC  Felipe Catheter: Not present  Lines: None     Cardiac Monitoring: None  Code Status: Full Code      Clinically Significant Risk Factors Present on Admission               # Drug Induced Coagulation Defect: home medication list includes an anticoagulant medication    # Hypertension: Noted on problem list  # Heart failure, NOS: heart failure noted on the problem list and last echo with EF 40-50%         # Financial/Environmental Concerns: none         Disposition Plan      Expected Discharge Date: 01/18/2024    Discharge Delays: *Medically Ready for Discharge  Social/Family Delay  Placement - LTC  Destination: home with family;home with help/services              Jorge Bhagat DO  Hospitalist Service  [facility]  Securely message with [employer] (more info)  Text page via [employer] Paging/Directory   ______________________________________________________________________    Interval History   No acute events overnight. Patient has no acute concerns or complaints.     Physical Exam   Vital Signs: Temp: 98.4  F (36.9  C) Temp src: Oral BP: 103/60 Pulse: 95   Resp: 18 SpO2: 95 % O2 Device: None (Room air)    Weight: 165 lbs 0 oz    Constitutional: awake, alert, cooperative, no apparent distress, and appears stated age  Respiratory: No increased work of breathing, good air exchange, clear to auscultation bilaterally, no crackles or wheezing  Cardiovascular: Normal apical impulse, regular rate and rhythm, normal S1 and S2, no S3 or S4, and no murmur noted  GI: No scars, normal bowel sounds, soft, non-distended, non-tender, no masses palpated, no hepatosplenomegally  Skin: normal skin color, texture, turgor  Musculoskeletal: There is no redness, warmth, or swelling of the joints.  Full range of motion noted.  Motor strength is 5 out of 5 all extremities bilaterally.  Tone is normal.    Medical Decision Making       35 MINUTES SPENT BY ME on the date of service doing chart review,  history, exam, documentation & further activities per the note.      Data         Imaging results reviewed over the past 24 hrs:   No results found for this or any previous visit (from the past 24 hour(s)).

## 2024-01-17 NOTE — PROGRESS NOTES
Care Management Follow Up    Length of Stay (days): 0    Expected Discharge Date: 2024     Concerns to be Addressed: discharge planning     Patient plan of care discussed at interdisciplinary rounds: Yes    Anticipated Discharge Disposition: Home, Home Care     Anticipated Discharge Services: None  Anticipated Discharge DME: None    Patient/family educated on Medicare website which has current facility and service quality ratings: other (see comments) (N/A)  Education Provided on the Discharge Plan: Yes  Patient/Family in Agreement with the Plan: yes    Referrals Placed by CM/SW: Homecare, External Care Coordination  Private pay costs discussed: private room/amenity fees    Additional Information:    Patient is medically ready for discharge per provider.  Patient has been medically ready since 1/15/24.       Background information:      Per daughter Georgina, and [facility]n GALEANA.     Patient was living alone in his home in [place]. His neighbors were his main source of support. Per Georgina, the neighbors set up his medications. Neighbors called Georgina to assist with patient's medications while they were out of town.  Georgina states she has been estranged from patient for 5 years, but agreed to assist. When Georgina arrived, she states the home was a mess. She explains dog feces on the floor, but the dog  2 years ago. She also stated patient's toilet was not working. Therefore, Georgina brought patient to her home in [place]. After a few days of patient being up several times in the night with urinary frequency, Georgina brought patient to the ED because she could not take care of him.     Georgina stated yesterday that she needed to get her home in order, but she was wanting to care for patient in her home with support from [facility] ([phone] Fax: [phone]). However, when she arrived to the hospital today, she refused to take him home and stated that she can not care for him unless he can be  "independent with toileting and wear a brief.     CM spoke with Mariann - [facility]  [phone].  [facility]. Mariann states the report is based on the condition of patient's home and that his toilet wasn't working when they visited him in the home.  Mariann also expressed concerns regarding patient discharging into Georgina's care. When home care visited patient in his home in [place] and Georgina was there, Mariann explained that Georgina was yelling at patient and making him stay in bed (telling him he couldn't walk), when in fact he can walk.      Current status and recommendations:      Current situation: patient is medically ready for discharge. He does not have a safe disposition at this time. Patient's home is concerning for patient to return to, however, patient could choose to discharge back home. Patient does not have a reliable caregiver.      Discussed with patient and Georgina, possible need for LTC. Discussed private cost. Patient states he has private funds for $6000-$8000 down. He also states he has investments that he can \"move around\".     Current disposition goal: LTC- has been clinically accepted at [facility]     Barriers to discharge:     LTC - beds are limited    Finances - Patient states he has private pay funds that he can access - doesn't have the amount of private pay funds that are \"liquid\" to pay for down payment. Patient will need to move funds from assets into checking account to be able afford down payment for LTC.     Key contacts:    Georgina - daughter - [phone]      Mariann - [facility]  - [phone]     Upcoming important dates:   N/A     Next steps:    - Georgina is trying to assist patient with moving money from assets to checking account to be able to pay for LTC services. Georgina does not have POA or any legal ability to do this on her own so she must assist/push " patient to do it on his own.   - Patient is accepted at [facility] (Admissions Phone: [phone] Main Phone: [phone] Fax: [phone]) but need to confirm details of move in when funds are available.     Mirella Mayes, \[facility] [place] [facility]\""  Inpatient Care Coordinator   [facility] [phone]  [facility] [phone]

## 2024-01-17 NOTE — PLAN OF CARE
"  Problem: Adult Inpatient Plan of Care  Goal: Plan of Care Review  Description: The Plan of Care Review/Shift note should be completed every shift.  The Outcome Evaluation is a brief statement about your assessment that the patient is improving, declining, or no change.  This information will be displayed automatically on your shift  note.  Outcome: Progressing  Flowsheets (Taken 1/17/2024 1414)  Outcome Evaluation: Patient is awaiting discharge to LTC bed.  Plan of Care Reviewed With: patient  Goal: Patient-Specific Goal (Individualized)  Description: You can add care plan individualizations to a care plan. Examples of Individualization might be:  \"Parent requests to be called daily at 9am for status\", \"I have a hard time hearing out of my right ear\", or \"Do not touch me to wake me up as it startles  me\".  Outcome: Progressing  Goal: Absence of Hospital-Acquired Illness or Injury  Outcome: Progressing  Intervention: Identify and Manage Fall Risk  Recent Flowsheet Documentation  Taken 1/17/2024 1417 by Velvet Chaparro RN  Safety Promotion/Fall Prevention: (Bed and chair alarms in use for safety.  Patient does occasionally turn his bed alarm off and also turned off his call light this morning.  Discussed with patient importance of him calling and waiting for assistance for his safety.)   activity supervised   assistive device/personal items within reach   lighting adjusted   increase visualization of patient   clutter free environment maintained   nonskid shoes/slippers when out of bed   patient and family education   room door open   other (see comments)  Taken 1/17/2024 0900 by Velvet Chaparro RN  Safety Promotion/Fall Prevention:   activity supervised   assistive device/personal items within reach   clutter free environment maintained   lighting adjusted   nonskid shoes/slippers when out of bed  Intervention: Prevent Skin Injury  Recent Flowsheet Documentation  Taken 1/17/2024 0900 by Shankar" Velvet RN  Body Position: position changed independently  Device Skin Pressure Protection: absorbent pad utilized/changed  Goal: Optimal Comfort and Wellbeing  Outcome: Progressing  Intervention: Monitor Pain and Promote Comfort  Flowsheets (Taken 1/17/2024 1416)  Pain Management Interventions: (Patient denies pain.) other (see comments)  Intervention: Provide Person-Centered Care  Recent Flowsheet Documentation  Taken 1/17/2024 0900 by Velvet Chaparro RN  Trust Relationship/Rapport:   care explained   choices provided  Goal: Readiness for Transition of Care  Outcome: Progressing     Problem: Urinary Elimination Management  Goal: Effective Urinary Elimination/Continence  Outcome: Progressing  Intervention: Promote Urinary Elimination and Continence  Flowsheets (Taken 1/17/2024 1416)  Urinary Elimination Techniques: (Patient voids frequently.  Medicated with scheduled flomax.)   fluid intake encouraged   medications used for management   perineal skin care techniques   other (see comments)  Strategies/Techniques (Bowel Management): (Patient had 2 small bowel movements on 1/16/24.)   fluid intake encouraged   other (see comments)     Problem: Fall Injury Risk  Goal: Absence of Fall and Fall-Related Injury  Outcome: Progressing  Intervention: Identify and Manage Contributors  Flowsheets  Taken 1/17/2024 1417  Self-Care Promotion: independence encouraged  Medication Review/Management: (Patient is on metoprolol.  Asked provider for parameters to administer metoprolol due to soft BP's; order placed to HOLD metoprolol for SBP <90.  Todays BP's 90/42, 90/35 and then 103/60.  Patient denies dizziness.  Up to shower with NST assist today.)   medications reviewed   high-risk medications identified   other (see comments)  Taken 1/17/2024 0900  Medication Review/Management:   medications reviewed   high-risk medications identified  Intervention: Promote Injury-Free Environment  Flowsheets  Taken 1/17/2024 1417  Safety  Promotion/Fall Prevention: (Bed and chair alarms in use for safety.  Patient does occasionally turn his bed alarm off and also turned off his call light this morning.  Discussed with patient importance of him calling and waiting for assistance for his safety.)   activity supervised   assistive device/personal items within reach   lighting adjusted   increase visualization of patient   clutter free environment maintained   nonskid shoes/slippers when out of bed   patient and family education   room door open   other (see comments)  Taken 1/17/2024 0900  Safety Promotion/Fall Prevention:   activity supervised   assistive device/personal items within reach   clutter free environment maintained   lighting adjusted   nonskid shoes/slippers when out of bed     Goal Outcome Evaluation:  Patient encouraged to reposition frequently; as he has some red blanchable areas on hip/buttock areas due to laying on left side.  Red blanchable sacrum/coccyx; barrier cream applied.  Patient ambulated in hallway x 1 with NST.  Patient up in chair with chair cushion in place.      Plan of Care Reviewed With: patient    Overall Patient Progress: no changeOverall Patient Progress: no change    Outcome Evaluation: Patient is awaiting discharge to LTC bed.

## 2024-01-17 NOTE — PROGRESS NOTES
PRIMARY DIAGNOSIS: History of dementia  OUTPATIENT/OBSERVATION GOALS TO BE MET BEFORE DISCHARGE:  ADLs back to baseline:  Patient is ambulating in room and hallways with walker. Doing well.     Activity and level of assistance: Ambulating with stand by assist. Mostly transfers from bed to bathroom.     Pain status: Patient has chronic knee pain but is declining pain medication at this time.     Return to near baseline physical activity: Yes     Discharge Planner Nurse   Safe discharge environment identified: No. Patient will require placement.  Barriers to discharge: Yes. Patient needs safe placement.       Entered by: Philly Armenta RN 01/17/2024 2:09 AM

## 2024-01-18 NOTE — PLAN OF CARE
Goal Outcome Evaluation:      Plan of Care Reviewed With: patient, caregiver, child    Overall Patient Progress: improvingOverall Patient Progress: improving    Outcome Evaluation: Pt's daughter Georgina here at 1200. Would like to speak with YVONNE MEREDITH for social work that she is here to discuss dispo.    Pt is alert and oriented but struggles to remember specifics during orientation questions (couldn't remember the name of the hospital or city he's in; couldn't remember Charles's name for President). Up to BR at lunch and had small soft red like BM. Eating 100% of all meals. Reported mild back pain this morning, which resolved with PRN MPAP. BP's remain soft at baseline, but Pt did have a BP of 73/32 this morning. Metoprolol held. Dr. Restrepo updated - Pt is asymptomatic, and does not have PIV access. Pitcher of ice water brought into room, and Pt encouraged to drink frequently. Plan to move slow during position changes and while OOB. BP increased to 95/28 by the afternoon.    Anticipate discharge to [facility] once private pay has been established.     Randy Hardwick RN on 1/18/2024 at 1:24 PM

## 2024-01-18 NOTE — PROGRESS NOTES
[facility]    Medicine Progress Note - Hospitalist Service    Date of Admission:  1/14/2024    Assessment & Plan   Liz Shanakr is a 89 year old male admitted on 1/14/2024. He brought in by daughter due to weakness, frequent urination, and concerns that she cannot manage him at home.      Failure to thrive  Unsafe living conditions  Daughter brought in because she felt like she needed a break and was worried about being able to care for him.  Patient did well with physical therapy, is independent with walker and did not have TCU needs. Patient ok for discharge per physical therapy/occupational therapy.  Does have mild dementia as below, but appears to have good insight at present into his condition and plan.    Patient agreeable to going home with dauther. Per daughter and per home care there are significant concerns about the condition of his home if he decides to return to his own home without his daughter.    - Patient's daughter refusing to take patient home and concerned about level of care. Patient's current home sounds to be in unlivable conditions due to sanitation concerns. Unable to discharge home given new circumstances and unable to take care of himself alone.   - LTC referrals placed, awaiting placement once finances are secured.     Hypotension  Patient with persistently low BP while inpatient with readings as low as 60/20's (right arm) while remaining asymptomatic. Likely right arm with falsely low BP reading given patient's lack of symptoms. Patient's recheck consistently 90's/40-50s while remaining asymptomatic of dizziness or orthostasis with ambulation. Possible interaction with Flomax for hypotension, however patient's BP low prior to initiating this medication 01/16.   - Holding metoprolol   - Encourage PO intake  - Please use left arm for BP readings     Frequent urination  Known history of BPH  No dysuria and UA was negative.  Post void residual 100. UA negative.     - Adult depends in place  - No medications prior, may benefit from Flomax given nocturia and frequency of report urinations  - Flomax 0.4 mg daily     Dementia  On problem list but not clear how advanced.  No report of any changes in mental status.  Patient pretty clear and good here.  No confusion.  SLUMS 24, consistent with mild dementia.      Chronic atrial fibrillation on anticoagulation   On apixaban and metoprolol. Good rate control here. Continued   - patient says not falling more than once per month, at present benefit probably outweighs risk but will need to continue to monitor risk/benefit of this and reassess with primary care provider and cardiology at follow-up.       Essential hypertension   Blood pressure low-normal, continue metoprolol for now, but may need to reassesses going forward if blood pressure too low on this in context of aortic stenosis.      - Holding metoprolol given persistent hypotension      Chronic systolic heart failure  Severe Aortic stenosis   Does not look like exacerbation, but last echo from our records in 9/2022 showed LVEF 40-45%, mildly decreased RV function, moderate to severe low flow low gradient aortic stenosis.. care everywhere shows echo from 1/5/23 which showed severe aortic stenosis, LVEF 40-45%, global hypokinesis. He was last seen by cardiology in 2/15/23 at which point they recommended TAVR. He had previously already been approved but then changed his mind.  Decided at 2/15 visit that he wasn't having significant symptoms so no longer wanted to pursue TAVR unless his symptoms worsened.  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    At baseline. Does limit mobility but able to get around with a walker.     Continued home prn tylenol and diclofenac gel.        History of constipation   Continue home prn medications          Observation Goals: -diagnostic tests and consults completed and resulted, -vital signs normal or at patient baseline, -tolerating oral intake to maintain hydration, -dyspnea improved and O2 sats greater than 88% on room air or prior home oxygen levels, -returns to baseline functional status, -safe disposition plan has been identified, Nurse to notify provider when observation goals have been met and patient is ready for discharge.  Diet: Advance Diet as Tolerated: Regular Diet Adult  Diet    DVT Prophylaxis: DOAC  Felipe Catheter: Not present  Lines: None     Cardiac Monitoring: None  Code Status: Full Code      Clinically Significant Risk Factors Present on Admission               # Drug Induced Coagulation Defect: home medication list includes an anticoagulant medication    # Hypertension: Noted on problem list  # Heart failure, NOS: heart failure noted on the problem list and last echo with EF 40-50%         # Financial/Environmental Concerns: none         Disposition Plan      Expected Discharge Date: 01/18/2024    Discharge Delays: *Medically Ready for Discharge  Social/Family Delay  Placement - LTC  Destination: home with family;home with help/services              Jorge Bhagat DO  Hospitalist Service  [facility]  Securely message with [employer] (more info)  Text page via [employer] Paging/Directory   ______________________________________________________________________    Interval History   No acute events overnight. Patient making needs known to staff and communicating appropriately. No acute concerns or complaints since yesterday.     Physical Exam   Vital Signs: Temp: 97.9  F (36.6  C) Temp src: Oral BP: (!) 95/38 Pulse: 57   Resp: 17 SpO2: 99 % O2 Device: None (Room air)    Weight: 165 lbs 0 oz    Constitutional: awake, alert, cooperative, no apparent distress, and appears stated age  Respiratory: No increased work of breathing, good air exchange, clear to auscultation bilaterally,  no crackles or wheezing  Cardiovascular: Normal apical impulse, regular rate and rhythm, normal S1 and S2, no S3 or S4, and no murmur noted  GI: No scars, normal bowel sounds, soft, non-distended, non-tender, no masses palpated, no hepatosplenomegally  Skin: normal skin color, texture, turgor  Musculoskeletal: There is no redness, warmth, or swelling of the joints.  Full range of motion noted.  Motor strength is 5 out of 5 all extremities bilaterally.  Tone is normal.    Medical Decision Making       35 MINUTES SPENT BY ME on the date of service doing chart review, history, exam, documentation & further activities per the note.      Data         Imaging results reviewed over the past 24 hrs:   No results found for this or any previous visit (from the past 24 hour(s)).

## 2024-01-18 NOTE — PLAN OF CARE
Problem: Adult Inpatient Plan of Care  Goal: Absence of Hospital-Acquired Illness or Injury  Outcome: Progressing  Intervention: Identify and Manage Fall Risk  Recent Flowsheet Documentation  Taken 1/18/2024 0000 by Ria Reeves RN  Safety Promotion/Fall Prevention: activity supervised  Taken 1/17/2024 2200 by Ria Reeves RN  Safety Promotion/Fall Prevention: activity supervised  Intervention: Prevent Skin Injury  Recent Flowsheet Documentation  Taken 1/18/2024 0000 by Ria Reeves, RN  Body Position: position changed independently  Device Skin Pressure Protection: absorbent pad utilized/changed  Taken 1/17/2024 2200 by Ria Reeves RN  Device Skin Pressure Protection: absorbent pad utilized/changed  Goal: Optimal Comfort and Wellbeing  Outcome: Progressing     Problem: Urinary Elimination Management  Goal: Effective Urinary Elimination/Continence  Outcome: Progressing     Problem: Fall Injury Risk  Goal: Absence of Fall and Fall-Related Injury  Outcome: Progressing  Intervention: Identify and Manage Contributors  Recent Flowsheet Documentation  Taken 1/18/2024 0000 by Ria Reeves RN  Medication Review/Management: medications reviewed  Taken 1/17/2024 2200 by Ria Reeves RN  Medication Review/Management: medications reviewed  Intervention: Promote Injury-Free Environment  Recent Flowsheet Documentation  Taken 1/18/2024 0000 by Ria Reeves RN  Safety Promotion/Fall Prevention: activity supervised  Taken 1/17/2024 2200 by Ria Reeves RN  Safety Promotion/Fall Prevention: activity supervised   Goal Outcome Evaluation:    Pt. Had no complaints of pain this shift. Used call light appropriately to use the bathroom. Used the bathroom frequently throughout  the night. Ambulated in room SBA with a walker.

## 2024-01-18 NOTE — PROGRESS NOTES
Care Management Follow Up    Length of Stay (days): 0    Expected Discharge Date: 2024     Concerns to be Addressed: discharge planning     Patient plan of care discussed at interdisciplinary rounds: Yes    Anticipated Discharge Disposition: Home, Home Care     Anticipated Discharge Services: None  Anticipated Discharge DME: None    Patient/family educated on Medicare website which has current facility and service quality ratings: other (see comments) (N/A)  Education Provided on the Discharge Plan: Yes  Patient/Family in Agreement with the Plan: yes    Referrals Placed by CM/SW: Homecare, External Care Coordination  Private pay costs discussed: private room/amenity fees and transportation costs    Additional Information:  Patient is medically ready for discharge per provider.  Patient has been medically ready since 1/15/24.       Background information:      Per daughter Georgina, [facility].     Patient was living alone in his home in [place]. His neighbors were his main source of support. Per Georgina, the neighbors set up his medications. Neighbors called Georgina to assist with patient's medications while they were out of town.  Georgina states she has been estranged from patient for 5 years, but agreed to assist. When Georgina arrived, she states the home was a mess. She explains dog feces on the floor, but the dog  2 years ago. She also stated patient's toilet was not working. Therefore, Georgina brought patient to her home in [place]. After a few days of patient being up several times in the night with urinary frequency, Georgina brought patient to the ED because she could not take care of him.     Georgina stated yesterday that she needed to get her home in order, but she was wanting to care for patient in her home with support from [facility] ([phone] Fax: ([phone]). However, when she arrived to the hospital today, she refused to take him home and stated that she can not care  "for him unless he can be independent with toileting and wear a brief.     CM spoke with Mariann Valadez [facility]  [phone].  [facility]. Mariann states the report is based on the condition of patient's home and that his toilet wasn't working when they visited him in the home.  Mariann also expressed concerns regarding patient discharging into Georgina's care. When home care visited patient in his home in [place] and Georgina was there, Mariann explained that Georgina was yelling at patient and making him stay in bed (telling him he couldn't walk), when in fact he can walk.      Current status and recommendations:   Current situation: patient is medically ready for discharge. He does not have a safe disposition at this time. Patient's home is concerning for patient to return to, however, patient could choose to discharge back home. Patient does not have a reliable caregiver.      Discussed with patient and Georgina, possible need for LTC. Discussed private cost. Patient states he has private funds for $6000-$8000 down. He also states he has investments that he can \"move around\".     Current disposition goal: LTC- has been clinically accepted at [facility]     Barriers to discharge:     LTC - beds are limited     Finances - Patient states he has private pay funds that he can access - doesn't have the amount of private pay funds that are \"liquid\" to pay for down payment. Patient will need to move funds from assets into checking account to be able afford down payment for LTC.    -POA and HCD for dementia patient in LTC     Mcdonald contacts:    Georgina - daughter - [phone]      Mariann - Karen [facility]  - [phone]      Next steps:    - Georgina is trying to assist patient with moving money from assets to checking account to be able to pay for LTC services. Georgina does not have POA or any legal ability to do this on " her own so she must assist/push patient to do it on his own.     CM spoke with Georgina on 1/18, Georgina was able to get pts check book from pts home but is unsure of the amount pt has in his account. Per Georgina she tried to connect with [employer] and they were not able to give her any information. [place] bank was unable to give any information to pt as he was unable to answer their questions. Georgina stated her uncle Bairon may have access per pt.    CM placed call to pts brother Bairon at [phone] to see if he has POA or access to pts funds. Bairon stated he was told by the pt that he is POA and has access to his [place] bank account. Bairon attempted to contact [employer] and was unable to get information. Bairon stated pt has a cabin as well up [place] that he has been telling the pt to sell.      Patient is accepted at [facility] (Admissions Phone: [phone] Main Phone: [phone] Fax: [phone]) [place]T spoke with Amy in admissions who stated they require 7k down deposit and bank statements showing 3 months of funds. Per Amy facility costs around 12k a month. Amy stated that if pt has a diagnosis of dementia he will need a POA and HCD to be admitted to their long term care.     Patient will need a POA and HCD    CM to follow up with daughter Georgina tmr to discuss need for POA an HCD.    Brenda Diaz, SHRUTHI  Care Transitions Registered Nurse  Tele: [phone]

## 2024-01-19 NOTE — PROVIDER NOTIFICATION
Notified: LILLIE Restrepo    Text: Soft BPs persist: 78/34 RUE 92/41 LUE Pt denies dyspnea HR 74-94 bpm O2 98% on RA    Response:  Use left arm for BP

## 2024-01-19 NOTE — PLAN OF CARE
Problem: Adult Inpatient Plan of Care  Goal: Plan of Care Review  Description: The Plan of Care Review/Shift note should be completed every shift.  The Outcome Evaluation is a brief statement about your assessment that the patient is improving, declining, or no change.  This information will be displayed automatically on your shift  note.  Outcome: Not Progressing  Flowsheets (Taken 1/19/2024 1141)  Plan of Care Reviewed With: patient  Overall Patient Progress: no change   Goal Outcome Evaluation:      Plan of Care Reviewed With: patient    Overall Patient Progress: no changeOverall Patient Progress: no change    Pt A&Ox4. Able to make needs known. Sitting on edge of bed with chair alarm for meals per patient preference.  C/O 2-3/10 knee pain treated with PRN med.  Relief stated.

## 2024-01-19 NOTE — PROGRESS NOTES
Care Management Follow Up    Length of Stay (days): OBS    Expected Discharge Date: 01/22/2024     Concerns to be Addressed: discharge planning     Patient plan of care discussed at interdisciplinary rounds: Yes    Anticipated Discharge Disposition: LTC     Anticipated Discharge Services: None  Anticipated Discharge DME: None    Patient/family educated on Medicare website which has current facility and service quality ratings: other (see comments) (N/A)  Education Provided on the Discharge Plan: Yes  Patient/Family in Agreement with the Plan: yes    Referrals Placed by CM/SW:  External Care Coordination  Private pay costs discussed:  [facility] private pay    Additional Information:  Brother Bairon called and left a message to return his call.  Bairon was calling just to get an up date and to see how much it would cost to at least get him to the facility as he may be able to provide the down payment.  Discussed it would be approx 8 thousand down and 12 a month, however, they would need a bank statement with 3 months worth of available funds.  Bairon states he is not able to accomodate that.  He is going to go and see patient on Monday and see if he can call the bank with patient to gain access.     LEXY Casper, RN, PHN, [facility]  Care Coordinator-Long Term Care  [address] [address]  [email or web address]   [email or web address]     Office: [phone]   Fax: [phone]

## 2024-01-19 NOTE — PROGRESS NOTES
[facility]    Medicine Progress Note - Hospitalist Service    Date of Admission:  1/14/2024    Assessment & Plan   Liz Shankar is a 89 year old male admitted on 1/14/2024. He brought in by daughter due to weakness, frequent urination, and concerns that she cannot manage him at home. Continues to be a placement issue with concern for finances. Patient working with  to secure funding for LTC.      Failure to thrive  Unsafe living conditions  Daughter brought in because she felt like she needed a break and was worried about being able to care for him.  Patient did well with physical therapy, is independent with walker and did not have TCU needs. Patient ok for discharge per physical therapy/occupational therapy.  Does have mild dementia as below, but appears to have good insight at present into his condition and plan.    Patient agreeable to going home with cindyuther. Per daughter and per home care there are significant concerns about the condition of his home if he decides to return to his own home without his daughter.    - Patient's daughter refusing to take patient home and concerned about level of care. Patient's current home sounds to be in unlivable conditions due to sanitation concerns. Unable to discharge home given new circumstances and unable to take care of himself alone with daughter refusing to take him into her home.   - LTC referrals placed, awaiting placement once finances are secured.     Hypotension  Patient with persistently low BP while inpatient with readings as low as 60/20's (right arm) while remaining asymptomatic. Likely right arm with falsely low BP reading given patient's lack of symptoms. Patient's recheck consistently 90's/40-50s while remaining asymptomatic of dizziness or orthostasis with ambulation. Possible interaction with Flomax for hypotension, however patient's BP low prior to initiating this medication 01/16. Improved while off Metoprolol. Patient  continues to be asymptomatic.   - Holding metoprolol   - Encourage PO intake  - Please use left arm for BP readings     Frequent urination  Known history of BPH  No dysuria and UA was negative.  Post void residual 100. UA negative.    - Adult depends in place  - No medications prior, may benefit from Flomax given nocturia and frequency of report urinations  - Continue Flomax 0.4 mg daily (new this admission)    Dementia  On problem list but not clear how advanced.  No report of any changes in mental status.  Patient pretty clear and good here.  No confusion.  SLUMS 24, consistent with mild dementia.      Chronic atrial fibrillation on anticoagulation   On apixaban and metoprolol. Good rate control here. Continued   - patient says not falling more than once per month, at present benefit probably outweighs risk but will need to continue to monitor risk/benefit of this and reassess with primary care provider and cardiology at follow-up.    - Holding metoprolol currently, remains with HR <100 BPM     Essential hypertension   Blood pressure low-normal, continue metoprolol for now, but may need to reassesses going forward if blood pressure too low on this in context of aortic stenosis.      - Holding metoprolol given persistent hypotension      Chronic systolic heart failure  Severe Aortic stenosis   Does not look like exacerbation, but last echo from our records in 9/2022 showed LVEF 40-45%, mildly decreased RV function, moderate to severe low flow low gradient aortic stenosis.. care everywhere shows echo from 1/5/23 which showed severe aortic stenosis, LVEF 40-45%, global hypokinesis. He was last seen by cardiology in 2/15/23 at which point they recommended TAVR. He had previously already been approved but then changed his mind.  Decided at 2/15 visit that he wasn't having significant symptoms so no longer wanted to pursue TAVR unless his symptoms worsened.  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    At baseline. Does limit mobility but able to get around with a walker.     Continued home prn tylenol and diclofenac gel.        History of constipation   Continue home prn medications         Observation Goals: -diagnostic tests and consults completed and resulted, -vital signs normal or at patient baseline, -tolerating oral intake to maintain hydration, -dyspnea improved and O2 sats greater than 88% on room air or prior home oxygen levels, -returns to baseline functional status, -safe disposition plan has been identified, Nurse to notify provider when observation goals have been met and patient is ready for discharge.  Diet: Advance Diet as Tolerated: Regular Diet Adult  Diet    DVT Prophylaxis: DOAC  Felipe Catheter: Not present  Lines: None     Cardiac Monitoring: None  Code Status: Full Code      Clinically Significant Risk Factors Present on Admission               # Drug Induced Coagulation Defect: home medication list includes an anticoagulant medication    # Hypertension: Noted on problem list  # Heart failure, NOS: heart failure noted on the problem list and last echo with EF 40-50%         # Financial/Environmental Concerns: none         Disposition Plan      Expected Discharge Date: 01/22/2024    Discharge Delays: *Medically Ready for Discharge  Social/Family Delay  Placement - LTC  Destination: home with family;home with help/services              Jorge Bhagat DO  Hospitalist Service  [facility]  Securely message with [employer] (more info)  Text page via [employer] Paging/Directory   ______________________________________________________________________    Interval History   No acute events overnight. Patient was resting comfortably in bed following breakfast. Patient had no complaints other than some bilateral knee pain, which has been chronic. No other acute  concerns or complaints.     Physical Exam   Vital Signs: Temp: 97.9  F (36.6  C) Temp src: Oral BP: 116/67 Pulse: 85   Resp: 16 SpO2: 97 % O2 Device: None (Room air)    Weight: 165 lbs 0 oz    Constitutional: awake, alert, cooperative, no apparent distress, and appears stated age  Respiratory: No increased work of breathing, good air exchange, clear to auscultation bilaterally, no crackles or wheezing  Cardiovascular: Normal apical impulse, regular rate and rhythm, normal S1 and S2, no S3 or S4, and no murmur noted  GI: No scars, normal bowel sounds, soft, non-distended, non-tender, no masses palpated, no hepatosplenomegally      Medical Decision Making       35 MINUTES SPENT BY ME on the date of service doing chart review, history, exam, documentation & further activities per the note.      Data         Imaging results reviewed over the past 24 hrs:   No results found for this or any previous visit (from the past 24 hour(s)).

## 2024-01-19 NOTE — PLAN OF CARE
Problem: Adult Inpatient Plan of Care  Goal: Absence of Hospital-Acquired Illness or Injury  Outcome: Progressing  Intervention: Identify and Manage Fall Risk  Recent Flowsheet Documentation  Taken 1/19/2024 0000 by Ria Reeves RN  Safety Promotion/Fall Prevention: activity supervised  Intervention: Prevent Skin Injury  Recent Flowsheet Documentation  Taken 1/19/2024 0000 by Ria Reeves, RN  Body Position: position changed independently  Device Skin Pressure Protection: absorbent pad utilized/changed  Goal: Optimal Comfort and Wellbeing  Outcome: Progressing  Goal: Readiness for Transition of Care  Outcome: Progressing     Problem: Urinary Elimination Management  Goal: Effective Urinary Elimination/Continence  Outcome: Progressing     Problem: Fall Injury Risk  Goal: Absence of Fall and Fall-Related Injury  Outcome: Progressing  Intervention: Promote Injury-Free Environment  Recent Flowsheet Documentation  Taken 1/19/2024 0000 by Ria Reeves RN  Safety Promotion/Fall Prevention: activity supervised   Goal Outcome Evaluation:       Pt. Rested most of shift complaints of pain in the knees was given tylenol per MAR. This seemed to help. Was up multiple times throughout the night to urinate setting off the bed alarm. No falls.

## 2024-01-20 NOTE — PROGRESS NOTES
[facility]    Medicine Progress Note - Hospitalist Service    Date of Admission:  1/14/2024    Assessment & Plan   Liz Shankar is a 89 year old male admitted on 1/14/2024. He brought in by daughter due to weakness, frequent urination, and concerns that she cannot manage him at home. Continues to be a placement issue with concern for finances. Patient working with  to secure funding for LTC.      Failure to thrive  Unsafe living conditions  Daughter brought in because she felt like she needed a break and was worried about being able to care for him.  Patient did well with physical therapy, is independent with walker and did not have TCU needs. Patient ok for discharge per physical therapy/occupational therapy.  Does have mild dementia as below, but appears to have good insight at present into his condition and plan.    Patient agreeable to going home with cindyuther. Per daughter and per home care there are significant concerns about the condition of his home if he decides to return to his own home without his daughter.    - Patient's daughter refusing to take patient home and concerned about level of care. Patient's current home sounds to be in unlivable conditions due to sanitation concerns. Unable to discharge home given new circumstances and unable to take care of himself alone with daughter refusing to take him into her home.   - LTC referrals placed, awaiting placement once finances are secured.     Hypotension  Patient with persistently low BP while inpatient with readings as low as 60/20's (right arm) while remaining asymptomatic. Likely right arm with falsely low BP reading given patient's lack of symptoms. Patient's recheck consistently 90's/40-50s while remaining asymptomatic of dizziness or orthostasis with ambulation. Possible interaction with Flomax for hypotension, however patient's BP low prior to initiating this medication 01/16. Improved while off Metoprolol. Patient  continues to be asymptomatic.   - Holding metoprolol   - Encourage PO intake  - Please use left arm for BP readings     Frequent urination  Known history of BPH  No dysuria and UA was negative.  Post void residual 100. UA negative.    - Adult depends in place  - No medications prior, may benefit from Flomax given nocturia and frequency of report urinations  - Continue Flomax 0.4 mg daily (new this admission)    Dementia  On problem list but not clear how advanced.  No report of any changes in mental status.  Patient pretty clear and good here.  No confusion.  SLUMS 24, consistent with mild dementia.      Chronic atrial fibrillation on anticoagulation   On apixaban and metoprolol. Good rate control here. Continued   - patient says not falling more than once per month, at present benefit probably outweighs risk but will need to continue to monitor risk/benefit of this and reassess with primary care provider and cardiology at follow-up.    - Holding metoprolol currently, remains with HR <100 BPM     Essential hypertension   Blood pressure low-normal, continue metoprolol for now, but may need to reassesses going forward if blood pressure too low on this in context of aortic stenosis.      - Holding metoprolol given persistent hypotension      Chronic systolic heart failure  Severe Aortic stenosis   Does not look like exacerbation, but last echo from our records in 9/2022 showed LVEF 40-45%, mildly decreased RV function, moderate to severe low flow low gradient aortic stenosis.. care everywhere shows echo from 1/5/23 which showed severe aortic stenosis, LVEF 40-45%, global hypokinesis. He was last seen by cardiology in 2/15/23 at which point they recommended TAVR. He had previously already been approved but then changed his mind.  Decided at 2/15 visit that he wasn't having significant symptoms so no longer wanted to pursue TAVR unless his symptoms worsened.  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    At baseline. Does limit mobility but able to get around with a walker.     Continued home prn tylenol and diclofenac gel.        History of constipation   Continue home prn medications         Observation Goals: -diagnostic tests and consults completed and resulted, -vital signs normal or at patient baseline, -tolerating oral intake to maintain hydration, -dyspnea improved and O2 sats greater than 88% on room air or prior home oxygen levels, -returns to baseline functional status, -safe disposition plan has been identified, Nurse to notify provider when observation goals have been met and patient is ready for discharge.  Diet: Advance Diet as Tolerated: Regular Diet Adult  Diet    DVT Prophylaxis: DOAC  Felipe Catheter: Not present  Lines: None     Cardiac Monitoring: None  Code Status: Full Code      Clinically Significant Risk Factors Present on Admission               # Drug Induced Coagulation Defect: home medication list includes an anticoagulant medication    # Hypertension: Noted on problem list  # Heart failure, NOS: heart failure noted on the problem list and last echo with EF 40-50%         # Financial/Environmental Concerns: none         Disposition Plan      Expected Discharge Date: 01/22/2024    Discharge Delays: *Medically Ready for Discharge  Social/Family Delay  Placement - LTC  Destination: home with family;home with help/services              Gallito Ricardo MD  Hospitalist Service  [facility]  Securely message with [employer] (more info)  Text page via [employer] Paging/Directory   ______________________________________________________________________    Interval History   No acute events overnight. Patient was resting comfortably in bed following breakfast. Patient had no complaints      Physical Exam   Vital Signs: Temp: 98.1  F (36.7  C) Temp src: Oral BP: (!)  88/36 Pulse: 101   Resp: 16 SpO2: 96 % O2 Device: None (Room air)    Weight: 165 lbs 0 oz    Constitutional: awake, alert, cooperative, no apparent distress, and appears stated age  Respiratory: No increased work of breathing, good air exchange, clear to auscultation bilaterally, no crackles or wheezing  Cardiovascular: Normal apical impulse, regular rate and rhythm, normal S1 and S2, no S3 or S4, and no murmur noted  GI: No scars, normal bowel sounds, soft, non-distended, non-tender, no masses palpated, no hepatosplenomegally      Medical Decision Making       35 MINUTES SPENT BY ME on the date of service doing chart review, history, exam, documentation & further activities per the note.      Data         Imaging results reviewed over the past 24 hrs:   No results found for this or any previous visit (from the past 24 hour(s)).

## 2024-01-20 NOTE — PLAN OF CARE
"1/19/24  [id number]  Problem: Adult Inpatient Plan of Care  Goal: Plan of Care Review  Outcome: Progressing  Goal: Optimal Comfort and Wellbeing  Outcome: Progressing  Intervention: Provide Person-Centered Care  Recent Flowsheet Documentation  Taken 1/20/2024 0000 by Sally Farias RN  Trust Relationship/Rapport: care explained     Problem: Urinary Elimination Management  Goal: Effective Urinary Elimination/Continence  Outcome: Met     Problem: Adult Inpatient Plan of Care  Goal: Absence of Hospital-Acquired Illness or Injury  Intervention: Identify and Manage Fall Risk  Recent Flowsheet Documentation  Taken 1/20/2024 0000 by Sally Farias RN  Safety Promotion/Fall Prevention:   nonskid shoes/slippers when out of bed   activity supervised  Intervention: Prevent Skin Injury  Recent Flowsheet Documentation  Taken 1/20/2024 0000 by Sally Farias RN  Body Position: position changed independently     Problem: Fall Injury Risk  Goal: Absence of Fall and Fall-Related Injury  Intervention: Identify and Manage Contributors  Recent Flowsheet Documentation  Taken 1/20/2024 0000 by Sally Farias RN  Medication Review/Management: medications reviewed  Intervention: Promote Injury-Free Environment  Recent Flowsheet Documentation  Taken 1/20/2024 0000 by Sally Farias RN  Safety Promotion/Fall Prevention:   nonskid shoes/slippers when out of bed   activity supervised   Goal Outcome Evaluation:       /70 (BP Location: Right leg)   Pulse 93   Temp 97.5  F (36.4  C) (Oral)   Resp 16   Ht 1.803 m (5' 11\")   Wt 74.8 kg (165 lb)   SpO2 99%   BMI 23.01 kg/m          Pt noted to take frequent trips to the bathroom overnight. Gait steady. Upgraded to independent in room. Pt reports urgency to have BM without success. Passing gas. Prune juice given. Walked with Pt in halls.         "

## 2024-01-20 NOTE — PLAN OF CARE
Problem: Adult Inpatient Plan of Care  Goal: Absence of Hospital-Acquired Illness or Injury  Intervention: Identify and Manage Fall Risk  Recent Flowsheet Documentation  Taken 1/19/2024 1700 by Dalila Chan RN  Safety Promotion/Fall Prevention:   nonskid shoes/slippers when out of bed   activity supervised     Problem: Urinary Elimination Management  Goal: Effective Urinary Elimination/Continence  Outcome: Progressing     Problem: Fall Injury Risk  Goal: Absence of Fall and Fall-Related Injury  Outcome: Progressing  Intervention: Identify and Manage Contributors  Recent Flowsheet Documentation  Taken 1/19/2024 1700 by Dalila Chan RN  Medication Review/Management: medications reviewed  Intervention: Promote Injury-Free Environment  Recent Flowsheet Documentation  Taken 1/19/2024 1700 by Dalila Chan RN  Safety Promotion/Fall Prevention:   nonskid shoes/slippers when out of bed   activity supervised   Goal Outcome Evaluation:    Patient is stand-by assist with walker. Up frequently to void and feels like he needs to have a BM, sometimes calling just minutes after getting back into bed. No BM this shift.   PRN tylenol given for knee pain. Voltaren gel applied.  Patient is alert and oriented.

## 2024-01-20 NOTE — PLAN OF CARE
Problem: Adult Inpatient Plan of Care  Goal: Absence of Hospital-Acquired Illness or Injury  Intervention: Prevent Skin Injury  Recent Flowsheet Documentation  Taken 1/20/2024 9462 by Belen Robles RN  Body Position: position changed independently     Problem: Adult Inpatient Plan of Care  Goal: Optimal Comfort and Wellbeing  Outcome: Progressing   Goal Outcome Evaluation:      Plan of Care Reviewed With: patient    Overall Patient Progress: improvingOverall Patient Progress: improving    Patient up independently in room to void and is very frequent, c/o 2/10 bilateral knee pain, is alert and oriented though is forgetful, up to the chair for dinner, able to make needs known

## 2024-01-21 NOTE — PLAN OF CARE
Problem: Adult Inpatient Plan of Care  Goal: Absence of Hospital-Acquired Illness or Injury  Intervention: Identify and Manage Fall Risk  Recent Flowsheet Documentation  Taken 1/21/2024 0830 by Amanda Rodríguez RN  Safety Promotion/Fall Prevention:   safety round/check completed   mobility aid in reach   nonskid shoes/slippers when out of bed   room door open   clutter free environment maintained   assistive device/personal items within reach     Problem: Adult Inpatient Plan of Care  Goal: Absence of Hospital-Acquired Illness or Injury  Intervention: Prevent Skin Injury  Recent Flowsheet Documentation  Taken 1/21/2024 0830 by Amanda Rodríguez RN  Body Position: position changed independently     Problem: Adult Inpatient Plan of Care  Goal: Optimal Comfort and Wellbeing  Intervention: Monitor Pain and Promote Comfort  Recent Flowsheet Documentation  Taken 1/21/2024 1411 by Amanda Rodríguez RN  Pain Management Interventions:   medication (see MAR)   heat applied  Taken 1/21/2024 1306 by Amanda Rodríguez RN  Pain Management Interventions: (voltran per patient request) medication (see MAR)   Goal Outcome Evaluation:      Plan of Care Reviewed With: patient    Overall Patient Progress: no changeOverall Patient Progress: no change    Pt A&Ox4. Denies pain but states pain. PRN medication given per MAR with stated relief.  Pt up frequently to void and attempt daily BM. Small hard BM noted but pt still frequently up.  Encouraged to sit and wait for a moment on the toilet.  Reinforcement needed.     SBA1 walker due to fluid treatments. Pt refuses to wait for GB and is alert and steady on his feet.    Fluid bolus given per MAR for soft BP. Pt denies dysnea on exertion

## 2024-01-21 NOTE — PLAN OF CARE
Goal Outcome Evaluation:  Patient up independently with walker and no falls writer's shift in room or hallwats.  Problem: Adult Inpatient Plan of Care  Goal: Absence of Hospital-Acquired Illness or Injury  Outcome: Progressing     Problem: Adult Inpatient Plan of Care  Goal: Absence of Hospital-Acquired Illness or Injury  Outcome: Progressing

## 2024-01-21 NOTE — PROGRESS NOTES
[facility]    Medicine Progress Note - Hospitalist Service    Date of Admission:  1/14/2024    Assessment & Plan   Liz Shankar is a 89 year old male admitted on 1/14/2024. He brought in by daughter due to weakness, frequent urination, and concerns that she cannot manage him at home. Continues to be a placement issue with concern for finances. Patient working with  to secure funding for LTC.      Failure to thrive  Unsafe living conditions  Daughter brought in because she felt like she needed a break and was worried about being able to care for him.  Patient did well with physical therapy, is independent with walker and did not have TCU needs. Patient ok for discharge per physical therapy/occupational therapy.  Does have mild dementia as below, but appears to have good insight at present into his condition and plan.    Patient agreeable to going home with dauther. Per daughter and per home care there are significant concerns about the condition of his home if he decides to return to his own home without his daughter.    - Patient's daughter refusing to take patient home and concerned about level of care. Patient's current home sounds to be in unlivable conditions due to sanitation concerns. Unable to discharge home given new circumstances and unable to take care of himself alone with daughter refusing to take him into her home.   - LTC referrals placed, awaiting placement once finances are secured.     Hypotension  Bp consistently 90's/40-50s while remaining asymptomatic of dizziness or orthostasis with ambulation. Flomax started 1/16 in hospital.   BP continues to be <100. -111  -stop flomax.( Post void residual was only 100). Resume metoprolol with holding parameters. Give 500cc  fluid bolus.      Frequent urination  Known history of BPH  No dysuria and UA was negative.  Post void residual 100. UA negative.  Flomax  was started 1/16 given nocturia and frequency of report  urinations but having hypotension so will stop it. Adult depends in place  Stop flomax    Dementia  On problem list but not clear how advanced.  No report of any changes in mental status.  Patient pretty clear and good here.  No confusion.  SLUMS 24, consistent with mild dementia. Pt awake/alert and seems to be capable of making his decisions-says he makes all his decisions.      Chronic atrial fibrillation on anticoagulation   On apixaban and metoprolol.   -Resume metoprolol     Essential hypertension   Blood pressure low-normal, continue metoprolol for now. Stop flomax.      Chronic systolic heart failure  Severe Aortic stenosis   Does not look like exacerbation, but last echo from our records in 9/2022 showed LVEF 40-45%, mildly decreased RV function, moderate to severe low flow low gradient aortic stenosis.. care everywhere shows echo from 1/5/23 which showed severe aortic stenosis, LVEF 40-45%, global hypokinesis. He was last seen by cardiology in 2/15/23 at which point they recommended TAVR. He had previously already been approved but then changed his mind.  Decided at 2/15 visit that he wasn't having significant symptoms so no longer wanted to pursue TAVR unless his symptoms worsened.  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    At baseline. Does limit mobility but able to get around with a walker.     Continued home prn tylenol and diclofenac gel.        History of constipation   Continue home prn medications         Observation Goals: -diagnostic tests and consults completed and resulted, -vital signs normal or at patient baseline, -tolerating oral intake to maintain hydration, -dyspnea improved and O2 sats greater than 88% on room air or prior home oxygen levels, -returns to baseline functional status, -safe disposition plan has been identified, Nurse to notify  provider when observation goals have been met and patient is ready for discharge.  Diet: Advance Diet as Tolerated: Regular Diet Adult  Diet    DVT Prophylaxis: DOAC  Felipe Catheter: Not present  Lines: None     Cardiac Monitoring: None  Code Status: Full Code      Clinically Significant Risk Factors Present on Admission               # Drug Induced Coagulation Defect: home medication list includes an anticoagulant medication    # Hypertension: Noted on problem list  # Heart failure, NOS: heart failure noted on the problem list and last echo with EF 40-50%         # Financial/Environmental Concerns: none         Disposition Plan     Expected Discharge Date: 01/22/2024    Discharge Delays: *Medically Ready for Discharge  Social/Family Delay  Placement - LTC  Destination: home with family;home with help/services              Gallito Ricardo MD  Hospitalist Service  [facility]  Securely message with [employer] (more info)  Text page via [employer] Paging/Directory   ______________________________________________________________________    Interval History   Has mild stomach upset. No pain-diarrhea -mild  constipation    Physical Exam   Vital Signs: Temp: 97.2  F (36.2  C) Temp src: Oral BP: 97/50 Pulse: 111   Resp: 16 SpO2: 97 % O2 Device: None (Room air)    Weight: 165 lbs 0 oz    Constitutional: awake, alert, cooperative, no apparent distress, and appears stated age  Respiratory: No increased work of breathing, good air exchange, clear to auscultation bilaterally, no crackles or wheezing  Cardiovascular: Normal apical impulse, regular rate and rhythm, normal S1 and S2, no S3 or S4, and no murmur noted  GI: No scars, normal bowel sounds, soft, non-distended, non-tender, no masses palpated, no hepatosplenomegally      Medical Decision Making       35 MINUTES SPENT BY ME on the date of service doing chart review, history, exam, documentation & further activities per the note.      Data         Imaging  results reviewed over the past 24 hrs:   No results found for this or any previous visit (from the past 24 hour(s)).

## 2024-01-22 NOTE — DISCHARGE SUMMARY
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  Reassess prostate   symptoms and blood pressure at that time.   Follow-up with cardiology next month as planned.            Discharge Disposition   Discharged to long-term care facility  Condition at discharge: Stable    Hospital Course   Liz Shankar is a 89 year old male with past medical history of BPH, dementia, Afib an anticoagulation, Aortic stenosis previously declined TAVR, and chronic systolic heart failure EF 40-45% admitted on 1/14/2024 after he was brought in by daughter due to weakness, frequent urination, and concerns that she cannot manage him at home. Hospitalization prolonged secondary to placement challenges.      # Failure to thrive  # Unsafe living conditions  Daughter brought in because she was no longer donnie to care for him. Able to pass PT and OT here but with patients dementia and concerns about sanitation at his prior residence, pursued placement at LTC.    # Hypotension, improved   Some lower blood pressures into 90's systolic during hospitalization but asymptomatic. Metoprolol dosing was reduced with some improvement. tamsulosin  started during admission was also stopped.      # Frequent  urination  # Known history of BPH  No dysuria and UA clean. Post void residual 100. Tasulosin started to try and improve symptoms but stopped given concern for contribution to hypotension.    # Dementia  SLUMS 24, consistent with mild dementia. Noted that patient seems to have borderline capacity.      # Chronic atrial fibrillation on anticoagulation   On apixaban and metoprolol.   - Metoprolol dose reduced from 50 to 25mg qd with recurrent hypotension  - Apixaban continued      # Hx of Essential hypertension      # Chronic systolic heart failure  # Severe Aortic stenosis   No exacerbation during admission, but last echo from our records in 9/2022 showed LVEF 40-45%, mildly decreased RV function, moderate to severe low flow low gradient aortic stenosis. Care everywhere shows echo from 1/5/23 which showed severe aortic stenosis, LVEF 40-45%, global hypokinesis. He was last seen by cardiology in 2/15/23 at which point they recommended TAVR. He had previously already been approved but then changed his mind. Decided at 2/15 visit that he wasn't having significant symptoms so no longer wanted to pursue TAVR unless his symptoms worsened.  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    At baseline. Does limit mobility but able to get around with a walker.     Continued home prn tylenol and diclofenac gel.        # History of constipation   Continue home prn medications      Consultations This Hospital Stay   PHYSICAL THERAPY ADULT IP CONSULT  OCCUPATIONAL THERAPY ADULT IP CONSULT  SOCIAL WORK IP CONSULT  CARE MANAGEMENT / SOCIAL WORK IP CONSULT  SPIRITUAL HEALTH SERVICES IP CONSULT  SPIRITUAL HEALTH SERVICES IP CONSULT    Code Status   Full Code    Time Spent on this Encounter   I, Tacho Aguirre MD, personally saw the patient today and spent greater than 30 minutes discharging  this patient.       Tacho Aguirre MD  [facility]  [address] [facility] [address]  Phone: [phone]  Fax: [phone]  ______________________________________________________________________    Physical Exam   Vital Signs: Temp: 97.8  F (36.6  C) Temp src: Oral BP: 107/46 Pulse: 84   Resp: 18 SpO2: 93 % O2 Device: None (Room air)    Weight: 162 lbs 14.72 oz  General Appearance: Awake and alert, laying back in bed, in no acute distress   Respiratory: Breathing easily on room air   Cardiovascular: Extremities warm and well perfused   GI: Abdomen soft, non-tender, and non-distended   Skin: No rashes or lesions   Other: Appropriate mood and affect, no focal deficits appreciated         Primary Care Physician   Luis Mcclellan    Discharge Orders      Follow-up and recommended labs and tests     Follow-up with your primary care provider in 1 week.  Reassess prostate symptoms and blood pressure at that time.   Follow-up with cardiology next month as planned.     Activity    Your activity upon discharge: up with walker     General info for SNF    Length of Stay Estimate: Long Term Care  Condition at Discharge: Improving  Level of care:skilled   Rehabilitation Potential: Good  Admission H&P remains valid and up-to-date: Yes  Recent Chemotherapy: N/A  Use Nursing Home Standing Orders: Yes     Reason for your hospital stay    You were hospitalized with difficulty managing at home and are being discharged from the hospital with additional services     Diet    Follow this diet upon discharge: Regular Diet Adult       Significant Results and Procedures   Results for orders placed or performed during the hospital encounter of 01/13/24   CT Abdomen Pelvis w Contrast    Narrative    EXAM: CT ABDOMEN PELVIS W CONTRAST  LOCATION: [facility]  DATE: 1/13/2024    INDICATION: abdominal pain  COMPARISON: CT 01/05/2024  TECHNIQUE: CT scan of the abdomen and pelvis  was performed following injection of IV contrast. Multiplanar reformats were obtained. Dose reduction techniques were used.  CONTRAST: Isovue  370 90mL    FINDINGS:   LOWER CHEST: Mild interlobular septal thickening, stable to slightly increased in comparison to prior CT. No airspace consolidation or pleural effusion.    HEPATOBILIARY: Likely small benign hemangiomas, stable since at least 2022, no specific follow-up recommended. No biliary obstruction.    PANCREAS: Normal.    SPLEEN: Normal.    ADRENAL GLANDS: Normal.    KIDNEYS/BLADDER: No hydronephrosis. Urinary bladder is unremarkable.    BOWEL: Diverticulosis of the colon. No acute inflammatory change. No obstruction.     LYMPH NODES: No suspicious lymphadenopathy.    VASCULATURE: Moderate calcified atherosclerosis. No abdominal aortic aneurysm.    PELVIC ORGANS: Normal.    MUSCULOSKELETAL: No acute bony abnormality.      Impression    IMPRESSION:   1.  No visualized explanation for patient's abdominal pain.  2.  Subtle interlobular septal thickening in the lung bases, differential includes mild pulmonary edema and fibrosis.       Discharge Medications   Current Discharge Medication List        CONTINUE these medications which have CHANGED    Details   apixaban ANTICOAGULANT (ELIQUIS) 2.5 MG tablet Take 2 tablets (5 mg) by mouth 2 times daily for 30 doses  Qty: 60 tablet, Refills: 0    Associated Diagnoses: Atrial fibrillation with RVR (H)      metoprolol succinate ER (TOPROL XL) 25 MG 24 hr tablet Take 1 tablet (25 mg) by mouth daily  Qty: 30 tablet, Refills: 0    Associated Diagnoses: Essential (primary) hypertension           CONTINUE these medications which have NOT CHANGED    Details   acetaminophen (TYLENOL) 325 MG tablet Take 2 tablets (650 mg) by mouth every 4 hours as needed for mild pain, other, fever or headaches (and adjunct with moderate or severe pain or per patient request)    Associated Diagnoses: Closed nondisplaced fracture of medial wall of  right acetabulum, initial encounter (H)      amoxicillin (AMOXIL) 500 MG tablet Take 4 tablets by mouth as needed TAKE 4 TABLET BY MOUTH 1 HOUR PRIOR TO DENTAL APPOINTMENT      calcium carbonate (TUMS) 500 MG chewable tablet Take 1 chew tab by mouth 2 times daily as needed for heartburn      diclofenac (VOLTAREN) 1 % topical gel Apply 2 g topically 4 times daily      docusate sodium (COLACE) 100 MG tablet Take 100 mg by mouth daily as needed for constipation      nitroGLYcerin (NITROSTAT) 0.4 MG sublingual tablet Place 0.4 mg under the tongue every 5 minutes as needed      polyethylene glycol (MIRALAX) 17 GM/Dose powder Take 17 g (1 Capful) by mouth daily as needed for constipation (Stop if you are having diarrhea.)  Qty: 170 g, Refills: 0           STOP taking these medications       bismuth subsalicylate (PEPTO BISMOL) 262 MG/15ML suspension Comments:   Reason for Stopping:         warfarin ANTICOAGULANT (COUMADIN) 5 MG tablet Comments:   Reason for Stopping:             Allergies   No Known Allergies

## 2024-01-22 NOTE — PROGRESS NOTES
Care Management Discharge Note    Discharge Date: 01/22/24    Discharge Disposition: Long Term Care at [facility] (Admissions Phone: [phone] Main Phone: [phone] Fax: [phone])    Discharge Services: None    Discharge DME: None    Discharge Transportation:      Private pay costs discussed: private room/amenity fees    Does the patient's insurance plan have a 3 day qualifying hospital stay waiver?  Yes     Which insurance plan 3 day waiver is available? Alternative insurance waiver    Will the waiver be used for post-acute placement? No    PAS Confirmation Code:  [id number]  Patient/family educated on Medicare website which has current facility and service quality ratings: other (see comments) (N/A)    Education Provided on the Discharge Plan: Yes  Persons Notified of Discharge Plans: Patient, brother Bairon & [facility]  Patient/Family in Agreement with the Plan: yes    Handoff Referral Completed: Yes    Additional Information:  Per IDT rounds, MD states that pt is medically stable for discharge.  Pt has been medically stable since 1/15/24, but needing assistance with power of  forms.    Pt is accepted for LTC at [facility] (Admissions Phone: [phone] Main Phone: [phone] Fax: [phone]), but per kavita Reyes, they require a health care directive & [place] to [place] to ensure payment for LTC.  Also requires $7K downpayment and financial statement showing capability of paying 3 months of private pay funds.    Today, pt's brother, Bairon & his wife, arrived from [facility], met with pt & SW at bedside to discuss above.     Pt and brother completed the HCD from & hospital staff notarized the document.  SW provided pt & Bairon with a list of mobile notaries that can complete the [place] form virtually or in person at the hospital.  Bairon able to secure a Notary to come in person before 3pm for completion of POA forms.    YVONNE informed Amy at  [facility] that pt is completing tasks above.  Amy able to accept the pt into cares today, if he arrives before 6pm.    YVONNE informed pt/Bairon & MD that pt can discharge today once [place] forms are complete.  YVONNE requested MD sign discharge orders for long term care placement.    Transportation discussed and Bairon will to transport today.        RASHID Herr

## 2024-01-22 NOTE — PLAN OF CARE
Problem: Fall Injury Risk  Goal: Absence of Fall and Fall-Related Injury  Outcome: Progressing   Goal Outcome Evaluation: Pt has been up frequently to BR, also ambulated in [place] twice w/ SBA & walker. Sat out in the [place] visiting with staff for a short time last evening. Gait has been steady & pt has been calling appropriately. Received prn tylenol & voltaren cream x2 for bilat knee pain w/ good relief. Pt has been fixated on his bowels wanting to have a BM. Given prn miralax & prune juice.

## 2024-01-22 NOTE — PLAN OF CARE
Plan of Care Reviewed With: patient    Overall Patient Progress: no change    Pt A&O. Able to make needs known. Denies pain but endorses BL knee aching. PRN medications given.      Pt steady on feet and compliant. Per MD consult, able to have independent in room.     Problem: Fall Injury Risk  Goal: Absence of Fall and Fall-Related Injury  Intervention: Promote Injury-Free Environment  Recent Flowsheet Documentation  Taken 1/22/2024 0844 by Amanda Rodríguez RN  Safety Promotion/Fall Prevention:   activity supervised   safety round/check completed   clutter free environment maintained   mobility aid in reach   nonskid shoes/slippers when out of bed

## 2024-01-22 NOTE — PROGRESS NOTES
"SPIRITUAL HEALTH SERVICES  SPIRITUAL ASSESSMENT Progress Note  [facility] - Med Surg    Referral Source: Patient request    I shared a reflective conversation with Liz, his brother, Bairon, and Bairon's wife Lanny which incorporated elements of illness and family narrative along with spiritual history.    - Liz had requested a  visit multiple times today.  When I visited he asked if I had heard of a devotional called, \"Portals of Prayer\" and a magazine called \"In Touch.\"  He requested the chance to send me a copy of each so that I could make them available to patients who might need them.    - His brother Bairon stated that he and his wife used to live in the [facility] area and would be going down to Liz's home to check on some things for him.  Liz was concerned that he needed to get home because his bills \"were piling up\" and needed attention.    - LSW, Dalila, arrived to speak with the Liz and his family about disposition plans.  I excused myself and confirmed that I would return with my business card so Liz would have an address for sending the information he mentioned.    Plan:  will monitor patient's ongoing need for support during LOS.      Goyo Funes M.A., [facility]  Staff Chaplain [facility]  Office: [phone]  Pager [phone]    "

## 2024-01-22 NOTE — PROGRESS NOTES
[facility] DISCHARGE NOTE    Patient discharged to long term care facility at 3:46 PM via wheel chair. Accompanied by brother, sister-in-law, and staff. Discharge instructions reviewed with patient and family , opportunity offered to ask questions. Prescriptions sent to patients preferred pharmacy. All belongings sent with patient.    BRIJESH GARCIA RN

## 2024-01-23 PROBLEM — Z91.81 HISTORY OF FALLING: Status: ACTIVE | Noted: 2022-07-11

## 2024-01-23 PROBLEM — C44.90 MALIGNANT NEOPLASM OF SKIN: Status: ACTIVE | Noted: 2023-01-01

## 2024-01-23 NOTE — PROGRESS NOTES
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  Dementia  Chronic atrial fibrillation on anticoagulation  Chronic systolic heart failure with last EF of 40 to 45%  Severe aortic stenosis with patient declining TAVR in the past  BPH  Hypotension limiting ability of patient to tolerate medications  Knee pain with underlying history of bilateral surgeries on knees  Generalized weakness    Plan     Pt is admitted to long-term care  Apparently family felt that they could no longer manage his cares  A-fib was stable on his current regimen of metoprolol but due to persistent hypotension his metoprolol dose was reduced to 25 mg  He continues on his anticoagulation with apixaban  Blood pressures continue to be on the low side and he is not on any other antihypertensive  CHF stable with no evidence of volume overload  BPH symptoms with frequency noted but patient could not tolerate Flomax due to hypotension   he has been discharged with monitoring and follow-up with urology  Has knee pain bilaterally and uses topical pain gels and Tylenol  Apparently not a candidate for knee replacement because of severe aortic stenosis   will remain involved to look for appropriate placement for this elderly gentleman  Monitor weights and labs appropriately  Will await results of his cognitive testing with underlying history of dementia mood appears to be stable      History     Patient is a very pleasant 89 year old male who is admitted to LTC  Patient has multiple medical conditions including a history of BPH as well as chronic A-fib and congestive heart failure.  Course complicated by hypotension and falls.  His medication dosages were adjusted however his family felt they could no longer manage his cares at home  and he has been discharged to long-term care with advised to pursue proper placement    Past Medical & Surgical History     PAST MEDICAL HISTORY:   Past Medical History:   Diagnosis Date    Antiplatelet or antithrombotic long-term use     Arthritis     osteoarthritis    Atrial fibrillation (H)     Bilateral chronic knee pain     CAD (coronary artery disease)     Hypotension, unspecified hypotension type     Irregular heart beat       PAST SURGICAL HISTORY:   has a past surgical history that includes orthopedic surgery.      Past Social History     Reviewed,  reports that he has quit smoking. His smoking use included cigarettes. He has a 10 pack-year smoking history. He has never used smokeless tobacco. He reports that he does not drink alcohol and does not use drugs.    Family History     Reviewed, and family history is not on file.    Medication List     Current Outpatient Medications   Medication    acetaminophen (TYLENOL) 325 MG tablet    amoxicillin (AMOXIL) 500 MG tablet    apixaban ANTICOAGULANT (ELIQUIS) 2.5 MG tablet    calcium carbonate (TUMS) 500 MG chewable tablet    diclofenac (VOLTAREN) 1 % topical gel    docusate sodium (COLACE) 100 MG tablet    metoprolol succinate ER (TOPROL XL) 25 MG 24 hr tablet    nitroGLYcerin (NITROSTAT) 0.4 MG sublingual tablet    polyethylene glycol (MIRALAX) 17 GM/Dose powder     No current facility-administered medications for this visit.      MED REC REQUIRED  Post Medication Reconciliation Status:  Discharge medications reconciled, continue medications without change       Allergies     No Known Allergies    Review of Systems   A comprehensive review of 14 systems was done. Pertinent findings noted here and in history of present illness. All the rest negative.  Constitutional: Negative.  Negative for fever, chills, he has  activity change, appetite change and fatigue.   HENT: Negative for congestion and facial swelling.    Eyes: Negative for photophobia, redness and visual  "disturbance.   Respiratory: Negative for cough and chest tightness.    Cardiovascular: Negative for chest pain, palpitations and leg swelling.   Gastrointestinal: Negative for nausea, diarrhea, constipation, blood in stool and abdominal distention.   Genitourinary: Negative.  Has urinary frequency  Musculoskeletal: Has chronic arthritis of both knees with severe pain he uses topical pain gel for that  Skin: Negative.    Neurological: Negative for dizziness, tremors, syncope, weakness, light-headedness and headaches.   Hematological: Does not bruise/bleed easily.   Psychiatric/Behavioral: Negative.        Physical Exam   /70   Pulse 92   Temp 97.7  F (36.5  C)   Resp 18   Ht 1.803 m (5' 11\")   Wt 73.5 kg (162 lb)   SpO2 96%   BMI 22.59 kg/m       Constitutional: Oriented to person, place, and time and appears well-developed.   HEENT:  Normocephalic and atraumatic.  Eyes: Conjunctivae and EOM are normal. Pupils are equal, round, and reactive to light. No discharge.  No scleral icterus. Nose normal. Mouth/Throat: Oropharynx is clear and moist. No oropharyngeal exudate.    NECK: Normal range of motion. Neck supple. No JVD present. No tracheal deviation present. No thyromegaly present.   CARDIOVASCULAR: Normal rate, regular rhythm and intact distal pulses.  Exam reveals no gallop and no friction rub.  Systolic murmur present.  PULMONARY: Effort normal and breath sounds normal. No respiratory distress.No Wheezing or rales.  ABDOMEN: Soft. Bowel sounds are normal. No distension and no mass.  There is no tenderness. There is no rebound and no guarding. No HSM.  MUSCULOSKELETAL: Normal range of motion. Mild kyphosis, no tenderness.  Subjective concern of bilateral knee pain  LYMPH NODES: Has no cervical, supraclavicular, axillary and groin adenopathy.   NEUROLOGICAL: Alert and oriented to person, place, and time. No cranial nerve deficit.  Normal muscle tone. Coordination normal.   GENITOURINARY: Deferred " exam.  SKIN: Skin is warm and dry. No rash noted. No erythema. No pallor.   EXTREMITIES: No cyanosis, no clubbing, no edema. No Deformity.  PSYCHIATRIC: Normal mood, affect and behavior.      Lab Results     Recent Results (from the past 240 hour(s))   ECG 12-LEAD WITH MUSE ([facility])    Collection Time: 01/13/24  3:32 PM   Result Value Ref Range    Systolic Blood Pressure  mmHg    Diastolic Blood Pressure  mmHg    Ventricular Rate 92 BPM    Atrial Rate 98 BPM    MN Interval  ms    QRS Duration 88 ms     ms    QTc 430 ms    P Axis  degrees    R AXIS 53 degrees    T Axis 37 degrees    Interpretation ECG       Atrial fibrillation  Abnormal ECG  When compared with ECG of 05-JAN-2024 14:46,  No significant change was found  Confirmed by SEE ED PROVIDER NOTE FOR, ECG INTERPRETATION ([id number]),  EVAN GUERRERO ([id number]) on 1/13/2024 7:52:26 PM     Comprehensive metabolic panel    Collection Time: 01/13/24  3:43 PM   Result Value Ref Range    Sodium 137 135 - 145 mmol/L    Potassium 4.3 3.4 - 5.3 mmol/L    Carbon Dioxide (CO2) 29 22 - 29 mmol/L    Anion Gap 7 7 - 15 mmol/L    Urea Nitrogen 18.6 8.0 - 23.0 mg/dL    Creatinine 0.65 (L) 0.67 - 1.17 mg/dL    GFR Estimate 90 >60 mL/min/1.73m2    Calcium 9.8 8.8 - 10.2 mg/dL    Chloride 101 98 - 107 mmol/L    Glucose 96 70 - 99 mg/dL    Alkaline Phosphatase 42 40 - 150 U/L    AST 27 0 - 45 U/L    ALT 25 0 - 70 U/L    Protein Total 6.4 6.4 - 8.3 g/dL    Albumin 4.0 3.5 - 5.2 g/dL    Bilirubin Total 0.8 <=1.2 mg/dL   Magnesium    Collection Time: 01/13/24  3:43 PM   Result Value Ref Range    Magnesium 1.9 1.7 - 2.3 mg/dL   Lipase    Collection Time: 01/13/24  3:43 PM   Result Value Ref Range    Lipase 27 13 - 60 U/L   Lactic acid whole blood    Collection Time: 01/13/24  3:43 PM   Result Value Ref Range    Lactic Acid 1.4 0.7 - 2.0 mmol/L   Troponin T, High Sensitivity    Collection Time: 01/13/24  3:43 PM   Result Value Ref Range    Troponin T, High Sensitivity 47 (H) <=22 ng/L    CBC with platelets and differential    Collection Time: 01/13/24  3:43 PM   Result Value Ref Range    WBC Count 9.0 4.0 - 11.0 10e3/uL    RBC Count 3.73 (L) 4.40 - 5.90 10e6/uL    Hemoglobin 12.9 (L) 13.3 - 17.7 g/dL    Hematocrit 39.0 (L) 40.0 - 53.0 %     (H) 78 - 100 fL    MCH 34.6 (H) 26.5 - 33.0 pg    MCHC 33.1 31.5 - 36.5 g/dL    RDW 16.7 (H) 10.0 - 15.0 %    Platelet Count 330 150 - 450 10e3/uL    % Neutrophils 84 %    % Lymphocytes 5 %    % Monocytes 7 %    % Eosinophils 3 %    % Basophils 1 %    % Immature Granulocytes 0 %    NRBCs per 100 WBC 0 <1 /100    Absolute Neutrophils 7.6 1.6 - 8.3 10e3/uL    Absolute Lymphocytes 0.5 (L) 0.8 - 5.3 10e3/uL    Absolute Monocytes 0.6 0.0 - 1.3 10e3/uL    Absolute Eosinophils 0.2 0.0 - 0.7 10e3/uL    Absolute Basophils 0.1 0.0 - 0.2 10e3/uL    Absolute Immature Granulocytes 0.0 <=0.4 10e3/uL    Absolute NRBCs 0.0 10e3/uL   Extra Blue Top Tube    Collection Time: 01/13/24  3:44 PM   Result Value Ref Range    Hold Specimen JI    INR    Collection Time: 01/13/24  3:44 PM   Result Value Ref Range    INR 1.99 (H) 0.85 - 1.15   UA with Microscopic reflex to Culture    Collection Time: 01/13/24  5:31 PM    Specimen: Urine, Midstream   Result Value Ref Range    Color Urine Light Yellow Colorless, Straw, Light Yellow, Yellow    Appearance Urine Clear Clear    Glucose Urine Negative Negative mg/dL    Bilirubin Urine Negative Negative    Ketones Urine Negative Negative mg/dL    Specific Gravity Urine 1.010 1.005 - 1.030    Blood Urine Negative Negative    pH Urine 5.0 5.0 - 7.0    Protein Albumin Urine Negative Negative mg/dL    Urobilinogen Urine <2.0 <2.0 mg/dL    Nitrite Urine Negative Negative    Leukocyte Esterase Urine Negative Negative    Mucus Urine Present (A) None Seen /LPF    RBC Urine 1 <=2 /HPF    WBC Urine <1 <=5 /HPF   Troponin T, High Sensitivity (now)    Collection Time: 01/13/24  5:59 PM   Result Value Ref Range    Troponin T, High Sensitivity 48 (H)  <=22 ng/L   UA with Microscopic reflex to Culture    Collection Time: 01/14/24  7:03 PM    Specimen: Urine, Clean Catch   Result Value Ref Range    Color Urine Straw Colorless, Straw, Light Yellow, Yellow    Appearance Urine Clear Clear    Glucose Urine Negative Negative mg/dL    Bilirubin Urine Negative Negative    Ketones Urine Negative Negative mg/dL    Specific Gravity Urine 1.003 1.003 - 1.035    Blood Urine Negative Negative    pH Urine 7.0 5.0 - 7.0    Protein Albumin Urine Negative Negative mg/dL    Urobilinogen Urine Normal Normal, 2.0 mg/dL    Nitrite Urine Negative Negative    Leukocyte Esterase Urine Negative Negative    Bacteria Urine Few (A) None Seen /HPF    RBC Urine 1 <=2 /HPF    WBC Urine 1 <=5 /HPF   Basic metabolic panel    Collection Time: 01/14/24  7:13 PM   Result Value Ref Range    Sodium 136 135 - 145 mmol/L    Potassium 4.0 3.4 - 5.3 mmol/L    Chloride 99 98 - 107 mmol/L    Carbon Dioxide (CO2) 25 22 - 29 mmol/L    Anion Gap 12 7 - 15 mmol/L    Urea Nitrogen 13.5 8.0 - 23.0 mg/dL    Creatinine 0.60 (L) 0.67 - 1.17 mg/dL    GFR Estimate >90 >60 mL/min/1.73m2    Calcium 9.1 8.8 - 10.2 mg/dL    Glucose 152 (H) 70 - 99 mg/dL   INR    Collection Time: 01/14/24  7:13 PM   Result Value Ref Range    INR 2.14 (H) 0.85 - 1.15   CBC with platelets and differential    Collection Time: 01/14/24  7:13 PM   Result Value Ref Range    WBC Count 11.2 (H) 4.0 - 11.0 10e3/uL    RBC Count 3.69 (L) 4.40 - 5.90 10e6/uL    Hemoglobin 12.9 (L) 13.3 - 17.7 g/dL    Hematocrit 39.3 (L) 40.0 - 53.0 %     (H) 78 - 100 fL    MCH 35.0 (H) 26.5 - 33.0 pg    MCHC 32.8 31.5 - 36.5 g/dL    RDW 16.6 (H) 10.0 - 15.0 %    Platelet Count 318 150 - 450 10e3/uL    % Neutrophils 83 %    % Lymphocytes 5 %    % Monocytes 6 %    % Eosinophils 4 %    % Basophils 1 %    % Immature Granulocytes 1 %    NRBCs per 100 WBC 0 <1 /100    Absolute Neutrophils 9.4 (H) 1.6 - 8.3 10e3/uL    Absolute Lymphocytes 0.5 (L) 0.8 - 5.3 10e3/uL     Absolute Monocytes 0.6 0.0 - 1.3 10e3/uL    Absolute Eosinophils 0.5 0.0 - 0.7 10e3/uL    Absolute Basophils 0.1 0.0 - 0.2 10e3/uL    Absolute Immature Granulocytes 0.1 <=0.4 10e3/uL    Absolute NRBCs 0.0 10e3/uL   INR    Collection Time: 01/15/24  6:24 AM   Result Value Ref Range    INR 1.86 (H) 0.85 - 1.15   Extra Purple Top EDTA (LAB USE ONLY)    Collection Time: 01/15/24  6:24 AM   Result Value Ref Range    Hold Specimen JIC    Extra Green Top Tube (LAB USE ONLY)    Collection Time: 01/15/24  6:24 AM   Result Value Ref Range    Hold Specimen JIC            Electronically signed by    Nano Acevedo MD

## 2024-01-23 NOTE — LETTER
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/unsafe living conditions  Dementia  Chronic atrial fibrillation on anticoagulation  Chronic systolic heart failure with last EF of 40 to 45%  Severe aortic stenosis with patient declining TAVR in the past  BPH  Hypotension limiting ability of patient to tolerate medications  Knee pain with underlying history of bilateral surgeries on knees  Generalized weakness    Plan     Pt is admitted to long-term care  Apparently family felt that they could no longer manage his cares  A-fib was stable on his current regimen of metoprolol but due to persistent hypotension his metoprolol dose was reduced to 25 mg  He continues on his anticoagulation with apixaban  Blood pressures continue to be on the low side and he is not on any other antihypertensive  CHF stable with no evidence of volume overload  BPH symptoms with frequency noted but patient could not tolerate Flomax due to hypotension   he has been discharged with monitoring and follow-up with urology  Has knee pain bilaterally and uses topical pain gels and Tylenol  Apparently not a candidate for knee replacement because of severe aortic stenosis   will remain involved to look for appropriate placement for this elderly gentleman  Monitor weights and labs appropriately  Will await results of his cognitive testing with underlying history of dementia mood appears to be stable      History     Patient is a very pleasant 89 year old male who is admitted to LTC  Patient has multiple medical conditions including a history of BPH as well as chronic A-fib and congestive heart failure.  Course complicated by hypotension and falls.  His  medication dosages were adjusted however his family felt they could no longer manage his cares at home and he has been discharged to long-term care with advised to pursue proper placement    Past Medical & Surgical History     PAST MEDICAL HISTORY:   Past Medical History:   Diagnosis Date     Antiplatelet or antithrombotic long-term use      Arthritis     osteoarthritis     Atrial fibrillation (H)      Bilateral chronic knee pain      CAD (coronary artery disease)      Hypotension, unspecified hypotension type      Irregular heart beat       PAST SURGICAL HISTORY:   has a past surgical history that includes orthopedic surgery.      Past Social History     Reviewed,  reports that he has quit smoking. His smoking use included cigarettes. He has a 10 pack-year smoking history. He has never used smokeless tobacco. He reports that he does not drink alcohol and does not use drugs.    Family History     Reviewed, and family history is not on file.    Medication List     Current Outpatient Medications   Medication     acetaminophen (TYLENOL) 325 MG tablet     amoxicillin (AMOXIL) 500 MG tablet     apixaban ANTICOAGULANT (ELIQUIS) 2.5 MG tablet     calcium carbonate (TUMS) 500 MG chewable tablet     diclofenac (VOLTAREN) 1 % topical gel     docusate sodium (COLACE) 100 MG tablet     metoprolol succinate ER (TOPROL XL) 25 MG 24 hr tablet     nitroGLYcerin (NITROSTAT) 0.4 MG sublingual tablet     polyethylene glycol (MIRALAX) 17 GM/Dose powder     No current facility-administered medications for this visit.      MED REC REQUIRED  Post Medication Reconciliation Status:  Discharge medications reconciled, continue medications without change       Allergies     No Known Allergies    Review of Systems   A comprehensive review of 14 systems was done. Pertinent findings noted here and in history of present illness. All the rest negative.  Constitutional: Negative.  Negative for fever, chills, he has  activity change, appetite change  "and fatigue.   HENT: Negative for congestion and facial swelling.    Eyes: Negative for photophobia, redness and visual disturbance.   Respiratory: Negative for cough and chest tightness.    Cardiovascular: Negative for chest pain, palpitations and leg swelling.   Gastrointestinal: Negative for nausea, diarrhea, constipation, blood in stool and abdominal distention.   Genitourinary: Negative.  Has urinary frequency  Musculoskeletal: Has chronic arthritis of both knees with severe pain he uses topical pain gel for that  Skin: Negative.    Neurological: Negative for dizziness, tremors, syncope, weakness, light-headedness and headaches.   Hematological: Does not bruise/bleed easily.   Psychiatric/Behavioral: Negative.        Physical Exam   /70   Pulse 92   Temp 97.7  F (36.5  C)   Resp 18   Ht 1.803 m (5' 11\")   Wt 73.5 kg (162 lb)   SpO2 96%   BMI 22.59 kg/m       Constitutional: Oriented to person, place, and time and appears well-developed.   HEENT:  Normocephalic and atraumatic.  Eyes: Conjunctivae and EOM are normal. Pupils are equal, round, and reactive to light. No discharge.  No scleral icterus. Nose normal. Mouth/Throat: Oropharynx is clear and moist. No oropharyngeal exudate.    NECK: Normal range of motion. Neck supple. No JVD present. No tracheal deviation present. No thyromegaly present.   CARDIOVASCULAR: Normal rate, regular rhythm and intact distal pulses.  Exam reveals no gallop and no friction rub.  Systolic murmur present.  PULMONARY: Effort normal and breath sounds normal. No respiratory distress.No Wheezing or rales.  ABDOMEN: Soft. Bowel sounds are normal. No distension and no mass.  There is no tenderness. There is no rebound and no guarding. No HSM.  MUSCULOSKELETAL: Normal range of motion. Mild kyphosis, no tenderness.  Subjective concern of bilateral knee pain  LYMPH NODES: Has no cervical, supraclavicular, axillary and groin adenopathy.   NEUROLOGICAL: Alert and oriented to " person, place, and time. No cranial nerve deficit.  Normal muscle tone. Coordination normal.   GENITOURINARY: Deferred exam.  SKIN: Skin is warm and dry. No rash noted. No erythema. No pallor.   EXTREMITIES: No cyanosis, no clubbing, no edema. No Deformity.  PSYCHIATRIC: Normal mood, affect and behavior.      Lab Results     Recent Results (from the past 240 hour(s))   ECG 12-LEAD WITH MUSE ([facility])    Collection Time: 01/13/24  3:32 PM   Result Value Ref Range    Systolic Blood Pressure  mmHg    Diastolic Blood Pressure  mmHg    Ventricular Rate 92 BPM    Atrial Rate 98 BPM    OH Interval  ms    QRS Duration 88 ms     ms    QTc 430 ms    P Axis  degrees    R AXIS 53 degrees    T Axis 37 degrees    Interpretation ECG       Atrial fibrillation  Abnormal ECG  When compared with ECG of 05-JAN-2024 14:46,  No significant change was found  Confirmed by SEE ED PROVIDER NOTE FOR, ECG INTERPRETATION ([id number]),  EVAN GUERRERO ([id number]) on 1/13/2024 7:52:26 PM     Comprehensive metabolic panel    Collection Time: 01/13/24  3:43 PM   Result Value Ref Range    Sodium 137 135 - 145 mmol/L    Potassium 4.3 3.4 - 5.3 mmol/L    Carbon Dioxide (CO2) 29 22 - 29 mmol/L    Anion Gap 7 7 - 15 mmol/L    Urea Nitrogen 18.6 8.0 - 23.0 mg/dL    Creatinine 0.65 (L) 0.67 - 1.17 mg/dL    GFR Estimate 90 >60 mL/min/1.73m2    Calcium 9.8 8.8 - 10.2 mg/dL    Chloride 101 98 - 107 mmol/L    Glucose 96 70 - 99 mg/dL    Alkaline Phosphatase 42 40 - 150 U/L    AST 27 0 - 45 U/L    ALT 25 0 - 70 U/L    Protein Total 6.4 6.4 - 8.3 g/dL    Albumin 4.0 3.5 - 5.2 g/dL    Bilirubin Total 0.8 <=1.2 mg/dL   Magnesium    Collection Time: 01/13/24  3:43 PM   Result Value Ref Range    Magnesium 1.9 1.7 - 2.3 mg/dL   Lipase    Collection Time: 01/13/24  3:43 PM   Result Value Ref Range    Lipase 27 13 - 60 U/L   Lactic acid whole blood    Collection Time: 01/13/24  3:43 PM   Result Value Ref Range    Lactic Acid 1.4 0.7 - 2.0 mmol/L   Troponin T, High  Sensitivity    Collection Time: 01/13/24  3:43 PM   Result Value Ref Range    Troponin T, High Sensitivity 47 (H) <=22 ng/L   CBC with platelets and differential    Collection Time: 01/13/24  3:43 PM   Result Value Ref Range    WBC Count 9.0 4.0 - 11.0 10e3/uL    RBC Count 3.73 (L) 4.40 - 5.90 10e6/uL    Hemoglobin 12.9 (L) 13.3 - 17.7 g/dL    Hematocrit 39.0 (L) 40.0 - 53.0 %     (H) 78 - 100 fL    MCH 34.6 (H) 26.5 - 33.0 pg    MCHC 33.1 31.5 - 36.5 g/dL    RDW 16.7 (H) 10.0 - 15.0 %    Platelet Count 330 150 - 450 10e3/uL    % Neutrophils 84 %    % Lymphocytes 5 %    % Monocytes 7 %    % Eosinophils 3 %    % Basophils 1 %    % Immature Granulocytes 0 %    NRBCs per 100 WBC 0 <1 /100    Absolute Neutrophils 7.6 1.6 - 8.3 10e3/uL    Absolute Lymphocytes 0.5 (L) 0.8 - 5.3 10e3/uL    Absolute Monocytes 0.6 0.0 - 1.3 10e3/uL    Absolute Eosinophils 0.2 0.0 - 0.7 10e3/uL    Absolute Basophils 0.1 0.0 - 0.2 10e3/uL    Absolute Immature Granulocytes 0.0 <=0.4 10e3/uL    Absolute NRBCs 0.0 10e3/uL   Extra Blue Top Tube    Collection Time: 01/13/24  3:44 PM   Result Value Ref Range    Hold Specimen JIC    INR    Collection Time: 01/13/24  3:44 PM   Result Value Ref Range    INR 1.99 (H) 0.85 - 1.15   UA with Microscopic reflex to Culture    Collection Time: 01/13/24  5:31 PM    Specimen: Urine, Midstream   Result Value Ref Range    Color Urine Light Yellow Colorless, Straw, Light Yellow, Yellow    Appearance Urine Clear Clear    Glucose Urine Negative Negative mg/dL    Bilirubin Urine Negative Negative    Ketones Urine Negative Negative mg/dL    Specific Gravity Urine 1.010 1.005 - 1.030    Blood Urine Negative Negative    pH Urine 5.0 5.0 - 7.0    Protein Albumin Urine Negative Negative mg/dL    Urobilinogen Urine <2.0 <2.0 mg/dL    Nitrite Urine Negative Negative    Leukocyte Esterase Urine Negative Negative    Mucus Urine Present (A) None Seen /LPF    RBC Urine 1 <=2 /HPF    WBC Urine <1 <=5 /HPF   Troponin T,  High Sensitivity (now)    Collection Time: 01/13/24  5:59 PM   Result Value Ref Range    Troponin T, High Sensitivity 48 (H) <=22 ng/L   UA with Microscopic reflex to Culture    Collection Time: 01/14/24  7:03 PM    Specimen: Urine, Clean Catch   Result Value Ref Range    Color Urine Straw Colorless, Straw, Light Yellow, Yellow    Appearance Urine Clear Clear    Glucose Urine Negative Negative mg/dL    Bilirubin Urine Negative Negative    Ketones Urine Negative Negative mg/dL    Specific Gravity Urine 1.003 1.003 - 1.035    Blood Urine Negative Negative    pH Urine 7.0 5.0 - 7.0    Protein Albumin Urine Negative Negative mg/dL    Urobilinogen Urine Normal Normal, 2.0 mg/dL    Nitrite Urine Negative Negative    Leukocyte Esterase Urine Negative Negative    Bacteria Urine Few (A) None Seen /HPF    RBC Urine 1 <=2 /HPF    WBC Urine 1 <=5 /HPF   Basic metabolic panel    Collection Time: 01/14/24  7:13 PM   Result Value Ref Range    Sodium 136 135 - 145 mmol/L    Potassium 4.0 3.4 - 5.3 mmol/L    Chloride 99 98 - 107 mmol/L    Carbon Dioxide (CO2) 25 22 - 29 mmol/L    Anion Gap 12 7 - 15 mmol/L    Urea Nitrogen 13.5 8.0 - 23.0 mg/dL    Creatinine 0.60 (L) 0.67 - 1.17 mg/dL    GFR Estimate >90 >60 mL/min/1.73m2    Calcium 9.1 8.8 - 10.2 mg/dL    Glucose 152 (H) 70 - 99 mg/dL   INR    Collection Time: 01/14/24  7:13 PM   Result Value Ref Range    INR 2.14 (H) 0.85 - 1.15   CBC with platelets and differential    Collection Time: 01/14/24  7:13 PM   Result Value Ref Range    WBC Count 11.2 (H) 4.0 - 11.0 10e3/uL    RBC Count 3.69 (L) 4.40 - 5.90 10e6/uL    Hemoglobin 12.9 (L) 13.3 - 17.7 g/dL    Hematocrit 39.3 (L) 40.0 - 53.0 %     (H) 78 - 100 fL    MCH 35.0 (H) 26.5 - 33.0 pg    MCHC 32.8 31.5 - 36.5 g/dL    RDW 16.6 (H) 10.0 - 15.0 %    Platelet Count 318 150 - 450 10e3/uL    % Neutrophils 83 %    % Lymphocytes 5 %    % Monocytes 6 %    % Eosinophils 4 %    % Basophils 1 %    % Immature Granulocytes 1 %    NRBCs  per 100 WBC 0 <1 /100    Absolute Neutrophils 9.4 (H) 1.6 - 8.3 10e3/uL    Absolute Lymphocytes 0.5 (L) 0.8 - 5.3 10e3/uL    Absolute Monocytes 0.6 0.0 - 1.3 10e3/uL    Absolute Eosinophils 0.5 0.0 - 0.7 10e3/uL    Absolute Basophils 0.1 0.0 - 0.2 10e3/uL    Absolute Immature Granulocytes 0.1 <=0.4 10e3/uL    Absolute NRBCs 0.0 10e3/uL   INR    Collection Time: 01/15/24  6:24 AM   Result Value Ref Range    INR 1.86 (H) 0.85 - 1.15   Extra Purple Top EDTA (LAB USE ONLY)    Collection Time: 01/15/24  6:24 AM   Result Value Ref Range    Hold Specimen JIC    Extra Green Top Tube (LAB USE ONLY)    Collection Time: 01/15/24  6:24 AM   Result Value Ref Range    Hold Specimen JIC            Electronically signed by    Nano Acevedo MD                            Sincerely,        ALEJANDRA Hartley

## 2024-02-16 NOTE — LETTER
2/16/2024        RE: Liz Shankar  [address] [facility] [address]        [facility] GERIATR[facility]  Chief Complaint   Patient presents with     [facility] Regulatory     [place] Medical Record Number:  [id number]  Place of Service where encounter took place:  [facility] () [[id number]]    HPI:    Liz Shankar  is 89 year old (5/10/1934), who is being seen today for a federally mandated E/M visit. Today's concerns are:  1. Chronic diastolic CHF (congestive heart failure) (H)    2. Atrial fibrillation with RVR (H)    3. History of dementia    4. Chronic anticoagulation    5. History of falling    6. Aortic valve stenosis, etiology of cardiac valve disease unspecified      Nhi is seen today for federally mandated regulatory visit.  He is new to this facility, recently hospitalized at [facility] in January.  Family felt they were no longer able to care for him at home and he was having multiple falls with unclean and unsafe living environment.  He has a history of BPH, dementia, A-fib on Eliquis, aortic stenosis.  He was started on tamsulosin however this was discontinued due to concern it was contributing to hypotension.  No urinary complaints today.  He is being followed by cardiology for possible TAVR, has next appointment on March 8 for angiogram.  Per nursing, he has a new significant other who also lives in the nursing home.  Michelle is seen today sitting up in the hallway, he agrees to speak with me there as he does not want go back to his room.  He remains a full code.  He has had some mild weight gain which is desired, 162 pounds 267 pounds.  Slums score 24 out of 30.  He is alert and oriented to self, time, situation.  Very pleasant man.    ALLERGIES:Patient has no known allergies.  PAST MEDICAL HISTORY:   Past Medical History:   Diagnosis Date     Antiplatelet or antithrombotic long-term use      Arthritis     osteoarthritis     Atrial fibrillation (H)       Bilateral chronic knee pain      CAD (coronary artery disease)      Hypotension, unspecified hypotension type      Irregular heart beat      PAST SURGICAL HISTORY:   has a past surgical history that includes orthopedic surgery.  FAMILY HISTORY: family history is not on file.  SOCIAL HISTORY:  reports that he has quit smoking. His smoking use included cigarettes. He has a 10 pack-year smoking history. He has never used smokeless tobacco. He reports that he does not drink alcohol and does not use drugs.    MEDICATIONS:  MED REC REQUIRED  Post Medication Reconciliation Status:  Discharge medications reconciled, continue medications without change         Review of your medicines            Accurate as of February 16, 2024 11:59 PM. If you have any questions, ask your nurse or doctor.                CONTINUE these medicines which have NOT CHANGED        Dose / Directions   acetaminophen 325 MG tablet  Commonly known as: TYLENOL  Used for: Closed nondisplaced fracture of medial wall of right acetabulum, initial encounter (H)      Dose: 650 mg  Take 2 tablets (650 mg) by mouth every 4 hours as needed for mild pain, other, fever or headaches (and adjunct with moderate or severe pain or per patient request)  Refills: 0     amoxicillin 500 MG tablet  Commonly known as: AMOXIL  Used for: Chronic diastolic CHF (congestive heart failure) (H)      Dose: 2,000 mg  Take 4 tablets (2,000 mg) by mouth as needed TAKE 4 TABLET BY MOUTH 1 HOUR PRIOR TO DENTAL APPOINTMENT  Refills: 0     apixaban ANTICOAGULANT 2.5 MG tablet  Commonly known as: ELIQUIS  Used for: Atrial fibrillation with RVR (H)      Dose: 5 mg  Take 2 tablets (5 mg) by mouth 2 times daily  Refills: 0     calcium carbonate 500 MG chewable tablet  Commonly known as: TUMS  Used for: Adult failure to thrive      Dose: 1 chew tab  Take 1 tablet (500 mg) by mouth 2 times daily as needed for heartburn  Refills: 0     diclofenac 1 % topical gel  Commonly known as:  VOLTAREN  Used for: Adult failure to thrive      Dose: 2 g  Apply 2 g topically 4 times daily  Refills: 0     docusate sodium 100 MG tablet  Commonly known as: COLACE  Used for: Adult failure to thrive      Dose: 100 mg  Take 1 tablet (100 mg) by mouth daily as needed for constipation  Refills: 0     metoprolol succinate ER 25 MG 24 hr tablet  Commonly known as: TOPROL XL  Used for: Essential (primary) hypertension      Dose: 25 mg  Take 1 tablet (25 mg) by mouth daily  Refills: 0     nitroGLYcerin 0.4 MG sublingual tablet  Commonly known as: NITROSTAT  Used for: Chronic diastolic CHF (congestive heart failure) (H)      Dose: 0.4 mg  Place 1 tablet (0.4 mg) under the tongue every 5 minutes as needed for chest pain  Refills: 0     polyethylene glycol 17 GM/Dose powder  Commonly known as: MiraLax  Used for: Adult failure to thrive      Dose: 1 Capful  Take 17 g (1 Capful) by mouth daily as needed for constipation (Stop if you are having diarrhea.)  Refills: 0               Case Management:  I have reviewed the care plan and MDS and do agree with the plan. Patient's desire to return to the community is not present. Information reviewed:  Medications, vital signs, orders, and nursing notes.    ROS:  4 point ROS including Respiratory, CV, GI and , other than that noted in the HPI,  is negative    Vitals:  /65   Pulse 62   Temp (!) 96.4  F (35.8  C)   Resp 16   Wt 76.1 kg (167 lb 12.8 oz)   SpO2 94%   BMI 23.40 kg/m    Body mass index is 23.4 kg/m .  Exam:  General appearance: alert, cooperative.   Lungs: respirations unlabored  Extremities: extremities normal, atraumatic, no cyanosis or edema  Skin: No rashes or lesions  Neurologic: oriented. No focal deficits.   Psych: interacts well with caregivers,  pleasant      Lab/Diagnostic data:   Labs done in SNF are in [place] EPIC. Please refer to them using [employer] Everywhere.    ASSESSMENT/PLAN  1. Chronic diastolic CHF (congestive heart failure) (H)    2.  Atrial fibrillation with RVR (H)    3. History of dementia    4. Chronic anticoagulation    5. History of falling    6. Aortic valve stenosis, etiology of cardiac valve disease unspecified      Electronically signed by:  Charity Clay CNP            Sincerely,        Charity Clay, CNP

## 2024-02-16 NOTE — PROGRESS NOTES
[facility] GERIATRICS  Chief Complaint   Patient presents with    long term Regulatory     [facility] Medical Record Number:  [id number]  Place of Service where encounter took place:  [facility] () [[id number]]    HPI:    Liz Shankar  is 89 year old (5/10/1934), who is being seen today for a federally mandated E/M visit. Today's concerns are:  1. Chronic diastolic CHF (congestive heart failure) (H)    2. Atrial fibrillation with RVR (H)    3. History of dementia    4. Chronic anticoagulation    5. History of falling    6. Aortic valve stenosis, etiology of cardiac valve disease unspecified      Nhi is seen today for federally mandated regulatory visit.  He is new to this facility, recently hospitalized at [facility] in January.  Family felt they were no longer able to care for him at home and he was having multiple falls with unclean and unsafe living environment.  He has a history of BPH, dementia, A-fib on Eliquis, aortic stenosis.  He was started on tamsulosin however this was discontinued due to concern it was contributing to hypotension.  No urinary complaints today.  He is being followed by cardiology for possible TAVR, has next appointment on March 8 for angiogram.  Per nursing, he has a new significant other who also lives in the nursing home.  Michelle is seen today sitting up in the hallway, he agrees to speak with me there as he does not want go back to his room.  He remains a full code.  He has had some mild weight gain which is desired, 162 pounds 267 pounds.  Slums score 24 out of 30.  He is alert and oriented to self, time, situation.  Very pleasant man.    ALLERGIES:Patient has no known allergies.  PAST MEDICAL HISTORY:   Past Medical History:   Diagnosis Date    Antiplatelet or antithrombotic long-term use     Arthritis     osteoarthritis    Atrial fibrillation (H)     Bilateral chronic knee pain     CAD (coronary artery disease)     Hypotension, unspecified  hypotension type     Irregular heart beat      PAST SURGICAL HISTORY:   has a past surgical history that includes orthopedic surgery.  FAMILY HISTORY: family history is not on file.  SOCIAL HISTORY:  reports that he has quit smoking. His smoking use included cigarettes. He has a 10 pack-year smoking history. He has never used smokeless tobacco. He reports that he does not drink alcohol and does not use drugs.    MEDICATIONS:  MED REC REQUIRED  Post Medication Reconciliation Status:  Discharge medications reconciled, continue medications without change         Review of your medicines            Accurate as of February 16, 2024 11:59 PM. If you have any questions, ask your nurse or doctor.                CONTINUE these medicines which have NOT CHANGED        Dose / Directions   acetaminophen 325 MG tablet  Commonly known as: TYLENOL  Used for: Closed nondisplaced fracture of medial wall of right acetabulum, initial encounter (H)      Dose: 650 mg  Take 2 tablets (650 mg) by mouth every 4 hours as needed for mild pain, other, fever or headaches (and adjunct with moderate or severe pain or per patient request)  Refills: 0     amoxicillin 500 MG tablet  Commonly known as: AMOXIL  Used for: Chronic diastolic CHF (congestive heart failure) (H)      Dose: 2,000 mg  Take 4 tablets (2,000 mg) by mouth as needed TAKE 4 TABLET BY MOUTH 1 HOUR PRIOR TO DENTAL APPOINTMENT  Refills: 0     apixaban ANTICOAGULANT 2.5 MG tablet  Commonly known as: ELIQUIS  Used for: Atrial fibrillation with RVR (H)      Dose: 5 mg  Take 2 tablets (5 mg) by mouth 2 times daily  Refills: 0     calcium carbonate 500 MG chewable tablet  Commonly known as: TUMS  Used for: Adult failure to thrive      Dose: 1 chew tab  Take 1 tablet (500 mg) by mouth 2 times daily as needed for heartburn  Refills: 0     diclofenac 1 % topical gel  Commonly known as: VOLTAREN  Used for: Adult failure to thrive      Dose: 2 g  Apply 2 g topically 4 times daily  Refills: 0      docusate sodium 100 MG tablet  Commonly known as: COLACE  Used for: Adult failure to thrive      Dose: 100 mg  Take 1 tablet (100 mg) by mouth daily as needed for constipation  Refills: 0     metoprolol succinate ER 25 MG 24 hr tablet  Commonly known as: TOPROL XL  Used for: Essential (primary) hypertension      Dose: 25 mg  Take 1 tablet (25 mg) by mouth daily  Refills: 0     nitroGLYcerin 0.4 MG sublingual tablet  Commonly known as: NITROSTAT  Used for: Chronic diastolic CHF (congestive heart failure) (H)      Dose: 0.4 mg  Place 1 tablet (0.4 mg) under the tongue every 5 minutes as needed for chest pain  Refills: 0     polyethylene glycol 17 GM/Dose powder  Commonly known as: MiraLax  Used for: Adult failure to thrive      Dose: 1 Capful  Take 17 g (1 Capful) by mouth daily as needed for constipation (Stop if you are having diarrhea.)  Refills: 0               Case Management:  I have reviewed the care plan and MDS and do agree with the plan. Patient's desire to return to the community is not present. Information reviewed:  Medications, vital signs, orders, and nursing notes.    ROS:  4 point ROS including Respiratory, CV, GI and , other than that noted in the HPI,  is negative    Vitals:  /65   Pulse 62   Temp (!) 96.4  F (35.8  C)   Resp 16   Wt 76.1 kg (167 lb 12.8 oz)   SpO2 94%   BMI 23.40 kg/m    Body mass index is 23.4 kg/m .  Exam:  General appearance: alert, cooperative.   Lungs: respirations unlabored  Extremities: extremities normal, atraumatic, no cyanosis or edema  Skin: No rashes or lesions  Neurologic: oriented. No focal deficits.   Psych: interacts well with caregivers,  pleasant      Lab/Diagnostic data:   Labs done in SNF are in [place] EPIC. Please refer to them using [employer] Everywhere.    ASSESSMENT/PLAN  1. Chronic diastolic CHF (congestive heart failure) (H)    2. Atrial fibrillation with RVR (H)    3. History of dementia    4. Chronic anticoagulation    5. History of  falling    6. Aortic valve stenosis, etiology of cardiac valve disease unspecified      Electronically signed by:  Charity Clay CNP

## 2024-02-25 NOTE — ED NOTES
Bed: WWED-03  Expected date: 2/25/24  Expected time: 3:38 PM  Means of arrival: Ambulance  Comments:

## 2024-02-25 NOTE — TELEPHONE ENCOUNTER
Nursing called stating Liz's penis is uncircumcised, swollen and not able to retract.    Suspect possible infection.    Orders:  Cipro 500mg po BID x7 days for uncircumcised infection  Gently each day try to retract the foreskin to get it to function again.    JOHNNIE Singh CNP

## 2024-02-25 NOTE — DISCHARGE INSTRUCTIONS
After some ice was applied we were able to retract the foreskin  Please try to not retract the full skin fully for the next several days  Can continue with antibiotics as prescribed  Follow up as soon as possible with a urologist (they should call)  Return to the Emergency Room if the skin gets stuck again and you can't reduce it, unable to pee, fevers or other worsening symptoms

## 2024-02-25 NOTE — TELEPHONE ENCOUNTER
[facility]   2024     Name: Liz Shankar   : 5/10/1934     Penis remains swollen despite antibiotics. Nurse is concerned it may be cutting off circulation. Pink. Warm to touch. Painful. VSS. Able to move around. Able to urinate.    Continue to monitor. If condition worsens, update on-call     Electronically signed by JOHNNIE Ly CNP

## 2024-02-25 NOTE — ED NOTES
Introduced self to patient.  Whiteboard updated.  Plan of care and length of time discussed with patient.  Will continue to monitor. Zoey Arce RN.......2/25/2024 4:21 PM

## 2024-02-25 NOTE — ED TRIAGE NOTES
Pt arrives via EMS from care center. Swollen penis x 4 days. Pt is on cipro  x 2 days with no improvement. Denies fever. VSS, A/O, ABC intact.

## 2024-02-29 NOTE — LETTER
2/29/2024        RE: Liz Shankar  [address]  [facility] [address]        [facility] GERIATRIC SERVICES       Patient Liz Shankar  MRN: [id number]        Reason for Visit     Chief Complaint   Patient presents with     Pre-Op Exam       Code Status     CPR/Full code     Assessment     Adult failure to thrive/unsafe living conditions  Dementia  Chronic atrial fibrillation on anticoagulation  Chronic systolic heart failure with last EF of 40 to 45%  Severe aortic stenosis with patient declining TAVR in the past  BPH  Hypotension limiting ability of patient to tolerate medications  Knee pain with underlying history of bilateral surgeries on knees  Generalized weakness  paraphimosis    Plan     Pt is admitted to long-term care  Apparently family felt that they could no longer manage his cares  A-fib was stable on his current regimen of metoprolol but due to persistent hypotension his metoprolol dose was reduced to 25 mg  Hr stable  He continues on his anticoagulation with apixaban  Blood pressures continue to be on the low side and he is not on any other antihypertensive  CHF stable with no evidence of volume overload  BPH symptoms with frequency noted but patient could not tolerate Flomax due to hypotension   he has been discharged with monitoring and follow-up with urology  Has been seen in ER for paraphimosis and needs urology follow-up  On abx   No discharge noted  Has knee pain bilaterally and uses topical pain gels and Tylenol  Apparently not a candidate for knee replacement because of severe aortic stenosis  Continues to have knee pain.  Has had cognitive impairment with repetitive concerns  No other behaviors.  Now scheduled for angiogram and echo per cardiology and medically stable for procedure    History     Patient is a very pleasant 89 year old male who is admitted to LTC  Patient has multiple medical conditions including a history of BPH as well as chronic A-fib and congestive heart  failure.  Wts stable  HR stable and has some leg edema but otherwise no concerns  Ambulates without any device  Course complicated by hypotension and falls.  His medication dosages were adjusted however his family felt they could no longer manage his cares at home and he has been discharged to long-term care with advised to pursue proper placement  He and family looking at snow  Was seen in ER for paraphimosis  Swelling noted but denies any pain    Past Medical & Surgical History     PAST MEDICAL HISTORY:   Past Medical History:   Diagnosis Date     Antiplatelet or antithrombotic long-term use      Arthritis     osteoarthritis     Atrial fibrillation (H)      Bilateral chronic knee pain      CAD (coronary artery disease)      Hypotension, unspecified hypotension type      Irregular heart beat       PAST SURGICAL HISTORY:   has a past surgical history that includes orthopedic surgery.      Past Social History     Reviewed,  reports that he has quit smoking. His smoking use included cigarettes. He has a 10 pack-year smoking history. He has never used smokeless tobacco. He reports that he does not drink alcohol and does not use drugs.    Family History     Reviewed, and family history is not on file.    Medication List     Current Outpatient Medications   Medication     acetaminophen (TYLENOL) 325 MG tablet     amoxicillin (AMOXIL) 500 MG tablet     apixaban ANTICOAGULANT (ELIQUIS) 2.5 MG tablet     calcium carbonate (TUMS) 500 MG chewable tablet     ciprofloxacin (CIPRO) 500 MG tablet     diclofenac (VOLTAREN) 1 % topical gel     docusate sodium (COLACE) 100 MG tablet     metoprolol succinate ER (TOPROL XL) 25 MG 24 hr tablet     nitroGLYcerin (NITROSTAT) 0.4 MG sublingual tablet     polyethylene glycol (MIRALAX) 17 GM/Dose powder     No current facility-administered medications for this visit.      MED REC REQUIRED  Post Medication Reconciliation Status:          Allergies     No Known Allergies    Review of Systems  "  A comprehensive review of 14 systems was done. Pertinent findings noted here and in history of present illness. All the rest negative.  Constitutional: Negative.  Negative for fever, chills, he has  activity change, appetite change and fatigue.   HENT: Negative for congestion and facial swelling.    Eyes: Negative for photophobia, redness and visual disturbance.   Respiratory: Negative for cough and chest tightness.    Cardiovascular: Negative for chest pain, palpitations and leg swelling.   Gastrointestinal: Negative for nausea, diarrhea, constipation, blood in stool and abdominal distention.   Genitourinary: Negative.  Has urinary frequency  Musculoskeletal: Has chronic arthritis of both knees with severe pain he uses topical pain gel for that  Skin: Negative.    Neurological: Negative for dizziness, tremors, syncope, weakness, light-headedness and headaches.   Hematological: Does not bruise/bleed easily.   Psychiatric/Behavioral: Negative.        Physical Exam   BP 96/65   Pulse 87   Temp 97.2  F (36.2  C)   Resp 18   Ht 1.803 m (5' 11\")   Wt 78.5 kg (173 lb)   SpO2 98%   BMI 24.13 kg/m       Constitutional: Oriented to person, place, and time and appears well-developed.   HEENT:  Normocephalic and atraumatic.  Eyes: Conjunctivae and EOM are normal. Pupils are equal, round, and reactive to light. No discharge.  No scleral icterus. Nose normal. Mouth/Throat: Oropharynx is clear and moist. No oropharyngeal exudate.    NECK: Normal range of motion. Neck supple. No JVD present. No tracheal deviation present. No thyromegaly present.   CARDIOVASCULAR: Normal rate, regular rhythm and intact distal pulses.  Exam reveals no gallop and no friction rub.  Systolic murmur present.  PULMONARY: Effort normal and breath sounds normal. No respiratory distress.No Wheezing or rales.  ABDOMEN: Soft. Bowel sounds are normal. No distension and no mass.  There is no tenderness. There is no rebound and no guarding. No " HSM.  MUSCULOSKELETAL: Normal range of motion. Mild kyphosis, no tenderness.  Subjective concern of bilateral knee pain  LYMPH NODES: Has no cervical, supraclavicular, axillary and groin adenopathy.   NEUROLOGICAL: Alert and oriented to person, place, and time. No cranial nerve deficit.  Normal muscle tone. Coordination normal.   GENITOURINARY: Deferred exam.  Swelling of shaft of penis;no redness  SKIN: Skin is warm and dry. No rash noted. No erythema. No pallor.   EXTREMITIES: No cyanosis, no clubbing, no edema. No Deformity.  PSYCHIATRIC: Normal mood, affect and behavior.      Lab Results   Last Comprehensive Metabolic Panel:  Lab Results   Component Value Date     01/30/2024    POTASSIUM 4.6 01/30/2024    CHLORIDE 104 01/30/2024    CO2 27 01/30/2024    ANIONGAP 10 01/30/2024    GLC 76 01/30/2024    BUN 19.7 01/30/2024    CR 0.71 01/30/2024    GFRESTIMATED 88 01/30/2024    MARBIN 9.1 01/30/2024            Electronically signed by    Nano Acevedo MD                            Sincerely,        ALEJANDRA Hartley

## 2024-02-29 NOTE — PROGRESS NOTES
[facility] GERIATRIC SERVICES       Patient Liz Shankar  MRN: [id number]        Reason for Visit     Chief Complaint   Patient presents with    Pre-Op Exam       Code Status     CPR/Full code     Assessment     Adult failure to thrive/unsafe living conditions  Dementia  Chronic atrial fibrillation on anticoagulation  Chronic systolic heart failure with last EF of 40 to 45%  Severe aortic stenosis with patient declining TAVR in the past  BPH  Hypotension limiting ability of patient to tolerate medications  Knee pain with underlying history of bilateral surgeries on knees  Generalized weakness  paraphimosis    Plan     Pt is admitted to long-term care  Apparently family felt that they could no longer manage his cares  A-fib was stable on his current regimen of metoprolol but due to persistent hypotension his metoprolol dose was reduced to 25 mg  Hr stable  He continues on his anticoagulation with apixaban  Blood pressures continue to be on the low side and he is not on any other antihypertensive  CHF stable with no evidence of volume overload  BPH symptoms with frequency noted but patient could not tolerate Flomax due to hypotension   he has been discharged with monitoring and follow-up with urology  Has been seen in ER for paraphimosis and needs urology follow-up  On abx   No discharge noted  Has knee pain bilaterally and uses topical pain gels and Tylenol  Apparently not a candidate for knee replacement because of severe aortic stenosis  Continues to have knee pain.  Has had cognitive impairment with repetitive concerns  No other behaviors.  Now scheduled for angiogram and echo per cardiology and medically stable for procedure    History     Patient is a very pleasant 89 year old male who is admitted to LTC  Patient has multiple medical conditions including a history of BPH as well as chronic A-fib and congestive heart failure.  Wts stable  HR stable and has some leg edema but otherwise no concerns  Ambulates  without any device  Course complicated by hypotension and falls.  His medication dosages were adjusted however his family felt they could no longer manage his cares at home and he has been discharged to long-term care with advised to pursue proper placement  He and family looking at snow  Was seen in ER for paraphimosis  Swelling noted but denies any pain    Past Medical & Surgical History     PAST MEDICAL HISTORY:   Past Medical History:   Diagnosis Date    Antiplatelet or antithrombotic long-term use     Arthritis     osteoarthritis    Atrial fibrillation (H)     Bilateral chronic knee pain     CAD (coronary artery disease)     Hypotension, unspecified hypotension type     Irregular heart beat       PAST SURGICAL HISTORY:   has a past surgical history that includes orthopedic surgery.      Past Social History     Reviewed,  reports that he has quit smoking. His smoking use included cigarettes. He has a 10 pack-year smoking history. He has never used smokeless tobacco. He reports that he does not drink alcohol and does not use drugs.    Family History     Reviewed, and family history is not on file.    Medication List     Current Outpatient Medications   Medication    acetaminophen (TYLENOL) 325 MG tablet    amoxicillin (AMOXIL) 500 MG tablet    apixaban ANTICOAGULANT (ELIQUIS) 2.5 MG tablet    calcium carbonate (TUMS) 500 MG chewable tablet    ciprofloxacin (CIPRO) 500 MG tablet    diclofenac (VOLTAREN) 1 % topical gel    docusate sodium (COLACE) 100 MG tablet    metoprolol succinate ER (TOPROL XL) 25 MG 24 hr tablet    nitroGLYcerin (NITROSTAT) 0.4 MG sublingual tablet    polyethylene glycol (MIRALAX) 17 GM/Dose powder     No current facility-administered medications for this visit.      MED REC REQUIRED  Post Medication Reconciliation Status:          Allergies     No Known Allergies    Review of Systems   A comprehensive review of 14 systems was done. Pertinent findings noted here and in history of present  "illness. All the rest negative.  Constitutional: Negative.  Negative for fever, chills, he has  activity change, appetite change and fatigue.   HENT: Negative for congestion and facial swelling.    Eyes: Negative for photophobia, redness and visual disturbance.   Respiratory: Negative for cough and chest tightness.    Cardiovascular: Negative for chest pain, palpitations and leg swelling.   Gastrointestinal: Negative for nausea, diarrhea, constipation, blood in stool and abdominal distention.   Genitourinary: Negative.  Has urinary frequency  Musculoskeletal: Has chronic arthritis of both knees with severe pain he uses topical pain gel for that  Skin: Negative.    Neurological: Negative for dizziness, tremors, syncope, weakness, light-headedness and headaches.   Hematological: Does not bruise/bleed easily.   Psychiatric/Behavioral: Negative.        Physical Exam   BP 96/65   Pulse 87   Temp 97.2  F (36.2  C)   Resp 18   Ht 1.803 m (5' 11\")   Wt 78.5 kg (173 lb)   SpO2 98%   BMI 24.13 kg/m       Constitutional: Oriented to person, place, and time and appears well-developed.   HEENT:  Normocephalic and atraumatic.  Eyes: Conjunctivae and EOM are normal. Pupils are equal, round, and reactive to light. No discharge.  No scleral icterus. Nose normal. Mouth/Throat: Oropharynx is clear and moist. No oropharyngeal exudate.    NECK: Normal range of motion. Neck supple. No JVD present. No tracheal deviation present. No thyromegaly present.   CARDIOVASCULAR: Normal rate, regular rhythm and intact distal pulses.  Exam reveals no gallop and no friction rub.  Systolic murmur present.  PULMONARY: Effort normal and breath sounds normal. No respiratory distress.No Wheezing or rales.  ABDOMEN: Soft. Bowel sounds are normal. No distension and no mass.  There is no tenderness. There is no rebound and no guarding. No HSM.  MUSCULOSKELETAL: Normal range of motion. Mild kyphosis, no tenderness.  Subjective concern of bilateral knee " pain  LYMPH NODES: Has no cervical, supraclavicular, axillary and groin adenopathy.   NEUROLOGICAL: Alert and oriented to person, place, and time. No cranial nerve deficit.  Normal muscle tone. Coordination normal.   GENITOURINARY: Deferred exam.  Swelling of shaft of penis;no redness  SKIN: Skin is warm and dry. No rash noted. No erythema. No pallor.   EXTREMITIES: No cyanosis, no clubbing, no edema. No Deformity.  PSYCHIATRIC: Normal mood, affect and behavior.      Lab Results   Last Comprehensive Metabolic Panel:  Lab Results   Component Value Date     01/30/2024    POTASSIUM 4.6 01/30/2024    CHLORIDE 104 01/30/2024    CO2 27 01/30/2024    ANIONGAP 10 01/30/2024    GLC 76 01/30/2024    BUN 19.7 01/30/2024    CR 0.71 01/30/2024    GFRESTIMATED 88 01/30/2024    MARBIN 9.1 01/30/2024            Electronically signed by    Nano Acevedo MD

## 2024-03-13 ENCOUNTER — DOCUMENTATION ONLY (OUTPATIENT)
Dept: GERIATRICS | Facility: CLINIC | Age: 89
End: 2024-03-13
Payer: COMMERCIAL